# Patient Record
Sex: MALE | Race: WHITE | NOT HISPANIC OR LATINO | Employment: OTHER | URBAN - METROPOLITAN AREA
[De-identification: names, ages, dates, MRNs, and addresses within clinical notes are randomized per-mention and may not be internally consistent; named-entity substitution may affect disease eponyms.]

---

## 2021-01-06 ENCOUNTER — TRANSFERRED RECORDS (OUTPATIENT)
Dept: MULTI SPECIALTY CLINIC | Facility: CLINIC | Age: 71
End: 2021-01-06

## 2021-10-04 ENCOUNTER — HOSPITAL ENCOUNTER (EMERGENCY)
Facility: HOSPITAL | Age: 71
Discharge: HOME | End: 2021-10-04
Payer: COMMERCIAL

## 2021-10-04 VITALS
DIASTOLIC BLOOD PRESSURE: 68 MMHG | WEIGHT: 213 LBS | RESPIRATION RATE: 18 BRPM | HEART RATE: 73 BPM | SYSTOLIC BLOOD PRESSURE: 113 MMHG | OXYGEN SATURATION: 97 % | TEMPERATURE: 98.4 F

## 2021-10-04 NOTE — ED TRIAGE NOTES
Patient presents here for evaluation of readings of pulse oximetry that were 88% from home. He was diagnosed with Covid 19 two days ago. He was vaccinated. But had developed a cough and fevers on 9/30. He denies SOB at rest, but activity does cause shortness of breath.

## 2023-04-24 ENCOUNTER — TELEPHONE (OUTPATIENT)
Dept: INTERNAL MEDICINE | Facility: CLINIC | Age: 73
End: 2023-04-24
Payer: COMMERCIAL

## 2023-04-24 NOTE — TELEPHONE ENCOUNTER
Reason for Call:  Other appointment    Detailed comments: David is hoping to schedule an appt with Dr. Pascual to establish care and also have her look at lumps on his scalp and behind the jaw has has appeared for the last 10 weeks. He had a tumor removed last fall and was diagnosed with very early stage carcinoma melanoma. Dr. Pascual doesn't have any availability until June and was hoping to be seen at a sooner time if possible.    Phone Number Patient can be reached at: Cell number on file:    Telephone Information:   Mobile [phone]       Best Time: Anytime    Can we leave a detailed message on this number? YES    Call taken on 4/24/2023 at 11:00 AM by Emma Burnette

## 2023-04-25 NOTE — TELEPHONE ENCOUNTER
Fine for next available. He should see his dermatologist for skin cancer concerns as well (on chart review BCC was removed, not melanoma).

## 2023-04-28 NOTE — TELEPHONE ENCOUNTER
Patient calling back in regards to VM below. Message from provider relayed. Patient states he was able to secure an appt with his Dermatologist for him immediate concerns on 5/9/23.  He is comfortable with that appt as well as waiting until June to see Dr Pascual.  Scheduled with writer for 6/23/23 which was very satisfactory to patient.  Appt was scheduled and call was ended.

## 2023-06-23 ENCOUNTER — OFFICE VISIT (OUTPATIENT)
Dept: INTERNAL MEDICINE | Facility: CLINIC | Age: 73
End: 2023-06-23
Payer: COMMERCIAL

## 2023-06-23 VITALS
HEART RATE: 73 BPM | HEIGHT: 71 IN | WEIGHT: 223.2 LBS | DIASTOLIC BLOOD PRESSURE: 74 MMHG | SYSTOLIC BLOOD PRESSURE: 132 MMHG | RESPIRATION RATE: 18 BRPM | TEMPERATURE: 97.8 F | OXYGEN SATURATION: 95 % | BODY MASS INDEX: 31.25 KG/M2

## 2023-06-23 DIAGNOSIS — I10 ESSENTIAL HYPERTENSION: ICD-10-CM

## 2023-06-23 DIAGNOSIS — R53.82 CHRONIC FATIGUE: Primary | ICD-10-CM

## 2023-06-23 DIAGNOSIS — R73.01 IMPAIRED FASTING BLOOD SUGAR: ICD-10-CM

## 2023-06-23 PROBLEM — D12.6 ADENOMATOUS POLYP OF COLON: Status: ACTIVE | Noted: 2021-01-13

## 2023-06-23 LAB
BASOPHILS # BLD AUTO: 0 10E3/UL (ref 0–0.2)
BASOPHILS NFR BLD AUTO: 0 %
EOSINOPHIL # BLD AUTO: 0.1 10E3/UL (ref 0–0.7)
EOSINOPHIL NFR BLD AUTO: 2 %
ERYTHROCYTE [DISTWIDTH] IN BLOOD BY AUTOMATED COUNT: 12 % (ref 10–15)
HBA1C MFR BLD: 5.3 % (ref 0–5.6)
HCT VFR BLD AUTO: 41.5 % (ref 40–53)
HGB BLD-MCNC: 14.6 G/DL (ref 13.3–17.7)
IMM GRANULOCYTES # BLD: 0 10E3/UL
IMM GRANULOCYTES NFR BLD: 0 %
LYMPHOCYTES # BLD AUTO: 1.5 10E3/UL (ref 0.8–5.3)
LYMPHOCYTES NFR BLD AUTO: 22 %
MCH RBC QN AUTO: 32.4 PG (ref 26.5–33)
MCHC RBC AUTO-ENTMCNC: 35.2 G/DL (ref 31.5–36.5)
MCV RBC AUTO: 92 FL (ref 78–100)
MONOCYTES # BLD AUTO: 0.6 10E3/UL (ref 0–1.3)
MONOCYTES NFR BLD AUTO: 8 %
NEUTROPHILS # BLD AUTO: 4.5 10E3/UL (ref 1.6–8.3)
NEUTROPHILS NFR BLD AUTO: 68 %
PLATELET # BLD AUTO: 196 10E3/UL (ref 150–450)
RBC # BLD AUTO: 4.5 10E6/UL (ref 4.4–5.9)
WBC # BLD AUTO: 6.7 10E3/UL (ref 4–11)

## 2023-06-23 PROCEDURE — 83036 HEMOGLOBIN GLYCOSYLATED A1C: CPT | Performed by: INTERNAL MEDICINE

## 2023-06-23 PROCEDURE — 80048 BASIC METABOLIC PNL TOTAL CA: CPT | Performed by: INTERNAL MEDICINE

## 2023-06-23 PROCEDURE — 82550 ASSAY OF CK (CPK): CPT | Performed by: INTERNAL MEDICINE

## 2023-06-23 PROCEDURE — 99203 OFFICE O/P NEW LOW 30 MIN: CPT | Performed by: INTERNAL MEDICINE

## 2023-06-23 PROCEDURE — 84443 ASSAY THYROID STIM HORMONE: CPT | Performed by: INTERNAL MEDICINE

## 2023-06-23 PROCEDURE — 36415 COLL VENOUS BLD VENIPUNCTURE: CPT | Performed by: INTERNAL MEDICINE

## 2023-06-23 PROCEDURE — 85025 COMPLETE CBC W/AUTO DIFF WBC: CPT | Performed by: INTERNAL MEDICINE

## 2023-06-23 RX ORDER — KETOCONAZOLE 20 MG/G
2 CREAM TOPICAL
COMMUNITY
Start: 2023-03-27 | End: 2024-01-25

## 2023-06-23 RX ORDER — KETOCONAZOLE 20 MG/G
CREAM TOPICAL
COMMUNITY
Start: 2022-08-31

## 2023-06-23 RX ORDER — ATENOLOL 25 MG/1
25 TABLET ORAL
COMMUNITY
Start: 2023-03-02 | End: 2023-10-23

## 2023-06-23 RX ORDER — CHLORAL HYDRATE 500 MG
2000 CAPSULE ORAL
COMMUNITY

## 2023-06-23 RX ORDER — KETOCONAZOLE 20 MG/ML
2 SHAMPOO TOPICAL
COMMUNITY
Start: 2022-08-31

## 2023-06-23 RX ORDER — ISOSORBIDE MONONITRATE 60 MG/1
60 TABLET, EXTENDED RELEASE ORAL
COMMUNITY
Start: 2022-12-29 | End: 2023-10-23

## 2023-06-23 RX ORDER — PIMECROLIMUS 10 MG/G
1 CREAM TOPICAL
COMMUNITY
Start: 2023-05-05

## 2023-06-23 RX ORDER — ISOSORBIDE MONONITRATE 30 MG/1
30 TABLET, EXTENDED RELEASE ORAL
COMMUNITY
Start: 2023-03-02 | End: 2023-10-23

## 2023-06-23 RX ORDER — LISINOPRIL AND HYDROCHLOROTHIAZIDE 20; 25 MG/1; MG/1
1 TABLET ORAL DAILY
COMMUNITY
Start: 2022-12-29 | End: 2023-10-23

## 2023-06-23 RX ORDER — ISOSORBIDE MONONITRATE 30 MG/1
30 TABLET, EXTENDED RELEASE ORAL
COMMUNITY
Start: 2022-12-29 | End: 2023-06-23

## 2023-06-23 ASSESSMENT — PAIN SCALES - GENERAL: PAINLEVEL: MODERATE PAIN (4)

## 2023-06-23 NOTE — PROGRESS NOTES
Assessment & Plan   Problem List Items Addressed This Visit        Endocrine    Impaired fasting blood sugar     Blood sugar fasting was 103 at his visit with previous PCP 12/2022.  Has noticed that some afternoon fatigue improves after he eats a snack.  Wondering about diabetes.  -Check A1c today         Relevant Orders    Hemoglobin A1c (Completed)       Circulatory    Essential hypertension     Well-controlled on current regimen.  - Continue Lisinopril/HCTZ 20/25 daily, atenolol 25, Imdur 90 mg daily         Relevant Medications    lisinopril-hydrochlorothiazide (ZESTORETIC) 20-25 MG tablet       Other    Chronic fatigue - Primary     Patient reports issues with overall fatigue late morning and late afternoon.  Additionally, has felt fatigue/weakness specifically in his legs over the last 7 to 8 months.  He is still able to walk 2 to 3 miles a day but notices that it is harder to get up after sitting down for a prolonged period of time and stairs are a little bit harder.  Interestingly, he had been working with a  recently but they had not been working on strengthening his legs are on balance as it was hard for him.  -We will check basic labs for fatigue  -Recommended that he work on leg strengthening exercises to help with this weakness he feels in his legs  -He does have low back pain and weak core may also be contributing.  -Offered PT, patient declined saying he like to work on strengthening and stretching on his own first  -Follow-up in 3 months           Relevant Orders    TSH with free T4 reflex    CBC with platelets and differential (Completed)    CK total    Basic metabolic panel        Review of prior external note(s) from - [facility]where information from [facility]  reviewed  Ordering of each unique test  I spent a total of 38 minutes on the day of the visit.   Time spent by me doing chart review, history and exam, documentation and further activities per the note     BMI:   Estimated  "body mass index is 31.13 kg/m  as calculated from the following:    Height as of this encounter: 1.803 m (5' 11\").    Weight as of this encounter: 101.2 kg (223 lb 3.2 oz).   Weight management plan: Discussed healthy diet and exercise guidelines    FUTURE APPOINTMENTS:       - Follow-up in 3 months     Yusra Pascual MD  [facility] JOHNNY Hernandez is a 73 year old, presenting for the following health issues:  Establish Care (Patient stated that he wants to establish care with Dr. Pascual. His PCP isn't network with his new insurance. )        6/23/2023     2:50 PM   Additional Questions   Roomed by Peace SHAFFER MA   Accompanied by ZANDRA     History of Present Illness       Reason for visit:  1 Dermatitis changes on scalp & face  2. Poor stability when fatigued (daily)  3. Deep fatigue late afternoons.  Adressed with eating. Wonder if this might be early onset diabetes.  Symptom onset:  More than a month  Symptoms include:  See above  Symptom intensity:  Moderate  Symptom progression:  Worsening  Had these symptoms before:  No  What makes it worse:  Hard to answer this question constructively.  Tge various symptoms have been on going.  What makes it better:  Usually improve over night    He eats 2-3 servings of fruits and vegetables daily.He consumes 0 sweetened beverage(s) daily.He exercises with enough effort to increase his heart rate 30 to 60 minutes per day.  He exercises with enough effort to increase his heart rate 7 days per week.   He is taking medications regularly.       Fatigue/weakness in legs: Has noticed this over the last 7 to 8 months and has been progressive.  Says that it is very hard to get up after sitting down for any prolonged period of town.  Feels the most fatigue inguinal and quad muscles. Stairs are harder.  He is able to walk 2 to 3 miles a day.  Had been working with a , but says they stayed away from exercises to strengthen or use " "his legs as he was having trouble with them.  Did PT in the remote past and did find it helpful, has not been working on those exercises or stretches recently.    Fatigue: More fatigued late morning and late afternoon. Improves with eating (crackers, pretzels). Father was diabetic.     H/o non-melanoma skin cancers: Follows with Dr. Aden at .  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  He was seen for follow-up on 5/5 for new scalp and right neck lesion.  These were biopsied on 5/5.  Scalp lesion showed SCC in situ, neck lesion was hypertrophic actinic keratoses. Will get SCC removed in July.     Seborrheic dermatitis: Pimecrolimus ointment per dermatology    HTN: Lisinopril/HCTZ 20/25 daily, atenolol 25, Imdur 90 mg daily. Home BPs 130s/70s.    Lipids 12/2022 Tchol 184, HDL 48, , TG 86    Colonoscopy 1/6/21     Review of Systems   Constitutional, HEENT, cardiovascular, pulmonary, gi and gu systems are negative, except as otherwise noted.      Objective    /74 (BP Location: Right arm, Patient Position: Sitting, Cuff Size: Adult Regular)   Pulse 73   Temp 97.8  F (36.6  C) (Oral)   Resp 18   Ht 1.803 m (5' 11\")   Wt 101.2 kg (223 lb 3.2 oz)   SpO2 95%   BMI 31.13 kg/m    Body mass index is 31.13 kg/m .  Physical Exam   GENERAL: healthy, alert and no distress  EYES: Eyes grossly normal to inspection, PERRL and conjunctivae and sclerae normal  HENT: ear canals and TM's normal - flaking skin and cerumen noted, nose and mouth without ulcers or lesions  RESP: lungs clear to auscultation - no rales, rhonchi or wheezes  CV: regular rate and rhythm, normal S1 S2, no S3 or S4, no murmur, click or rub, no peripheral edema and peripheral pulses strong  ABDOMEN: soft, nontender, no hepatosplenomegaly, no masses and bowel sounds normal  MS: no gross musculoskeletal defects noted, no edema  NEURO: Normal strength and tone, mentation intact and speech " normal  PSYCH: mentation appears normal, affect normal/bright    Results for orders placed or performed in visit on 06/23/23 (from the past 24 hour(s))   Hemoglobin A1c   Result Value Ref Range    Hemoglobin A1C 5.3 0.0 - 5.6 %   CBC with platelets and differential    Narrative    The following orders were created for panel order CBC with platelets and differential.  Procedure                               Abnormality         Status                     ---------                               -----------         ------                     CBC with platelets and d...[[id number]]                      Final result                 Please view results for these tests on the individual orders.   CBC with platelets and differential   Result Value Ref Range    WBC Count 6.7 4.0 - 11.0 10e3/uL    RBC Count 4.50 4.40 - 5.90 10e6/uL    Hemoglobin 14.6 13.3 - 17.7 g/dL    Hematocrit 41.5 40.0 - 53.0 %    MCV 92 78 - 100 fL    MCH 32.4 26.5 - 33.0 pg    MCHC 35.2 31.5 - 36.5 g/dL    RDW 12.0 10.0 - 15.0 %    Platelet Count 196 150 - 450 10e3/uL    % Neutrophils 68 %    % Lymphocytes 22 %    % Monocytes 8 %    % Eosinophils 2 %    % Basophils 0 %    % Immature Granulocytes 0 %    Absolute Neutrophils 4.5 1.6 - 8.3 10e3/uL    Absolute Lymphocytes 1.5 0.8 - 5.3 10e3/uL    Absolute Monocytes 0.6 0.0 - 1.3 10e3/uL    Absolute Eosinophils 0.1 0.0 - 0.7 10e3/uL    Absolute Basophils 0.0 0.0 - 0.2 10e3/uL    Absolute Immature Granulocytes 0.0 <=0.4 10e3/uL

## 2023-06-23 NOTE — LETTER
June 27, 2023      Daivd ESCOBAR Rigo  [address]  [place] [facility] [address]        Dear ,    We are writing to inform you of your test results.    Kidney function, muscle enzymes, and electrolytes are normal. Blood sugar is slightly elevated but A1c (true marker for diabetes) was normal, so no concern for diabetes at this time. Thyroid function and blood counts were also normal.     Resulted Orders   Hemoglobin A1c   Result Value Ref Range    Hemoglobin A1C 5.3 0.0 - 5.6 %      Comment:      Normal <5.7%   Prediabetes 5.7-6.4%    Diabetes 6.5% or higher     Note: Adopted from ADA consensus guidelines.   TSH with free T4 reflex   Result Value Ref Range    TSH 1.49 0.30 - 4.20 uIU/mL   CK total   Result Value Ref Range     39 - 308 U/L   Basic metabolic panel   Result Value Ref Range    Sodium 139 136 - 145 mmol/L    Potassium 4.1 3.4 - 5.3 mmol/L    Chloride 100 98 - 107 mmol/L    Carbon Dioxide (CO2) 25 22 - 29 mmol/L    Anion Gap 14 7 - 15 mmol/L    Urea Nitrogen 23.6 (H) 8.0 - 23.0 mg/dL    Creatinine 0.96 0.67 - 1.17 mg/dL    Calcium 9.3 8.8 - 10.2 mg/dL    Glucose 107 (H) 70 - 99 mg/dL    GFR Estimate 85 >60 mL/min/1.73m2   CBC with platelets and differential   Result Value Ref Range    WBC Count 6.7 4.0 - 11.0 10e3/uL    RBC Count 4.50 4.40 - 5.90 10e6/uL    Hemoglobin 14.6 13.3 - 17.7 g/dL    Hematocrit 41.5 40.0 - 53.0 %    MCV 92 78 - 100 fL    MCH 32.4 26.5 - 33.0 pg    MCHC 35.2 31.5 - 36.5 g/dL    RDW 12.0 10.0 - 15.0 %    Platelet Count 196 150 - 450 10e3/uL    % Neutrophils 68 %    % Lymphocytes 22 %    % Monocytes 8 %    % Eosinophils 2 %    % Basophils 0 %    % Immature Granulocytes 0 %    Absolute Neutrophils 4.5 1.6 - 8.3 10e3/uL    Absolute Lymphocytes 1.5 0.8 - 5.3 10e3/uL    Absolute Monocytes 0.6 0.0 - 1.3 10e3/uL    Absolute Eosinophils 0.1 0.0 - 0.7 10e3/uL    Absolute Basophils 0.0 0.0 - 0.2 10e3/uL    Absolute Immature Granulocytes 0.0 <=0.4 10e3/uL       If you have any  questions or concerns, please call the clinic at the number listed above.       Sincerely,      Yusra Pascual MD

## 2023-06-23 NOTE — ASSESSMENT & PLAN NOTE
Patient reports issues with overall fatigue late morning and late afternoon.  Additionally, has felt fatigue/weakness specifically in his legs over the last 7 to 8 months.  He is still able to walk 2 to 3 miles a day but notices that it is harder to get up after sitting down for a prolonged period of time and stairs are a little bit harder.  Interestingly, he had been working with a  recently but they had not been working on strengthening his legs are on balance as it was hard for him.  -We will check basic labs for fatigue  -Recommended that he work on leg strengthening exercises to help with this weakness he feels in his legs  -He does have low back pain and weak core may also be contributing.  -Offered PT, patient declined saying he like to work on strengthening and stretching on his own first  -Follow-up in 3 months

## 2023-06-23 NOTE — ASSESSMENT & PLAN NOTE
Well-controlled on current regimen.  - Continue Lisinopril/HCTZ 20/25 daily, atenolol 25, Imdur 90 mg daily

## 2023-06-23 NOTE — ASSESSMENT & PLAN NOTE
Blood sugar fasting was 103 at his visit with previous PCP 12/2022.  Has noticed that some afternoon fatigue improves after he eats a snack.  Wondering about diabetes.  -Check A1c today

## 2023-06-24 LAB
ANION GAP SERPL CALCULATED.3IONS-SCNC: 14 MMOL/L (ref 7–15)
BUN SERPL-MCNC: 23.6 MG/DL (ref 8–23)
CALCIUM SERPL-MCNC: 9.3 MG/DL (ref 8.8–10.2)
CHLORIDE SERPL-SCNC: 100 MMOL/L (ref 98–107)
CK SERPL-CCNC: 100 U/L (ref 39–308)
CREAT SERPL-MCNC: 0.96 MG/DL (ref 0.67–1.17)
DEPRECATED HCO3 PLAS-SCNC: 25 MMOL/L (ref 22–29)
GFR SERPL CREATININE-BSD FRML MDRD: 85 ML/MIN/1.73M2
GLUCOSE SERPL-MCNC: 107 MG/DL (ref 70–99)
POTASSIUM SERPL-SCNC: 4.1 MMOL/L (ref 3.4–5.3)
SODIUM SERPL-SCNC: 139 MMOL/L (ref 136–145)
TSH SERPL DL<=0.005 MIU/L-ACNC: 1.49 UIU/ML (ref 0.3–4.2)

## 2023-06-27 ENCOUNTER — TELEPHONE (OUTPATIENT)
Dept: INTERNAL MEDICINE | Facility: CLINIC | Age: 73
End: 2023-06-27
Payer: COMMERCIAL

## 2023-06-27 NOTE — TELEPHONE ENCOUNTER
----- Message from Yusra Pascual MD sent at 6/26/2023  8:28 PM CDT -----  Please call patient with results:     Kidney function, muscle enzymes, and electrolytes are normal. Blood sugar is slightly elevated but A1c (true marker for diabetes) was normal, so no concern for diabetes at this time. Thyroid function and blood counts were also normal.

## 2023-10-23 ENCOUNTER — OFFICE VISIT (OUTPATIENT)
Dept: INTERNAL MEDICINE | Facility: CLINIC | Age: 73
End: 2023-10-23
Payer: COMMERCIAL

## 2023-10-23 VITALS
HEIGHT: 70 IN | TEMPERATURE: 97.7 F | HEART RATE: 72 BPM | WEIGHT: 230.9 LBS | SYSTOLIC BLOOD PRESSURE: 138 MMHG | DIASTOLIC BLOOD PRESSURE: 80 MMHG | RESPIRATION RATE: 17 BRPM | BODY MASS INDEX: 33.05 KG/M2 | OXYGEN SATURATION: 95 %

## 2023-10-23 DIAGNOSIS — Z12.5 SCREENING FOR PROSTATE CANCER: ICD-10-CM

## 2023-10-23 DIAGNOSIS — H91.93 DECREASED HEARING OF BOTH EARS: ICD-10-CM

## 2023-10-23 DIAGNOSIS — L21.9 SEBORRHEIC DERMATITIS: ICD-10-CM

## 2023-10-23 DIAGNOSIS — I10 ESSENTIAL HYPERTENSION: ICD-10-CM

## 2023-10-23 DIAGNOSIS — E66.811 CLASS 1 OBESITY DUE TO EXCESS CALORIES WITH SERIOUS COMORBIDITY AND BODY MASS INDEX (BMI) OF 33.0 TO 33.9 IN ADULT: ICD-10-CM

## 2023-10-23 DIAGNOSIS — Z00.00 ENCOUNTER FOR MEDICARE ANNUAL WELLNESS EXAM: Primary | ICD-10-CM

## 2023-10-23 DIAGNOSIS — R39.9 LOWER URINARY TRACT SYMPTOMS (LUTS): ICD-10-CM

## 2023-10-23 DIAGNOSIS — E66.09 CLASS 1 OBESITY DUE TO EXCESS CALORIES WITH SERIOUS COMORBIDITY AND BODY MASS INDEX (BMI) OF 33.0 TO 33.9 IN ADULT: ICD-10-CM

## 2023-10-23 DIAGNOSIS — Z23 ENCOUNTER FOR VACCINATION: ICD-10-CM

## 2023-10-23 LAB
ALBUMIN SERPL BCG-MCNC: 4.4 G/DL (ref 3.5–5.2)
ALP SERPL-CCNC: 69 U/L (ref 40–129)
ALT SERPL W P-5'-P-CCNC: 10 U/L (ref 0–70)
ANION GAP SERPL CALCULATED.3IONS-SCNC: 9 MMOL/L (ref 7–15)
AST SERPL W P-5'-P-CCNC: 22 U/L (ref 0–45)
BILIRUB SERPL-MCNC: 0.5 MG/DL
BUN SERPL-MCNC: 17.7 MG/DL (ref 8–23)
CALCIUM SERPL-MCNC: 9.1 MG/DL (ref 8.8–10.2)
CHLORIDE SERPL-SCNC: 100 MMOL/L (ref 98–107)
CHOLEST SERPL-MCNC: 176 MG/DL
CREAT SERPL-MCNC: 0.83 MG/DL (ref 0.67–1.17)
DEPRECATED HCO3 PLAS-SCNC: 27 MMOL/L (ref 22–29)
EGFRCR SERPLBLD CKD-EPI 2021: >90 ML/MIN/1.73M2
ERYTHROCYTE [DISTWIDTH] IN BLOOD BY AUTOMATED COUNT: 11.7 % (ref 10–15)
GLUCOSE SERPL-MCNC: 87 MG/DL (ref 70–99)
HCT VFR BLD AUTO: 40.9 % (ref 40–53)
HDLC SERPL-MCNC: 50 MG/DL
HGB BLD-MCNC: 14.5 G/DL (ref 13.3–17.7)
LDLC SERPL CALC-MCNC: 108 MG/DL
MCH RBC QN AUTO: 32.7 PG (ref 26.5–33)
MCHC RBC AUTO-ENTMCNC: 35.5 G/DL (ref 31.5–36.5)
MCV RBC AUTO: 92 FL (ref 78–100)
NONHDLC SERPL-MCNC: 126 MG/DL
PLATELET # BLD AUTO: 186 10E3/UL (ref 150–450)
POTASSIUM SERPL-SCNC: 3.9 MMOL/L (ref 3.4–5.3)
PROT SERPL-MCNC: 6.6 G/DL (ref 6.4–8.3)
PSA SERPL DL<=0.01 NG/ML-MCNC: 0.42 NG/ML (ref 0–6.5)
RBC # BLD AUTO: 4.43 10E6/UL (ref 4.4–5.9)
SODIUM SERPL-SCNC: 136 MMOL/L (ref 135–145)
TRIGL SERPL-MCNC: 90 MG/DL
WBC # BLD AUTO: 5.8 10E3/UL (ref 4–11)

## 2023-10-23 PROCEDURE — 91320 SARSCV2 VAC 30MCG TRS-SUC IM: CPT | Performed by: INTERNAL MEDICINE

## 2023-10-23 PROCEDURE — G0438 PPPS, INITIAL VISIT: HCPCS | Performed by: INTERNAL MEDICINE

## 2023-10-23 PROCEDURE — G0008 ADMIN INFLUENZA VIRUS VAC: HCPCS | Performed by: INTERNAL MEDICINE

## 2023-10-23 PROCEDURE — 80053 COMPREHEN METABOLIC PANEL: CPT | Performed by: INTERNAL MEDICINE

## 2023-10-23 PROCEDURE — 90662 IIV NO PRSV INCREASED AG IM: CPT | Performed by: INTERNAL MEDICINE

## 2023-10-23 PROCEDURE — 99214 OFFICE O/P EST MOD 30 MIN: CPT | Mod: 25 | Performed by: INTERNAL MEDICINE

## 2023-10-23 PROCEDURE — 36415 COLL VENOUS BLD VENIPUNCTURE: CPT | Performed by: INTERNAL MEDICINE

## 2023-10-23 PROCEDURE — 85027 COMPLETE CBC AUTOMATED: CPT | Performed by: INTERNAL MEDICINE

## 2023-10-23 PROCEDURE — 80061 LIPID PANEL: CPT | Performed by: INTERNAL MEDICINE

## 2023-10-23 PROCEDURE — G0103 PSA SCREENING: HCPCS | Performed by: INTERNAL MEDICINE

## 2023-10-23 PROCEDURE — 90480 ADMN SARSCOV2 VAC 1/ONLY CMP: CPT | Performed by: INTERNAL MEDICINE

## 2023-10-23 RX ORDER — KETOCONAZOLE 20 MG/G
CREAM TOPICAL
Status: CANCELLED | OUTPATIENT
Start: 2023-10-23

## 2023-10-23 RX ORDER — ISOSORBIDE MONONITRATE 60 MG/1
60 TABLET, EXTENDED RELEASE ORAL DAILY
Qty: 90 TABLET | Refills: 3 | Status: SHIPPED | OUTPATIENT
Start: 2023-10-23

## 2023-10-23 RX ORDER — ATENOLOL 25 MG/1
25 TABLET ORAL DAILY
Qty: 90 TABLET | Refills: 3 | Status: SHIPPED | OUTPATIENT
Start: 2023-10-23 | End: 2024-01-30

## 2023-10-23 RX ORDER — LISINOPRIL AND HYDROCHLOROTHIAZIDE 20; 25 MG/1; MG/1
1 TABLET ORAL DAILY
Qty: 90 TABLET | Refills: 3 | Status: SHIPPED | OUTPATIENT
Start: 2023-10-23 | End: 2023-11-20

## 2023-10-23 RX ORDER — TAMSULOSIN HYDROCHLORIDE 0.4 MG/1
0.4 CAPSULE ORAL DAILY
Qty: 90 CAPSULE | Refills: 0 | Status: SHIPPED | OUTPATIENT
Start: 2023-10-23 | End: 2024-01-30

## 2023-10-23 RX ORDER — ISOSORBIDE MONONITRATE 30 MG/1
30 TABLET, EXTENDED RELEASE ORAL DAILY
Qty: 90 TABLET | Refills: 3 | Status: SHIPPED | OUTPATIENT
Start: 2023-10-23

## 2023-10-23 RX ORDER — PIMECROLIMUS 10 MG/G
1 CREAM TOPICAL
Status: CANCELLED | OUTPATIENT
Start: 2023-10-23

## 2023-10-23 ASSESSMENT — PAIN SCALES - GENERAL: PAINLEVEL: NO PAIN (0)

## 2023-10-23 ASSESSMENT — ENCOUNTER SYMPTOMS
HEMATURIA: 0
DYSURIA: 0
NERVOUS/ANXIOUS: 0
FEVER: 0
DIARRHEA: 0
CHILLS: 0
NAUSEA: 0
FREQUENCY: 1
DIZZINESS: 0
PARESTHESIAS: 0
COUGH: 0
HEARTBURN: 0
HEADACHES: 0
EYE PAIN: 0
WEAKNESS: 0
PALPITATIONS: 0
SHORTNESS OF BREATH: 0
SORE THROAT: 0
ABDOMINAL PAIN: 0
JOINT SWELLING: 0
MYALGIAS: 1
HEMATOCHEZIA: 0
CONSTIPATION: 0

## 2023-10-23 ASSESSMENT — ACTIVITIES OF DAILY LIVING (ADL): CURRENT_FUNCTION: NO ASSISTANCE NEEDED

## 2023-10-23 NOTE — PROGRESS NOTES
"SUBJECTIVE:   David is a 73 year old who presents for Preventive Visit.      10/23/2023     2:20 PM   Additional Questions   Roomed by Porter NAM CMA     Are you in the first 12 months of your Medicare coverage?  No    Healthy Habits:     In general, how would you rate your overall health?  Excellent    Frequency of exercise:  6-7 days/week    Duration of exercise:  45-60 minutes    Do you usually eat at least 4 servings of fruit and vegetables a day, include whole grains    & fiber and avoid regularly eating high fat or \"junk\" foods?  Yes    Taking medications regularly:  Yes    Medication side effects:  None    Ability to successfully perform activities of daily living:  No assistance needed    Home Safety:  Lack of grab bars in the bathroom    Hearing Impairment:  No hearing concerns    In the past 6 months, have you been bothered by leaking of urine? Yes    In general, how would you rate your overall mental or emotional health?  Excellent    Additional concerns today:  No    Today's PHQ-2 Score:       10/23/2023     2:12 PM   PHQ-2 ( 1999 [employer])   Q1: Little interest or pleasure in doing things 0   Q2: Feeling down, depressed or hopeless 0   PHQ-2 Score 0   Q1: Little interest or pleasure in doing things Not at all   Q2: Feeling down, depressed or hopeless Not at all   PHQ-2 Score 0     HTN: Lisinopril/HCTZ 20/25 daily, atenolol 25, Imdur 90 mg daily. BP today 138/80.     Lipids 12/2022 Tchol 184, HDL 48, , TG 86     Seborrheic Dermatitis: Following with dermDr. Aden at , last visit 9/8/23. Pimecrolimus 1% cream and ketonconazole cream.     Obesity: Stopped weight management program spring 2023 and 15 lbs since then, 25 lbs total over the year. Wanting help with more accountability because he notes this is what has changed and he has a hard time staying consistent with diet without that. Still very active and walking 3.5-4 miles a day.     Urinary incontinence: earlier this summer, getting up 2-3 " times at night. Does feel like he empties bladder fully but will notice some leakage/wetness in underwear if he doesn't use bathroom in time. Stream is normal. No dribbling.     Have you ever done Advance Care Planning? (For example, a Health Directive, POLST, or a discussion with a medical provider or your loved ones about your wishes): Yes, patient states has an Advance Care Planning document and will bring a copy to the clinic.     Fall risk  Fallen 2 or more times in the past year?: No  Any fall with injury in the past year?: No    Cognitive Screening   1) Repeat 3 items (Leader, Season, Table)    2) Clock draw: NORMAL  3) 3 item recall: Recalls 3 objects  Results: 3 items recalled: COGNITIVE IMPAIRMENT LESS LIKELY    Mini-CogTM Copyright S Keven. Licensed by the author for use in [facility]; reprinted with permission (ulices@[facility]). All rights reserved.        Reviewed and updated as needed this visit by clinical staff   Tobacco  Allergies  Meds  Problems  Med Hx  Surg Hx  Fam Hx          Reviewed and updated as needed this visit by Provider   Tobacco  Allergies  Meds  Problems  Med Hx  Surg Hx  Fam Hx         Social History     Tobacco Use     Smoking status: Never     Smokeless tobacco: Never   Substance Use Topics     Alcohol use: Not on file         10/23/2023     2:12 PM   Alcohol Use   Prescreen: >3 drinks/day or >7 drinks/week? No     Do you have a current opioid prescription? No  Do you use any other controlled substances or medications that are not prescribed by a provider? None    Current providers sharing in care for this patient include:   Patient Care Team:  Yusra Pascual MD as PCP - General (Internal Medicine)  Yusra Pascual MD as Assigned PCP    The following health maintenance items are reviewed in Epic and correct as of today:  Health Maintenance   Topic Date Due     ANNUAL REVIEW OF HM ORDERS  Never done     LIPID  Never done     RSV  "VACCINE 60+ (1 - 1-dose 60+ series) Never done     MEDICARE ANNUAL WELLNESS VISIT  03/01/2017     FALL RISK ASSESSMENT  10/23/2024     COLORECTAL CANCER SCREENING  01/06/2026     ADVANCE CARE PLANNING  10/23/2028     DTAP/TDAP/TD IMMUNIZATION (5 - Td or Tdap) 06/17/2031     PHQ-2 (once per calendar year)  Completed     INFLUENZA VACCINE  Completed     Pneumococcal Vaccine: 65+ Years  Completed     ZOSTER IMMUNIZATION  Completed     COVID-19 Vaccine  Completed     HEPATITIS C SCREENING  Addressed     IPV IMMUNIZATION  Aged Out     HPV IMMUNIZATION  Aged Out     MENINGITIS IMMUNIZATION  Aged Out     AORTIC ANEURYSM SCREENING (SYSTEM ASSIGNED)  Discontinued       Review of Systems   Constitutional:  Negative for chills and fever.   HENT:  Negative for congestion, ear pain, hearing loss and sore throat.    Eyes:  Negative for pain and visual disturbance.   Respiratory:  Negative for cough and shortness of breath.    Cardiovascular:  Negative for chest pain, palpitations and peripheral edema.   Gastrointestinal:  Negative for abdominal pain, constipation, diarrhea, heartburn, hematochezia and nausea.   Genitourinary:  Positive for frequency. Negative for dysuria, genital sores, hematuria, impotence, penile discharge and urgency.   Musculoskeletal:  Positive for myalgias. Negative for joint swelling.   Skin:  Negative for rash.   Neurological:  Negative for dizziness, weakness, headaches and paresthesias.   Psychiatric/Behavioral:  Negative for mood changes. The patient is not nervous/anxious.      OBJECTIVE:   /80 (BP Location: Right arm, Patient Position: Sitting, Cuff Size: Adult Regular)   Pulse 72   Temp 97.7  F (36.5  C) (Oral)   Resp 17   Ht 1.775 m (5' 9.9\")   Wt 104.7 kg (230 lb 14.4 oz)   SpO2 95%   BMI 33.23 kg/m   Estimated body mass index is 33.23 kg/m  as calculated from the following:    Height as of this encounter: 1.775 m (5' 9.9\").    Weight as of this encounter: 104.7 kg (230 lb 14.4 " oz).  Physical Exam  GENERAL: healthy, alert and no distress  EYES: Eyes grossly normal to inspection, PERRL and conjunctivae and sclerae normal  HENT: ear canals and TM's normal, nose and mouth without ulcers or lesions  NECK: no adenopathy, no asymmetry, masses, or scars and thyroid normal to palpation  RESP: lungs clear to auscultation - no rales, rhonchi or wheezes  CV: regular rate and rhythm, normal S1 S2, no S3 or S4, no murmur, click or rub, no peripheral edema and peripheral pulses strong  ABDOMEN: soft, nontender, no hepatosplenomegaly, no masses and bowel sounds normal  MS: no gross musculoskeletal defects noted, no edema  SKIN: no suspicious lesions or rashes  NEURO: Normal strength and tone, mentation intact and speech normal  PSYCH: mentation appears normal, affect normal/bright    Patient declined rectal exam today.     Diagnostic Test Results:  Labs reviewed in Epic  Results for orders placed or performed in visit on 10/23/23 (from the past 24 hour(s))   CBC with platelets   Result Value Ref Range    WBC Count 5.8 4.0 - 11.0 10e3/uL    RBC Count 4.43 4.40 - 5.90 10e6/uL    Hemoglobin 14.5 13.3 - 17.7 g/dL    Hematocrit 40.9 40.0 - 53.0 %    MCV 92 78 - 100 fL    MCH 32.7 26.5 - 33.0 pg    MCHC 35.5 31.5 - 36.5 g/dL    RDW 11.7 10.0 - 15.0 %    Platelet Count 186 150 - 450 10e3/uL       ASSESSMENT / PLAN:     Problem List Items Addressed This Visit          Nervous and Auditory    Decreased hearing of both ears     Has had progressive slight hearing loss in both ears.   - audiology referral          Relevant Orders    Adult Audiology  Referral       Digestive    Class 1 obesity due to excess calories with serious comorbidity and body mass index (BMI) of 33.0 to 33.9 in adult     Has gained 15 lbs since stopping his weight management program through [facility]. Interested in another similar program because he has trouble with consistency in diet without accountability.   - 24 week healthy lifestyle  program referral placed         Relevant Orders    Adult Comprehensive Weight Management  Referral       Circulatory    Essential hypertension     Well-controlled on current regimen.  - Continue Lisinopril/HCTZ 20/25 daily, atenolol 25, Imdur 90 mg daily         Relevant Medications    lisinopril-hydrochlorothiazide (ZESTORETIC) 20-25 MG tablet    atenolol (TENORMIN) 25 MG tablet    isosorbide mononitrate (IMDUR) 30 MG 24 hr tablet    isosorbide mononitrate (IMDUR) 60 MG 24 hr tablet       Musculoskeletal and Integumentary    Seborrheic dermatitis     Follows with Dr. Aden, dermatology at .   - Well controlled with Pimecrolimus 1% cream and ketonconazole cream.             Urinary    Lower urinary tract symptoms (LUTS)     Patient has had few months of increased nocturia, frequency during the day and some of what sounds like overflow or urgency incontinence. He declines rectal exam today.   - Trial flomax 0.4 mg nightly  - F/up 3 months          Relevant Medications    tamsulosin (FLOMAX) 0.4 MG capsule       Other    Encounter for Medicare annual wellness exam - Primary     We discussed healthy lifestyle, nutrition, cardiovascular risk reduction, self care, safety, sunscreen, and timing of cancer screening.  Health maintenance screening and immunizations reviewed with the patient.    - Update labs today  - COVID and flu shots   - Colonoscopy due 2026           Relevant Orders    Lipid panel reflex to direct LDL Non-fasting    Comprehensive metabolic panel    CBC with platelets (Completed)     Other Visit Diagnoses       Encounter for vaccination        Relevant Orders    INFLUENZA VACCINE 65+ (FLUZONE HD) (Completed)    COVID-19 12+ (2023-24) (PFIZER) (Completed)    Screening for prostate cancer        Relevant Orders    PSA, screen            Patient has been advised of split billing requirements and indicates understanding: Yes      COUNSELING:  Reviewed preventive health counseling, as reflected in  patient instructions  Special attention given to:       Regular exercise       Healthy diet/nutrition       Immunizations  Vaccinated for: Covid-19 and Influenza          He reports that he has never smoked. He has never used smokeless tobacco.      Appropriate preventive services were discussed with this patient, including applicable screening as appropriate for fall prevention, nutrition, physical activity, Tobacco-use cessation, weight loss and cognition.  Checklist reviewing preventive services available has been given to the patient.    Reviewed patients plan of care and provided an AVS. The Basic Care Plan (routine screening as documented in Health Maintenance) for David meets the Care Plan requirement. This Care Plan has been established and reviewed with the Patient.          Yusra Pascual MD  [facility]    Identified Health Risks:  I have reviewed Opioid Use Disorder and Substance Use Disorder risk factors and made any needed referrals. Information on urinary incontinence and treatment options given to patient.

## 2023-10-23 NOTE — LETTER
October 25, 2023      David Sierra  [address]  [place] [facility] [address]        Dear ,    We are writing to inform you of your test results.    Your cholesterol levels are stable from previous, which is good. Your PSA, screen for prostate cancer was negative. Your blood counts, blood sugar, kidney function, liver function and electrolytes were also all normal.     Resulted Orders   Lipid panel reflex to direct LDL Non-fasting   Result Value Ref Range    Cholesterol 176 <200 mg/dL    Triglycerides 90 <150 mg/dL    Direct Measure HDL 50 >=40 mg/dL    LDL Cholesterol Calculated 108 (H) <=100 mg/dL    Non HDL Cholesterol 126 <130 mg/dL    Narrative    Cholesterol  Desirable:  <200 mg/dL    Triglycerides  Normal:  Less than 150 mg/dL  Borderline High:  150-199 mg/dL  High:  200-499 mg/dL  Very High:  Greater than or equal to 500 mg/dL    Direct Measure HDL  Female:  Greater than or equal to 50 mg/dL   Male:  Greater than or equal to 40 mg/dL    LDL Cholesterol  Desirable:  <100mg/dL  Above Desirable:  100-129 mg/dL   Borderline High:  130-159 mg/dL   High:  160-189 mg/dL   Very High:  >= 190 mg/dL    Non HDL Cholesterol  Desirable:  130 mg/dL  Above Desirable:  130-159 mg/dL  Borderline High:  160-189 mg/dL  High:  190-219 mg/dL  Very High:  Greater than or equal to 220 mg/dL   Comprehensive metabolic panel   Result Value Ref Range    Sodium 136 135 - 145 mmol/L      Comment:      Reference intervals for this test were updated on 09/26/2023 to more accurately reflect our healthy population. There may be differences in the flagging of prior results with similar values performed with this method. Interpretation of those prior results can be made in the context of the updated reference intervals.     Potassium 3.9 3.4 - 5.3 mmol/L    Carbon Dioxide (CO2) 27 22 - 29 mmol/L    Anion Gap 9 7 - 15 mmol/L    Urea Nitrogen 17.7 8.0 - 23.0 mg/dL    Creatinine 0.83 0.67 - 1.17 mg/dL    GFR Estimate >90 >60  mL/min/1.73m2    Calcium 9.1 8.8 - 10.2 mg/dL    Chloride 100 98 - 107 mmol/L    Glucose 87 70 - 99 mg/dL    Alkaline Phosphatase 69 40 - 129 U/L    AST 22 0 - 45 U/L      Comment:      Reference intervals for this test were updated on 6/12/2023 to more accurately reflect our healthy population. There may be differences in the flagging of prior results with similar values performed with this method. Interpretation of those prior results can be made in the context of the updated reference intervals.    ALT 10 0 - 70 U/L      Comment:      Reference intervals for this test were updated on 6/12/2023 to more accurately reflect our healthy population. There may be differences in the flagging of prior results with similar values performed with this method. Interpretation of those prior results can be made in the context of the updated reference intervals.      Protein Total 6.6 6.4 - 8.3 g/dL    Albumin 4.4 3.5 - 5.2 g/dL    Bilirubin Total 0.5 <=1.2 mg/dL   CBC with platelets   Result Value Ref Range    WBC Count 5.8 4.0 - 11.0 10e3/uL    RBC Count 4.43 4.40 - 5.90 10e6/uL    Hemoglobin 14.5 13.3 - 17.7 g/dL    Hematocrit 40.9 40.0 - 53.0 %    MCV 92 78 - 100 fL    MCH 32.7 26.5 - 33.0 pg    MCHC 35.5 31.5 - 36.5 g/dL    RDW 11.7 10.0 - 15.0 %    Platelet Count 186 150 - 450 10e3/uL   PSA, screen   Result Value Ref Range    Prostate Specific Antigen Screen 0.42 0.00 - 6.50 ng/mL    Narrative    This result is obtained using the Roche Elecsys total PSA method on the peggy e801 immunoassay analyzer. Results obtained with different assay methods or kits cannot be used interchangeably.       If you have any questions or concerns, please call the clinic at the number listed above.       Sincerely,      Yusra Pascual MD

## 2023-10-23 NOTE — PATIENT INSTRUCTIONS
Start flomax 0.4 mg nightly.         Patient Education   Personalized Prevention Plan  You are due for the preventive services outlined below.  Your care team is available to assist you in scheduling these services.  If you have already completed any of these items, please share that information with your care team to update in your medical record.  Health Maintenance Due   Topic Date Due    ANNUAL REVIEW OF HM ORDERS  Never done    Cholesterol Lab  Never done    RSV VACCINE 60+ (1 - 1-dose 60+ series) Never done    AORTIC ANEURYSM SCREENING (SYSTEM ASSIGNED)  Never done    Annual Wellness Visit  03/01/2017    Flu Vaccine (1) 09/01/2023    COVID-19 Vaccine (5 - 2023-24 season) 09/01/2023     Bladder Training: Care Instructions  Your Care Instructions     Bladder training is used to treat urge incontinence and stress incontinence. Urge incontinence means that the need to urinate comes on so fast that you can't get to a toilet in time. Stress incontinence means that you leak urine because of pressure on your bladder. For example, it may happen when you laugh, cough, or lift something heavy.  Bladder training can increase how long you can wait before you have to urinate. It can also help your bladder hold more urine. And it can give you better control over the urge to urinate.  It is important to remember that bladder training takes a few weeks to a few months to make a difference. You may not see results right away, but don't give up.  Follow-up care is a key part of your treatment and safety. Be sure to make and go to all appointments, and call your doctor if you are having problems. It's also a good idea to know your test results and keep a list of the medicines you take.  How can you care for yourself at home?  Work with your doctor to come up with a bladder training program that is right for you. You may use one or more of the following methods.  Delayed urination  In the beginning, try to keep from urinating for  "5 minutes after you first feel the need to go.  While you wait, take deep, slow breaths to relax. Kegel exercises can also help you delay the need to go to the bathroom.  After some practice, when you can easily wait 5 minutes to urinate, try to wait 10 minutes before you urinate.  Slowly increase the waiting period until you are able to control when you have to urinate.  Scheduled urination  Empty your bladder when you first wake up in the morning.  Schedule times throughout the day when you will urinate.  Start by going to the bathroom every hour, even if you don't need to go.  Slowly increase the time between trips to the bathroom.  When you have found a schedule that works well for you, keep doing it.  If you wake up during the night and have to urinate, do it. Apply your schedule to waking hours only.  Kegel exercises  These tighten and strengthen pelvic muscles, which can help you control the flow of urine. (If doing these exercises causes pain, stop doing them and talk with your doctor.) To do Kegel exercises:  Squeeze your muscles as if you were trying not to pass gas. Or squeeze your muscles as if you were stopping the flow of urine. Your belly, legs, and buttocks shouldn't move.  Hold the squeeze for 3 seconds, then relax for 5 to 10 seconds.  Start with 3 seconds, then add 1 second each week until you are able to squeeze for 10 seconds.  Repeat the exercise 10 times a session. Do 3 to 8 sessions a day.  When should you call for help?  Watch closely for changes in your health, and be sure to contact your doctor if:    Your incontinence is getting worse.     You do not get better as expected.   Where can you learn more?  Go to https://[email or web address].net/patiented  Enter V684 in the search box to learn more about \"Bladder Training: Care Instructions.\"  Current as of: March 1, 2023               Content Version: 13.7    8215-1469 [employer].   Care instructions adapted under license by your " healthcare professional. If you have questions about a medical condition or this instruction, always ask your healthcare professional. [employer] disclaims any warranty or liability for your use of this information.

## 2023-10-24 NOTE — ASSESSMENT & PLAN NOTE
We discussed healthy lifestyle, nutrition, cardiovascular risk reduction, self care, safety, sunscreen, and timing of cancer screening.  Health maintenance screening and immunizations reviewed with the patient.    - Update labs today  - COVID and flu shots   - Colonoscopy due 2026

## 2023-10-24 NOTE — ASSESSMENT & PLAN NOTE
Follows with Dr. Aden, dermatology at .   - Well controlled with Pimecrolimus 1% cream and ketonconazole cream.

## 2023-10-24 NOTE — ASSESSMENT & PLAN NOTE
Has gained 15 lbs since stopping his weight management program through [facility]. Interested in another similar program because he has trouble with consistency in diet without accountability.   - 24 week healthy lifestyle program referral placed

## 2023-10-24 NOTE — ASSESSMENT & PLAN NOTE
Patient has had few months of increased nocturia, frequency during the day and some of what sounds like overflow or urgency incontinence. He declines rectal exam today.   - Trial flomax 0.4 mg nightly  - F/up 3 months

## 2023-11-17 ENCOUNTER — MYC MEDICAL ADVICE (OUTPATIENT)
Dept: INTERNAL MEDICINE | Facility: CLINIC | Age: 73
End: 2023-11-17
Payer: COMMERCIAL

## 2023-11-17 DIAGNOSIS — I10 ESSENTIAL HYPERTENSION: Primary | ICD-10-CM

## 2023-11-20 RX ORDER — HYDROCHLOROTHIAZIDE 12.5 MG/1
25 TABLET ORAL DAILY
Qty: 180 TABLET | Refills: 0 | Status: SHIPPED | OUTPATIENT
Start: 2023-11-20 | End: 2024-01-30

## 2023-11-20 RX ORDER — LISINOPRIL 40 MG/1
40 TABLET ORAL DAILY
Qty: 90 TABLET | Refills: 0 | Status: SHIPPED | OUTPATIENT
Start: 2023-11-20 | End: 2024-01-30

## 2023-11-26 ENCOUNTER — MYC MEDICAL ADVICE (OUTPATIENT)
Dept: INTERNAL MEDICINE | Facility: CLINIC | Age: 73
End: 2023-11-26
Payer: COMMERCIAL

## 2023-11-29 ENCOUNTER — OFFICE VISIT (OUTPATIENT)
Dept: AUDIOLOGY | Facility: CLINIC | Age: 73
End: 2023-11-29
Attending: INTERNAL MEDICINE
Payer: COMMERCIAL

## 2023-11-29 DIAGNOSIS — H91.93 DECREASED HEARING OF BOTH EARS: ICD-10-CM

## 2023-11-29 DIAGNOSIS — H90.3 SENSORINEURAL HEARING LOSS (SNHL) OF BOTH EARS: Primary | ICD-10-CM

## 2023-11-29 PROCEDURE — 92550 TYMPANOMETRY & REFLEX THRESH: CPT | Performed by: AUDIOLOGIST

## 2023-11-29 PROCEDURE — 92557 COMPREHENSIVE HEARING TEST: CPT | Performed by: AUDIOLOGIST

## 2023-11-29 NOTE — PROGRESS NOTES
AUDIOLOGY REPORT    SUBJECTIVE:  David Sierra is a 73 year old male who was seen in the Audiology Clinic at the [facility] for audiologic evaluation, referred by Yusra Pascual M.D.      Patient reports his wife and family feel he is not hearing well. Patient notices some difficulty hearing when in background noise. He reports chronic aural fullness in both ears, more so in the fall. He denies otalgia, otorrhea, tinnitus, dizziness, past ear surgery, family history of hearing loss, noise exposure, and prior use of amplification.    OBJECTIVE:  Abuse Screening:  Do you feel unsafe at home or work/school? No  Do you feel threatened by someone? No  Does anyone try to keep you from having contact with others, or doing things outside of your home? No  Physical signs of abuse present? No     Fall Risk Screen:  1. Have you fallen two or more times in the past year? No  2. Have you fallen and had an injury in the past year? No    Timed Up and Go Score (in seconds): not tested  Is patient a fall risk? No  Referral initiated: No  Fall Risk Assessment Completed by Audiology    Otoscopic exam indicates dry mild cerumen deep in ear canals bilaterally.     Pure Tone Thresholds assessed using conventional audiometry with good  reliability from 250-8000 Hz bilaterally using insert earphones and circumaural headphones     RIGHT:  normal sloping to mile sensorineural hearing loss (slight air/bone gap at 4000 Hz only)    LEFT:  normal sloping to mild sensorineural hearing loss      Tympanogram:    RIGHT: normal eardrum mobility    LEFT:   normal eardrum mobility    Reflexes (reported by stimulus ear):   RIGHT: Ipsilateral is present at normal levels   RIGHT: Contralateral is present at normal levels   LEFT:   Ipsilateral is present at normal levels   LEFT:   Contralateral is absent at frequencies tested    Speech Reception Threshold:    RIGHT: 15 dB HL    LEFT:   15 dB HL  Word Recognition Score:      RIGHT: 100% at 60 dB HL using NU-6 recorded word list.    LEFT:   92% at 60 dB HL using NU-6 recorded word list.      ASSESSMENT:   Pure tone audiometry showed normal sloping to mild sensorineural hearing loss in both ears.  Today s results were discussed with the patient in detail.     PLAN:   It is recommended that the patient follow up with Audiology in 1 year to monitor hearing. Recommend to discontinue the use of the ear cleaning tool he is using and return to ENT every 6-12 months for ear cleaning. Please call this clinic with questions regarding these results or recommendations.      Maida Powers, CCC-A  [place] Licensed Audiologist #[id number]

## 2023-12-29 ENCOUNTER — PATIENT OUTREACH (OUTPATIENT)
Dept: GASTROENTEROLOGY | Facility: CLINIC | Age: 73
End: 2023-12-29
Payer: COMMERCIAL

## 2024-01-19 ASSESSMENT — SLEEP AND FATIGUE QUESTIONNAIRES
HOW LIKELY ARE YOU TO NOD OFF OR FALL ASLEEP WHILE LYING DOWN TO REST IN THE AFTERNOON WHEN CIRCUMSTANCES PERMIT: HIGH CHANCE OF DOZING
HOW LIKELY ARE YOU TO NOD OFF OR FALL ASLEEP IN A CAR, WHILE STOPPED FOR A FEW MINUTES IN TRAFFIC: WOULD NEVER DOZE
HOW LIKELY ARE YOU TO NOD OFF OR FALL ASLEEP WHILE SITTING QUIETLY AFTER LUNCH WITHOUT ALCOHOL: WOULD NEVER DOZE
HOW LIKELY ARE YOU TO NOD OFF OR FALL ASLEEP WHILE WATCHING TV: SLIGHT CHANCE OF DOZING
HOW LIKELY ARE YOU TO NOD OFF OR FALL ASLEEP WHEN YOU ARE A PASSENGER IN A CAR FOR AN HOUR WITHOUT A BREAK: SLIGHT CHANCE OF DOZING
HOW LIKELY ARE YOU TO NOD OFF OR FALL ASLEEP WHILE SITTING AND READING: SLIGHT CHANCE OF DOZING
HOW LIKELY ARE YOU TO NOD OFF OR FALL ASLEEP WHILE SITTING INACTIVE IN A PUBLIC PLACE: SLIGHT CHANCE OF DOZING
HOW LIKELY ARE YOU TO NOD OFF OR FALL ASLEEP WHILE SITTING AND TALKING TO SOMEONE: WOULD NEVER DOZE

## 2024-01-24 NOTE — PROGRESS NOTES
"    New Medical Weight Management Consult    PATIENT:  David Sierra  MRN:         [id number]  :         1950  AKIN:         2024    Dear Dr. Pascual,    I had the pleasure of seeing your patient, David Sierra. Full intake/assessment was done to determine barriers to weight loss success and develop a treatment plan. David Sierra is a 73 year old male interested in treatment of medical problems associated with excess weight. He has a height of 5' 10.5\", a weight of 234 lbs 8 oz, and the calculated Body mass index is 33.17 kg/m .    ASSESSMENT/PLAN:  1. Class 1 obesity due to excess calories with serious comorbidity and body mass index (BMI) of 33.0 to 33.9 in adult  We discussed healthy habits to assist with weight loss. He will work on planning meals ahead of time using the plate method for portion control and macronutrient proportions. He will try keeping a food journal.  He will continue his walking and will start going to the [facility] for resistance training, possibly water aerobics.We discussed medication that may assist with weight loss. Metformin was prescribed. Risks/ benefits and possible side effects were discussed and questions were answered. Written information was given. He has some interest in working with our health  Janay. He may start going to [employer] for accountability.    2. Essential hypertension  This may improve with healthy habits and weight loss.     3. Impaired fasting blood sugar  This may improve with healthy habits and weight loss.      He has the following co-morbidities:        2024     1:44 PM   --   I have the following health issues associated with obesity None of the above   I have the following symptoms associated with obesity None of the above               2024     1:44 PM   Referring Provider   Please name the provider who referred you to Medical Weight Management  If you do not know, please answer \"I Don't Know\" Dr Patricio "           1/24/2024     1:44 PM   Weight History   How concerned are you about your weight? Very Concerned   I became overweight As a Child   The following factors have contributed to my weight gain Genetic (Runs in the Family)   I have tried the following methods to lose weight Watching Portions or Calories    Exercise    Atkins-type Diet (Low Carb/High Protein)   My lowest weight since age 20 was 203   My highest weight since age 20 was 265   The most weight I have ever lost was (lbs) 62   I have the following family history of obesity/being overweight My father is overweight   How has your weight changed over the last year? Gained   How many pounds? 30           1/24/2024     1:44 PM   Diet Recall Review with Patient   If you do eat breakfast, what types of food do you eat? Cereal & [place] Milk, kashi and cheerios   If you do eat lunch, what types of food do you typically eat? Sand we rich or salad; occasionslly an omelet w/out toast or bread.   If you do eat supper, what types of food do you typically eat? Protein / Cooked vegetable / Fresh vegetables  (salad) w\dressing   If you do snack, what types of food do you typically eat? Greek yogurt, fruit, Yo cracker, pretzels, crisps sometimes with [place] Milk).    - he feels he snacks to help relieve his fatigue   How many glasses of juice do you drink in a typical day? 0   How many of glasses of milk do you drink in a typical day? 2   How many 8oz glasses of sugar containing drinks such as Corbin-Aid/sweet tea do you drink in a day? 0   How many cans/bottles of sugar pop/soda/tea/sports drinks do you drink in a day? 0   How many cans/bottles of diet pop/soda/tea or sports drink do you drink in a day? 0   How often do you have a drink of alcohol? Monthly or Less   If you do drink, how many drinks might you have in a day? 1 or 2     Water 8-10 glasses per day        1/24/2024     1:44 PM   Eating Habits   Generally, my meals include foods like these bread, pasta,  rice, potatoes, corn, crackers, sweet dessert, pop, or juice A Few Times a Week   Generally, my meals include foods like these fried meats, brats, burgers, french fries, pizza, cheese, chips, or ice cream Less Than Weekly   Eat fast food (like McDonalds, Burger Joseph, Taco Bell) Less Than Weekly   Eat at a buffet or sit-down restaurant Never   Eat most of my meals in front of the TV or computer Less Than Weekly   Often skip meals, eat at random times, have no regular eating times Never   Rarely sit down for a meal but snack or graze throughout Less Than Weekly   Eat extra snacks between meals A Few Times a Week   Eat most of my food at the end of the day Never   Eat in the middle of the night or wake up at night to eat Never   Eat extra snacks to prevent or correct low blood sugar Never   Eat to prevent acid reflux or stomach pain Never   Worry about not having enough food to eat Never   I eat when I am depressed Less Than Weekly   I eat when I am stressed Less Than Weekly   I eat when I am bored Never   I eat when I am anxious Less Than Weekly   I eat when I am happy or as a reward Never   I feel hungry all the time even if I just have eaten Never   Feeling full is important to me Almost Everyday   I finish all the food on my plate even if I am already full A Few Times a Week   I can't resist eating delicious food or walk past the good food/smell Less Than Weekly   I eat/snack without noticing that I am eating Less Than Weekly   I eat when I am preparing the meal A Few Times a Week   I eat more than usual when I see others eating Less Than Weekly   I have trouble not eating sweets, ice cream, cookies, or chips if they are around the house Less Than Weekly   I think about food all day Less Than Weekly   What foods, if any, do you crave? None   Please list any other foods you crave? Bread or Viola     Meals not prepared at home per week: 2-3        1/24/2024     1:44 PM   Amount of Food   I feel out of control when  eating Never   I eat a large amount of food, like a loaf of bread, a box of cookies, a pint/quart of ice cream, all at once Never   I eat a large amount of food even when I am not hungry Never   I eat rapidly Almost Everyday   I eat alone because I feel embarrassed and do not want others to see how much I have eaten Never   I eat until I am uncomfortably full Never   I feel bad, disgusted, or guilty after I overeat Never           1/24/2024     1:44 PM   Activity/Exercise History   How much of a typical 12 hour day do you spend sitting? Most of the Day   How much of a typical 12 hour day do you spend lying down? Less Than Half the Day   How much of a typical day do you spend walking/standing? Less Than Half the Day   How many hours (not including work) do you spend on the TV/Video Games/Computer/Tablet/Phone? 2-3 Hours   How many times a week are you active for the purpose of exercise? 2-3 Times a Week   What keeps you from being more active? Pain - thighs   Other- fatigue   How many total minutes do you spend doing some activity for the purpose of exercising when you exercise? More Than 30 Minutes       PAST MEDICAL HISTORY:  Past Medical History:   Diagnosis Date    Arthritis Not sure    treated with Aspirins    Cancer (H) 2016    Skin Cancers; treated with Surgeries (3); involved back & Scalp    Hypertension 1995    Dr Lilian Gonzales (HP / Retired) diagnosed the High BP           1/24/2024     1:44 PM   Work/Social History Reviewed With Patient   My employment status is Retired   My job is --   How much of your job is spent on the computer or phone? 75%   How many hours do you spend commuting to work daily? N/A   What is your marital status? /In a Relationship   If in a relationship, is your significant other overweight? No   If you have children, are they overweight? No   Who do you live with? My wife   Who does the food shopping? We do           1/24/2024     1:44 PM   Mental Health History Reviewed With  Patient   Have you ever been physically or sexually abused? No   How often in the past 2 weeks have you felt little interest or pleasure in doing things? Not at all   Over the past 2 weeks how often have you felt down, depressed, or hopeless? Not at all           1/24/2024     1:44 PM   Sleep History Reviewed With Patient   How many hours do you sleep at night? 8       MEDICATIONS:   Current Outpatient Medications   Medication Sig Dispense Refill    atenolol (TENORMIN) 25 MG tablet Take 1 tablet (25 mg) by mouth daily 90 tablet 3    fish oil-omega-3 fatty acids 1000 MG capsule Take 2,000 mg by mouth      hydrochlorothiazide (HYDRODIURIL) 12.5 MG tablet Take 2 tablets (25 mg) by mouth daily 180 tablet 0    isosorbide mononitrate (IMDUR) 30 MG 24 hr tablet Take 1 tablet (30 mg) by mouth daily 90 tablet 3    isosorbide mononitrate (IMDUR) 60 MG 24 hr tablet Take 1 tablet (60 mg) by mouth daily 90 tablet 3    ketoconazole (NIZORAL) 2 % external cream Apply to face once daily on Monday, Wednesday and Friday. Mix 1:1 with Elidel      ketoconazole (NIZORAL) 2 % external shampoo 2 mLs      lisinopril (ZESTRIL) 40 MG tablet Take 1 tablet (40 mg) by mouth daily 90 tablet 0    tamsulosin (FLOMAX) 0.4 MG capsule Take 1 capsule (0.4 mg) by mouth daily 90 capsule 0    pimecrolimus (ELIDEL) 1 % external cream 1 g         ALLERGIES:   No Known Allergies    ROS  General  Fatigue: yes  HEENT  Hx of glaucoma: no  Vision changes: yes  Cardiovascular  Hx of heart disease: no  Chest Pain with Exertion: no  Palpitations: occasional  Pulmonary  Shortness of breath at rest: no  Shortness of breath with exertion: yes  Gastrointestinal  Heartburn: no  Pancreatitis: no  Psychiatric  Moods Stable: yes  Endocrine  Polydipsia: no  No personal or family history of medullary thyroid cancer: no  Neurologic  Hx of seizures: no  Migraine headaches: history of    Birth control: male  Kidney stones: no     PHYSICAL EXAM:  Wt Readings from Last 4  Encounters:   01/25/24 106.4 kg (234 lb 8 oz)   10/23/23 104.7 kg (230 lb 14.4 oz)   06/23/23 101.2 kg (223 lb 3.2 oz)      BP Readings from Last 3 Encounters:   01/25/24 124/78   10/23/23 138/80   06/23/23 132/74          GEN: Alert and oriented in no acute distress.   HEENT: mucous membranes moist  ABDOMEN: mild protuberance     FOLLOW-UP:   In 3 months with myself    Total time spent on the date of this encounter doing: chart review, review of test results, patient visit, physical exam, education, counseling, developing plan of care and documenting = 65 minutes.   Sincerely,    MARÍA Colin MD

## 2024-01-25 ENCOUNTER — OFFICE VISIT (OUTPATIENT)
Dept: SURGERY | Facility: CLINIC | Age: 74
End: 2024-01-25
Payer: COMMERCIAL

## 2024-01-25 VITALS
DIASTOLIC BLOOD PRESSURE: 78 MMHG | HEIGHT: 71 IN | SYSTOLIC BLOOD PRESSURE: 124 MMHG | BODY MASS INDEX: 32.83 KG/M2 | WEIGHT: 234.5 LBS

## 2024-01-25 DIAGNOSIS — I10 ESSENTIAL HYPERTENSION: ICD-10-CM

## 2024-01-25 DIAGNOSIS — R73.01 IMPAIRED FASTING BLOOD SUGAR: ICD-10-CM

## 2024-01-25 DIAGNOSIS — E66.811 CLASS 1 OBESITY DUE TO EXCESS CALORIES WITH SERIOUS COMORBIDITY AND BODY MASS INDEX (BMI) OF 33.0 TO 33.9 IN ADULT: Primary | ICD-10-CM

## 2024-01-25 DIAGNOSIS — E66.09 CLASS 1 OBESITY DUE TO EXCESS CALORIES WITH SERIOUS COMORBIDITY AND BODY MASS INDEX (BMI) OF 33.0 TO 33.9 IN ADULT: Primary | ICD-10-CM

## 2024-01-25 PROCEDURE — 99205 OFFICE O/P NEW HI 60 MIN: CPT | Performed by: FAMILY MEDICINE

## 2024-01-25 RX ORDER — METFORMIN HCL 500 MG
TABLET, EXTENDED RELEASE 24 HR ORAL
Qty: 180 TABLET | Refills: 1 | Status: SHIPPED | OUTPATIENT
Start: 2024-01-25 | End: 2024-02-27

## 2024-01-25 NOTE — LETTER
"    2024         RE: David Sierra  [address]  [facility] [address]        Dear Colleague,    Thank you for referring your patient, David Sierra, to the [facility] AND [facility] [place]. Please see a copy of my visit note below.        New Medical Weight Management Consult    PATIENT:  David Sierra  MRN:         [id number]  :         1950  AKIN:         2024    Dear Dr. Pascual,    I had the pleasure of seeing your patient, David Sierra. Full intake/assessment was done to determine barriers to weight loss success and develop a treatment plan. David Sierra is a 73 year old male interested in treatment of medical problems associated with excess weight. He has a height of 5' 10.5\", a weight of 234 lbs 8 oz, and the calculated Body mass index is 33.17 kg/m .    ASSESSMENT/PLAN:  1. Class 1 obesity due to excess calories with serious comorbidity and body mass index (BMI) of 33.0 to 33.9 in adult  We discussed healthy habits to assist with weight loss. He will work on planning meals ahead of time using the plate method for portion control and macronutrient proportions. He will try keeping a food journal.  He will continue his walking and will start going to the [facility] for resistance training, possibly water aerobics.We discussed medication that may assist with weight loss. Metformin was prescribed. Risks/ benefits and possible side effects were discussed and questions were answered. Written information was given. He has some interest in working with our health  Janay. He may start going to [employer] for accountability.    2. Essential hypertension  This may improve with healthy habits and weight loss.     3. Impaired fasting blood sugar  This may improve with healthy habits and weight loss.      He has the following co-morbidities:        2024     1:44 PM   --   I have the following health issues associated with obesity " "None of the above   I have the following symptoms associated with obesity None of the above               1/24/2024     1:44 PM   Referring Provider   Please name the provider who referred you to Medical Weight Management  If you do not know, please answer \"I Don't Know\" Dr Patricio           1/24/2024     1:44 PM   Weight History   How concerned are you about your weight? Very Concerned   I became overweight As a Child   The following factors have contributed to my weight gain Genetic (Runs in the Family)   I have tried the following methods to lose weight Watching Portions or Calories    Exercise    Atkins-type Diet (Low Carb/High Protein)   My lowest weight since age 20 was 203   My highest weight since age 20 was 265   The most weight I have ever lost was (lbs) 62   I have the following family history of obesity/being overweight My father is overweight   How has your weight changed over the last year? Gained   How many pounds? 30           1/24/2024     1:44 PM   Diet Recall Review with Patient   If you do eat breakfast, what types of food do you eat? Cereal & Pierce Milk, kashi and cheerios   If you do eat lunch, what types of food do you typically eat? Sand we rich or salad; occasionslly an omelet w/out toast or bread.   If you do eat supper, what types of food do you typically eat? Protein / Cooked vegetable / Fresh vegetables  (salad) w\dressing   If you do snack, what types of food do you typically eat? Greek yogurt, fruit, Yo cracker, pretzels, crisps sometimes with Pierce Milk).    - he feels he snacks to help relieve his fatigue   How many glasses of juice do you drink in a typical day? 0   How many of glasses of milk do you drink in a typical day? 2   How many 8oz glasses of sugar containing drinks such as Corbin-Aid/sweet tea do you drink in a day? 0   How many cans/bottles of sugar pop/soda/tea/sports drinks do you drink in a day? 0   How many cans/bottles of diet pop/soda/tea or sports drink do you " drink in a day? 0   How often do you have a drink of alcohol? Monthly or Less   If you do drink, how many drinks might you have in a day? 1 or 2     Water 8-10 glasses per day        1/24/2024     1:44 PM   Eating Habits   Generally, my meals include foods like these bread, pasta, rice, potatoes, corn, crackers, sweet dessert, pop, or juice A Few Times a Week   Generally, my meals include foods like these fried meats, brats, burgers, french fries, pizza, cheese, chips, or ice cream Less Than Weekly   Eat fast food (like McDonalds, Burger Joseph, Taco Bell) Less Than Weekly   Eat at a buffet or sit-down restaurant Never   Eat most of my meals in front of the TV or computer Less Than Weekly   Often skip meals, eat at random times, have no regular eating times Never   Rarely sit down for a meal but snack or graze throughout Less Than Weekly   Eat extra snacks between meals A Few Times a Week   Eat most of my food at the end of the day Never   Eat in the middle of the night or wake up at night to eat Never   Eat extra snacks to prevent or correct low blood sugar Never   Eat to prevent acid reflux or stomach pain Never   Worry about not having enough food to eat Never   I eat when I am depressed Less Than Weekly   I eat when I am stressed Less Than Weekly   I eat when I am bored Never   I eat when I am anxious Less Than Weekly   I eat when I am happy or as a reward Never   I feel hungry all the time even if I just have eaten Never   Feeling full is important to me Almost Everyday   I finish all the food on my plate even if I am already full A Few Times a Week   I can't resist eating delicious food or walk past the good food/smell Less Than Weekly   I eat/snack without noticing that I am eating Less Than Weekly   I eat when I am preparing the meal A Few Times a Week   I eat more than usual when I see others eating Less Than Weekly   I have trouble not eating sweets, ice cream, cookies, or chips if they are around the house  Less Than Weekly   I think about food all day Less Than Weekly   What foods, if any, do you crave? None   Please list any other foods you crave? Bread or [place]     Meals not prepared at home per week: 2-3        1/24/2024     1:44 PM   Amount of Food   I feel out of control when eating Never   I eat a large amount of food, like a loaf of bread, a box of cookies, a pint/quart of ice cream, all at once Never   I eat a large amount of food even when I am not hungry Never   I eat rapidly Almost Everyday   I eat alone because I feel embarrassed and do not want others to see how much I have eaten Never   I eat until I am uncomfortably full Never   I feel bad, disgusted, or guilty after I overeat Never           1/24/2024     1:44 PM   Activity/Exercise History   How much of a typical 12 hour day do you spend sitting? Most of the Day   How much of a typical 12 hour day do you spend lying down? Less Than Half the Day   How much of a typical day do you spend walking/standing? Less Than Half the Day   How many hours (not including work) do you spend on the TV/Video Games/Computer/Tablet/Phone? 2-3 Hours   How many times a week are you active for the purpose of exercise? 2-3 Times a Week   What keeps you from being more active? Pain - thighs   Other- fatigue   How many total minutes do you spend doing some activity for the purpose of exercising when you exercise? More Than 30 Minutes       PAST MEDICAL HISTORY:  Past Medical History:   Diagnosis Date     Arthritis Not sure    treated with Aspirins     Cancer (H) 2016    Skin Cancers; treated with Surgeries (3); involved back & Scalp     Hypertension 1995    Dr Lilian Gonzales (HP / Retired) diagnosed the High BP           1/24/2024     1:44 PM   Work/Social History Reviewed With Patient   My employment status is Retired   My job is --   How much of your job is spent on the computer or phone? 75%   How many hours do you spend commuting to work daily? N/A   What is your marital  status? /In a Relationship   If in a relationship, is your significant other overweight? No   If you have children, are they overweight? No   Who do you live with? My wife   Who does the food shopping? We do           1/24/2024     1:44 PM   Mental Health History Reviewed With Patient   Have you ever been physically or sexually abused? No   How often in the past 2 weeks have you felt little interest or pleasure in doing things? Not at all   Over the past 2 weeks how often have you felt down, depressed, or hopeless? Not at all           1/24/2024     1:44 PM   Sleep History Reviewed With Patient   How many hours do you sleep at night? 8       MEDICATIONS:   Current Outpatient Medications   Medication Sig Dispense Refill     atenolol (TENORMIN) 25 MG tablet Take 1 tablet (25 mg) by mouth daily 90 tablet 3     fish oil-omega-3 fatty acids 1000 MG capsule Take 2,000 mg by mouth       hydrochlorothiazide (HYDRODIURIL) 12.5 MG tablet Take 2 tablets (25 mg) by mouth daily 180 tablet 0     isosorbide mononitrate (IMDUR) 30 MG 24 hr tablet Take 1 tablet (30 mg) by mouth daily 90 tablet 3     isosorbide mononitrate (IMDUR) 60 MG 24 hr tablet Take 1 tablet (60 mg) by mouth daily 90 tablet 3     ketoconazole (NIZORAL) 2 % external cream Apply to face once daily on Monday, Wednesday and Friday. Mix 1:1 with Elidel       ketoconazole (NIZORAL) 2 % external shampoo 2 mLs       lisinopril (ZESTRIL) 40 MG tablet Take 1 tablet (40 mg) by mouth daily 90 tablet 0     tamsulosin (FLOMAX) 0.4 MG capsule Take 1 capsule (0.4 mg) by mouth daily 90 capsule 0     pimecrolimus (ELIDEL) 1 % external cream 1 g         ALLERGIES:   No Known Allergies    ROS  General  Fatigue: yes  HEENT  Hx of glaucoma: no  Vision changes: yes  Cardiovascular  Hx of heart disease: no  Chest Pain with Exertion: no  Palpitations: occasional  Pulmonary  Shortness of breath at rest: no  Shortness of breath with exertion: yes  Gastrointestinal  Heartburn:  no  Pancreatitis: no  Psychiatric  Moods Stable: yes  Endocrine  Polydipsia: no  No personal or family history of medullary thyroid cancer: no  Neurologic  Hx of seizures: no  Migraine headaches: history of    Birth control: male  Kidney stones: no     PHYSICAL EXAM:  Wt Readings from Last 4 Encounters:   01/25/24 106.4 kg (234 lb 8 oz)   10/23/23 104.7 kg (230 lb 14.4 oz)   06/23/23 101.2 kg (223 lb 3.2 oz)      BP Readings from Last 3 Encounters:   01/25/24 124/78   10/23/23 138/80   06/23/23 132/74          GEN: Alert and oriented in no acute distress.   HEENT: mucous membranes moist  ABDOMEN: mild protuberance     FOLLOW-UP:   In 3 months with myself    Total time spent on the date of this encounter doing: chart review, review of test results, patient visit, physical exam, education, counseling, developing plan of care and documenting = 65 minutes.   Sincerely,    MARÍA Colin MD            Again, thank you for allowing me to participate in the care of your patient.        Sincerely,        MARÍA Colin MD

## 2024-01-25 NOTE — PATIENT INSTRUCTIONS

## 2024-01-29 ENCOUNTER — VIRTUAL VISIT (OUTPATIENT)
Dept: SURGERY | Facility: CLINIC | Age: 74
End: 2024-01-29
Payer: COMMERCIAL

## 2024-01-29 DIAGNOSIS — E66.9 OBESITY (BMI 30-39.9): Primary | ICD-10-CM

## 2024-01-29 PROCEDURE — 97802 MEDICAL NUTRITION INDIV IN: CPT | Mod: 95

## 2024-01-29 NOTE — PATIENT INSTRUCTIONS
Eat Better ? Move More ? Live Well    Eat 3 nutrient-rich meals each day     Don't skip meals--it will cause you to overeat later in the day!     Eating fiber (vegetables/fruits/whole grains) and protein with meals helps you stay full longer     Choose foods with less than 10 grams of sugar and 5 grams of fat per serving to prevent excess calories and weight re-gain   Eat around the same times each day to develop a routine eating schedule    Avoid snacking unless physically hungry.   Planned snacks: 1-2 times per day and no more than 150 calories    Eat protein first    Protein helps with healing, maintaining adequate muscle mass, reducing hunger and optimizing nutritional status    Aim for 70-90 grams of protein per day   Fill up on Fiber    Fiber comes from plants--fruits, veggies, whole grains, nuts/seeds and beans    Fiber is low in calories, high in phytonutrients and helps you stay full longer    Aim for 25-35 grams per day by eating fiber with meals and snacks  Eat [id number]    Take 20-30 minutes to eat each meal by taking small bites, chewing foods to applesauce consistency or 20-30 times before you swallow    Eating foods too fast can delay satiety/fullness signals and increase overeating   Slow down your eating by using toddler utensils, putting your fork/spoon down between bites and not watching TV or emailing during meals!   Keep a Journal          Writing down what you eat, how you feel and when you are active helps you identify new changes to work on from week to week          Look for ways to cut 100 calories from your current diet 2-3 times per day  Drink 64 ounces of 0-Calorie drinks between meals    Water    Zero calorie Propel  or Vitamin Water      SoBe [place]  Zero Calories    Crystal Light , Sugar-Free Corbin-Aid , and other sugar-free lemonade or flavored bhakta    Keep Caffeine to less than 300mg per day ie: 3-6oz cups coffee     Work up to 45-60 minutes of physical activity most days of  the week    Helps with losing weight and prevent regaining those extra pounds!     Do a combo of cardio (walking/water exercises) and strength training (lifting weights/Vinyasa yoga)    Avoid Mindless Eating    Be present when you eat--take note of the smell, taste and quality of your food    Make a list of alternative activities you could do to prevent eating out of boredom/stress  Go for a walk, call a friend, chew gum, paint your nails, re-organize the garage, etc      LEAN PROTEIN SOURCES    Protein Source Portion Calories Grams of Protein                           Nonfat, plain Greek yogurt    (10 grams sugar or less) 3/4 cup (6 oz)  12-17   Light Yogurt (10 grams sugar or less) 3/4 cup (6 oz)  6-8   Protein Shake 1 shake 110-180 15-30   Skim/1% Milk or lactose-free milk 1 cup ( 8 oz)  8   Plain or light, flavored soymilk 1 cup  7-8   Plain or light, hemp milk 1 cup 110 6   Fat Free or 1% Cottage Cheese 1/2 cup 90 15   Part skim ricotta cheese 1/2 cup 100 14   Part skim or reduced fat cheese slices 1/4cup, 3 dice 65-80 8     Mozzarella String Cheese 1 80 8   Canned tuna, chicken, crab or salmon  (canned in water)  1/2 cup 100 15-20   White fish (broiled, grilled, baked) 3 ounces 100 21   [place]/Tuna (broiled, grilled, baked) 3 ounces 150-180 21   Shrimp, Scallops, Lobster, Crab 3 ounces 100 21   Pork loin, Pork Tenderloin 3 ounces 150 21   Boneless, skinless chicken /turkey breast                          (broiled, grilled, baked) 3 ounces 120 21   [place], [place], [place], and Venison 3 ounces 120 21   Lean cuts of red meat and pork (sirloin,   round, tenderloin, flank, ground 93%-96%) 3 ounces 170 21   Lean or Extra Lean Ground Turkey 1/2 cup 150 20   90-95% Lean [place] Burger 1 jr 140-180 21   Low-fat casserole with lean meat 3/4 cup 200 17   Luncheon Meats                                                        (turkey, lean ham, roast beef, chicken) 3 ounces 100 21   Egg (boiled,  poached, scrambled) 1 Egg 60 7   Egg Substitute 1/2 cup 70 10   Nuts (limit to 1 serving per day)  3 Tbsp. 150 7   Nut Breese (peanut, almond)  Limit to 1 serving or less daily 1 Tbsp. 90 4   Soy Burger (varies) 1  10-15   Edamame  1/2 cup ~95 9   Garbanzo, Black, Verduzco Beans 1/2 cup 110 7   Refried Beans 1/2 cup 100 7   Kidney and Lima beans 1/2 cup 110 7   Tempeh 3 oz 175 18   Vegan crumbles 1/2 cup 100 14   Tofu 1/2 cup 110 14   Chili (beans and extra lean beef or turkey) 1 cup 200 23   Lentil Stew/Soup 1 cup 150 12   Black Bean Soup 1 cup 175 12     Carbohydrates  Carbohydrates fuel your body with glucose (sugar)--the energy your body needs so you can do your daily activities.  Carbohydrates offer an immediate source of energy for your body. They provide the fuel for your muscles and organs, such as your brain.     Types of Carbohydrates     Complex Carbohydrates are higher in fiber and keep you feeling full longer--helping you eat less.   These are found in nearly all plant-based foods and usually take longer for the body to digest.  They are most commonly found in whole-wheat bread, whole-grain pasta, brown rice, starchy vegetables,   and fruits  Refined Carbohydrates require almost NO WORK for digestion and break down into glucose more quickly   than complex carbohydrates. Refined carbohydrates are usually high in calories and low in nutrients and fiber--  eating more of these can lead to weight gain.  Thinking about eliminating carbohydrates???  If you do not eat enough carbohydrates, the following can occur:  Fatigue  Muscle cramps  Poor mental function  Fatigue easily results from deprivation of carbohydrates, which is seen in people who fast, possibly interfering with activities of daily living.      Thinking about eliminating carbohydrates???  If you do not eat enough carbohydrates, the following can occur:  Fatigue  Muscle cramps  Poor mental function  Fatigue easily results from deprivation of  carbohydrates, which is seen in people who fast, possibly interfering with activities of daily living    Carbohydrates are your body's first choice for fuel. If given a choice of several types of foods simultaneously, your body will use the energy from carbohydrates first.    What foods contain carbohydrates?  Choose the following foods containing carbohydrates (the BEST ones to eat):   Fruit-fresh, frozen, canned in their own juices  Whole grains:  Whole-wheat breads  Brown rice  Oatmeal  Whole-grain cereals  Other starchy foods containing a minimum of 3 grams (g) fiber/100 calories  The ingredient label should list whole wheat or whole grain as one of the first ingredients (bulgur, quinoa, buckwheat, millet, spelt, faro, kasha)  Milk or yogurt (a natural source of carbohydrates):  Low-fat milk  Fat-free milk  Yogurt   Beans or legumes     Starchy vegetables, raw or frozen:  Potatoes  Peas  Corn    AVOID or limit the following foods containing carbohydrates:  Refined sugars, such as in:  Candy  Desserts-ice cream, cakes, pies, brownies, frozen yogurt, sherbet/sorbet  Cookies  White flour: bread/pasta/crackers/rice/tortillas  Sugary snacks: sweetened cereal, granola bars, cereal bars, donuts, muffins, bagels  Sugary Drinks:  Fruit Juice, Smoothies  Sports Drinks  Regular Soda    What are typical serving sizes or portions?  The following are some serving and portion sizes for foods containing carbohydrates:  One medium piece of fruit, about 4?5 ounces (oz) (-tennis ball)  1 cup (C) berries or melon    C canned fruit    C juice (100% vegetable)    C starchy vegetables, cooked or chopped  One slice whole-grain bread  ? C brown rice, quinoa, buckwheat, millet, spelt, faro, kasha    C oatmeal (dry)    C bulgur  One small tortilla (less than 6inch diameter)    C wheat germ  1 oz pretzels     C flaked cereal        Calorie-Controlled Sample Meal Plans    Examples of small healthy meals    Breakfast   Omelet made with   cup  to   cup egg substitute or 2 eggs    cup chopped vegetables  1-2 tbsp. of light cheese     cup salsa  Medium banana    1 cup non-fat plain, Greek yogurt mixed with 1 cup berries and 1-2 Tbsp nuts or cereal   -3/4 cup skim or 1% cottage cheese    cup unsweetened whole-grain cereal  1/2 cup of fresh strawberries  Whole-wheat English muffin or mini bagel, 1 scrambled egg and 1 slice Swiss cheese   Small orange  Protein Bar or Shake (15-30 grams protein and 15-25 grams Carbohydrates)    cup cottage cheese, low-fat    cup fresh fruit    11 ounces of Slim Fast Low Carb (only), Raghu's Advantage, EAS Carb Control    Lunch/Dinner  2-3 slices roasted turkey breast  1 tbsp. of fat free mayonnaise  2 slices of  whole-wheat bread, Medium apple  10 baby carrots with 1 tbsp. of low-fat dip     cup water packed tuna or chicken  1 tablespoons of low-fat mayonnaise  1-2 tbsp. dill relish  1 serving of whole-grain crackers  1 cup of strawberries   6 inch turkey sub sandwich with light mayonnaise,   cup cottage cheese                                                                                                                                                      Black bean and low-fat cheese on a whole wheat tortilla with salsa and light sour cream  Grilled chicken sandwich  Tossed salad with light dressing    Baked potato with 3/4 cup of extra lean ground beef, light shredded cheese and salsa  Fresh fruit                                                 Chicken chunks with lettuce and vegetables stuffed in ad  Steamed broccoli                                                 3 oz boneless/skinless chicken breast  1/2 cup brown rice with stir-fried vegetables    grapefruit  3 ounces of salmon, trout, or tuna  1 cup of steamed asparagus  1 small slice whole grain Italian bread  Broiled white or pink fish  3/4 cup whole wheat pasta with tomatoes  3/4 cup of roasted red peppers  3 oz. of extra lean (93/7) hamburger on a Arnold's  [place] Thins  Tossed salad with light dressing       Black bean or Tuscan bean soup with grated mozzarella cheese    of a flour tortilla    3 ounces of grilled pork loin with 1 tbsp. of low-sugar barbeque sauce, 1 cup of green beans seasoned with pepper  Small dinner roll or   cup of grapefruit sections    1-2 cups of torn jason    cup of garbanzo beans or diced skinless chicken breast  5-6 cherry tomatoes  1  tbsp. of crumbled feta cheese  1 tbsp. of roasted soy nuts  1 tsp. of olive oil and 2-3 Tbsp. of balsamic or red wine vinegar  Small whole-wheat dinner roll or   cup of cut up pineapple

## 2024-01-29 NOTE — PROGRESS NOTES
"David Sierra is a 73 year old who is being evaluated via a billable video visit.      How would you like to obtain your AVS? MyChart  If the video visit is dropped, the invitation should be resent by: Send to e-mail at: [email or web address]  Will anyone else be joining your video visit? No        Patient was advised that Medicare may not pay for this nutrition consult today. \"If Medicare doesn't pay for services, you may have to pay. Medicare does not pay for everything, even some care that you or your health care provider have good reason to think you need. We expect that Medicare may not pay for the visit today.\"     Patient accepts visit at this time.      Medical Weight Loss Initial Diet Evaluation  Assessment:  This patient was referred by Dr. Colin for MNT as treatment for Obesity.  David is presenting today for a new weight management nutrition consultation. Pt has had an initial appointment with Dr. Colin.    Weight loss medication:  Metformin .     Personal Goals: wants to lose ~25 lbs      Anthropometrics:    Pt's weight is 234.5 lbs  Initial weight: 234.5 lbs  Weight change: n/a  BMI: There is no height or weight on file to calculate BMI.   Ideal body weight: 74.1 kg (163 lb 7.5 oz)  Adjusted ideal body weight: 87 kg (191 lb 14.1 oz)  Estimated RMR ([place]-St Jeor equation):  1670 kcals x 1.2 (sedentary) = 2000 kcals (for weight maintenance)      Recommended Protein Intake: 70-90 grams of protein/day    Medical History:  Patient Active Problem List   Diagnosis    Adenomatous polyp of colon    Essential hypertension    Chronic fatigue    Impaired fasting blood sugar    Encounter for Medicare annual wellness exam    Class 1 obesity due to excess calories with serious comorbidity and body mass index (BMI) of 33.0 to 33.9 in adult    Lower urinary tract symptoms (LUTS)    Decreased hearing of both ears    Seborrheic dermatitis          Nutrition History:   Food allergies/intolerances/cultural or " religous food customs: Yes- intolerant to Shrimp and Potatoes     Weight loss history: Patient reports he has lost ~ 70lbs through [facility] (over a few years), stopped last spring. Had mild weight gain after stopping Cathy (25 lbs). Continues focusing on lean protein with greens. Weighs his food. Likes the accountability of a program.  - wants to wegiht himself weekly and send message for accountability.       Dietary Recall:  Breakfast: Cereal & Celor[place] Milk, kashi and cheerios   Lunch: 1/2 [place] or salad; occasionslly an omelet.   Dinner: Protein / Cooked vegetable / Fresh vegetables (salad) w\dressing   Typical Snacks: Greek yogurt, fruit, Yo cracker, pretzels, crisps sometimes with Celoron Milk).    - he feels he snacks to help relieve his fatigue     Beverages:   diet soda on occasion,   Water with catracho or plain, (80-100oz)    Exercise: walking, occasionally swimming     Nutrition Diagnosis (PES statement):     Obesity related to excessive energy intake as evidence by patient subjective reports and BMI of 33.17     Nutrition Intervention  Food and/or Nutrient Delivery   Placed emphasis on importance of developing a healthy meal routine, aiming for 3 meals a day and no snacks.  Discussed using a protein supplement as a meal replacement.    Nutrition Education   Discussed with patient how to build a meal: the importance of including a lean/low fat protein at each meal, include a source of vegetables at a minimum of lunch and dinner and limiting carbohydrate intake   Educated on sources of lean protein, portion sizes, the amount of grams found in each source. Recommend patient to aim for 20-30g protein at each meal.  Educated on how to read a food label: keeping total fat <10g and sugar <10g per serving.  Discussed the importance of adequate hydration, with emphasis on drinking 64oz of water or zero calorie beverages per day.  Nutrition Counseling   Encouraged importance of developing routine exercise for health  benefits and weight loss.      Goals established by patient:   Look into 24 week program   Increase protein at breakfast 20g (switch to failife milk vs almond milk for added protein)  Incorporate protein shake in the day     Handouts provided:  Intro to MWM    Assessment/Plan:    Pt will follow up in 5 month(s) with bariatrician and 2 month(s) with dietitian.       Video-Visit Details    Type of service:  Video Visit    Video Start Time (time video started): 11:05 AM    Video End Time (time video stopped): 11:32 AM    Originating Location (pt. Location): Home      Distant Location (provider location):  Off-site    Mode of Communication:  Video Conference via [facility]    Physician has received verbal consent for a Video Visit from the patient? Yes      Carrol Arellano RD

## 2024-01-29 NOTE — LETTER
"    1/29/2024         RE: David Sierra  [address]  [place] [address]        Dear Colleague,    Thank you for referring your patient, David Sierra, to the [facility] [place]. Please see a copy of my visit note below.    David Sierra is a 73 year old who is being evaluated via a billable video visit.      How would you like to obtain your AVS? MyChart  If the video visit is dropped, the invitation should be resent by: Send to e-mail at: [email or web address]  Will anyone else be joining your video visit? No        Patient was advised that Medicare may not pay for this nutrition consult today. \"If Medicare doesn't pay for services, you may have to pay. Medicare does not pay for everything, even some care that you or your health care provider have good reason to think you need. We expect that Medicare may not pay for the visit today.\"     Patient accepts visit at this time.      Medical Weight Loss Initial Diet Evaluation  Assessment:  This patient was referred by Dr. Colin for MNT as treatment for Obesity.  David is presenting today for a new weight management nutrition consultation. Pt has had an initial appointment with Dr. Colin.    Weight loss medication:  Metformin .     Personal Goals: wants to lose ~25 lbs      Anthropometrics:    Pt's weight is 234.5 lbs  Initial weight: 234.5 lbs  Weight change: n/a  BMI: There is no height or weight on file to calculate BMI.   Ideal body weight: 74.1 kg (163 lb 7.5 oz)  Adjusted ideal body weight: 87 kg (191 lb 14.1 oz)  Estimated RMR ([place]-St Jeor equation):  1670 kcals x 1.2 (sedentary) = 2000 kcals (for weight maintenance)      Recommended Protein Intake: 70-90 grams of protein/day    Medical History:  Patient Active Problem List   Diagnosis     Adenomatous polyp of colon     Essential hypertension     Chronic fatigue     Impaired fasting blood sugar     Encounter for Medicare annual wellness " exam     Class 1 obesity due to excess calories with serious comorbidity and body mass index (BMI) of 33.0 to 33.9 in adult     Lower urinary tract symptoms (LUTS)     Decreased hearing of both ears     Seborrheic dermatitis          Nutrition History:   Food allergies/intolerances/cultural or religous food customs: Yes- intolerant to Shrimp and Potatoes     Weight loss history: Patient reports he has lost ~ 70lbs through [facility] (over a few years), stopped last spring. Had mild weight gain after stopping Cathy (25 lbs). Continues focusing on lean protein with greens. Weighs his food. Likes the accountability of a program.  - wants to wegiht himself weekly and send message for accountability.       Dietary Recall:  Breakfast: Cereal & [place] Milk, kashi and cheerios   Lunch: 1/2 [place] or salad; occasionslly an omelet.   Dinner: Protein / Cooked vegetable / Fresh vegetables (salad) w\dressing   Typical Snacks: Greek yogurt, fruit, Yo cracker, pretzels, crisps sometimes with [place] Milk).    - he feels he snacks to help relieve his fatigue     Beverages:   diet soda on occasion,   Water with catracho or plain, (80-100oz)    Exercise: walking, occasionally swimming     Nutrition Diagnosis (PES statement):     Obesity related to excessive energy intake as evidence by patient subjective reports and BMI of 33.17     Nutrition Intervention  Food and/or Nutrient Delivery   Placed emphasis on importance of developing a healthy meal routine, aiming for 3 meals a day and no snacks.  Discussed using a protein supplement as a meal replacement.    Nutrition Education   Discussed with patient how to build a meal: the importance of including a lean/low fat protein at each meal, include a source of vegetables at a minimum of lunch and dinner and limiting carbohydrate intake   Educated on sources of lean protein, portion sizes, the amount of grams found in each source. Recommend patient to aim for 20-30g protein at each  meal.  Educated on how to read a food label: keeping total fat <10g and sugar <10g per serving.  Discussed the importance of adequate hydration, with emphasis on drinking 64oz of water or zero calorie beverages per day.  Nutrition Counseling   Encouraged importance of developing routine exercise for health benefits and weight loss.      Goals established by patient:   Look into 24 week program   Increase protein at breakfast 20g (switch to failife milk vs almond milk for added protein)  Incorporate protein shake in the day     Handouts provided:  Intro to MWM    Assessment/Plan:    Pt will follow up in 5 month(s) with bariatrician and 2 month(s) with dietitian.       Video-Visit Details    Type of service:  Video Visit    Video Start Time (time video started): 11:05 AM    Video End Time (time video stopped): 11:32 AM    Originating Location (pt. Location): Home      Distant Location (provider location):  Off-site    Mode of Communication:  Video Conference via [facility]    Physician has received verbal consent for a Video Visit from the patient? Yes      Carrol Arellano RD         Again, thank you for allowing me to participate in the care of your patient.        Sincerely,        Carrol Arellano RD

## 2024-01-30 ENCOUNTER — OFFICE VISIT (OUTPATIENT)
Dept: INTERNAL MEDICINE | Facility: CLINIC | Age: 74
End: 2024-01-30
Payer: COMMERCIAL

## 2024-01-30 VITALS
DIASTOLIC BLOOD PRESSURE: 93 MMHG | WEIGHT: 235.4 LBS | OXYGEN SATURATION: 97 % | TEMPERATURE: 97.7 F | SYSTOLIC BLOOD PRESSURE: 144 MMHG | HEART RATE: 67 BPM | HEIGHT: 71 IN | BODY MASS INDEX: 32.96 KG/M2 | RESPIRATION RATE: 18 BRPM

## 2024-01-30 DIAGNOSIS — M47.817 LUMBOSACRAL SPONDYLOSIS WITHOUT MYELOPATHY: ICD-10-CM

## 2024-01-30 DIAGNOSIS — I10 ESSENTIAL HYPERTENSION: Primary | ICD-10-CM

## 2024-01-30 DIAGNOSIS — E66.09 CLASS 1 OBESITY DUE TO EXCESS CALORIES WITH SERIOUS COMORBIDITY AND BODY MASS INDEX (BMI) OF 33.0 TO 33.9 IN ADULT: ICD-10-CM

## 2024-01-30 DIAGNOSIS — E66.811 CLASS 1 OBESITY DUE TO EXCESS CALORIES WITH SERIOUS COMORBIDITY AND BODY MASS INDEX (BMI) OF 33.0 TO 33.9 IN ADULT: ICD-10-CM

## 2024-01-30 DIAGNOSIS — R39.9 LOWER URINARY TRACT SYMPTOMS (LUTS): ICD-10-CM

## 2024-01-30 PROBLEM — R53.82 CHRONIC FATIGUE: Status: RESOLVED | Noted: 2023-06-23 | Resolved: 2024-01-30

## 2024-01-30 PROCEDURE — 80048 BASIC METABOLIC PNL TOTAL CA: CPT | Performed by: INTERNAL MEDICINE

## 2024-01-30 PROCEDURE — 36415 COLL VENOUS BLD VENIPUNCTURE: CPT | Performed by: INTERNAL MEDICINE

## 2024-01-30 PROCEDURE — 99214 OFFICE O/P EST MOD 30 MIN: CPT | Performed by: INTERNAL MEDICINE

## 2024-01-30 RX ORDER — HYDROCHLOROTHIAZIDE 12.5 MG/1
25 TABLET ORAL DAILY
Qty: 180 TABLET | Refills: 1 | Status: SHIPPED | OUTPATIENT
Start: 2024-01-30 | End: 2024-04-23

## 2024-01-30 RX ORDER — AMLODIPINE BESYLATE 5 MG/1
5 TABLET ORAL DAILY
Qty: 90 TABLET | Refills: 0 | Status: SHIPPED | OUTPATIENT
Start: 2024-01-30 | End: 2024-04-23

## 2024-01-30 RX ORDER — RESPIRATORY SYNCYTIAL VIRUS VACCINE 120MCG/0.5
0.5 KIT INTRAMUSCULAR ONCE
Qty: 1 EACH | Refills: 0 | Status: CANCELLED | OUTPATIENT
Start: 2024-01-30 | End: 2024-01-30

## 2024-01-30 RX ORDER — TAMSULOSIN HYDROCHLORIDE 0.4 MG/1
0.8 CAPSULE ORAL DAILY
Qty: 180 CAPSULE | Refills: 1 | Status: SHIPPED | OUTPATIENT
Start: 2024-01-30 | End: 2024-06-17

## 2024-01-30 RX ORDER — LISINOPRIL 40 MG/1
40 TABLET ORAL DAILY
Qty: 90 TABLET | Refills: 1 | Status: SHIPPED | OUTPATIENT
Start: 2024-01-30 | End: 2024-08-06

## 2024-01-30 ASSESSMENT — PAIN SCALES - GENERAL: PAINLEVEL: MODERATE PAIN (5)

## 2024-01-30 NOTE — PATIENT INSTRUCTIONS
Stop atenolol. Start amlodipine 5 mg daily. Can take this in the morning.     Increase tamsulosin to 0.8 mg daily (two tablets)     Pain clinic phone number for Dr. Bundy [phone].     Keep an eye on shortness of breath, let me know

## 2024-01-30 NOTE — ASSESSMENT & PLAN NOTE
Nocturia improved with addition of Flomax 0.4 mg nightly.  Still having some frequency and urgency during the day.  -Will try increasing Flomax to 0.8 mg nightly

## 2024-01-30 NOTE — PROGRESS NOTES
Assessment & Plan   Problem List Items Addressed This Visit          Digestive    Class 1 obesity due to excess calories with serious comorbidity and body mass index (BMI) of 33.0 to 33.9 in adult     Now following with Dr. Colin in the weight management clinic. He was started on metformin XR 25 mg daily.   He is optimistic that this medication, RD help and 24 week healthy lifestyle program will help him.    - Continue metformin and f/up with weight management clinic            Circulatory    Essential hypertension - Primary     Blood pressure is still above goal at home and in clinic today.  We are going to adjust his medication regimen:   - Stop atenolol   - Start amlodipine 5 mg daily   - Continue lisinopril 40 mg, hydrochlorothiazide 25 mg daily and imdur 90 mg daily  - Continue checking BP at home, let us know if not at goal with this change  - F/up 3-4 months         Relevant Medications    amLODIPine (NORVASC) 5 MG tablet    hydrochlorothiazide (HYDRODIURIL) 12.5 MG tablet    lisinopril (ZESTRIL) 40 MG tablet    Other Relevant Orders    Basic metabolic panel       Musculoskeletal and Integumentary    Lumbosacral spondylosis without myelopathy     Improved with RFA through Pain Clinic, Dr. Bundy, in 2018. Same pain flaring again, would like to see Dr. Bundy again. Referral placed and phone number to schedule provided in AVS.         Relevant Orders    Pain Management  Referral       Urinary    Lower urinary tract symptoms (LUTS)     Nocturia improved with addition of Flomax 0.4 mg nightly.  Still having some frequency and urgency during the day.  -Will try increasing Flomax to 0.8 mg nightly         Relevant Medications    tamsulosin (FLOMAX) 0.4 MG capsule        Ordering of each unique test  Prescription drug management  I spent a total of 30 minutes on the day of the visit.   Time spent by me doing chart review, history and exam, documentation and further activities per the note    "  BMI  Estimated body mass index is 33.3 kg/m  as calculated from the following:    Height as of this encounter: 1.791 m (5' 10.5\").    Weight as of this encounter: 106.8 kg (235 lb 6.4 oz).   Weight management plan: Discussed healthy diet and exercise guidelines    FUTURE APPOINTMENTS:       - Follow-up visit in 3-4 months       Robert Avila is a 73 year old, presenting for the following health issues:  Follow Up (3 month follow up ) and Recheck Medication (Pt has a few questions regarding medications )        1/30/2024    12:52 PM   Additional Questions   Roomed by Tl SWEENEY   Accompanied by N/A     History of Present Illness     Reason for visit:  Follow up    HTN: We increased lisinopril to 40 mg 11/20/23 b/c home blood pressures still >140. Also continues on hydrochlorothiazide 25 mg, imdur 90 mg and atenolol 25 mg daily. Home Bps still not at goal, has log with him today 140-170/80-100s. Today 144/93 here and 137/92 at home.     Obesity: saw Dr. Colin with weight mgmt 1/25/24, started metformin, f/up 3 months. Saw RD 1/29/24 as well, goal to increase protein. He was very pleased with these visits, thinks that they had great ideas and will help him be successful with weight loss. Strongly considering 24 week healthy lifestyle program.   Wt Readings from Last 4 Encounters:   01/30/24 106.8 kg (235 lb 6.4 oz)   01/25/24 106.4 kg (234 lb 8 oz)   10/23/23 104.7 kg (230 lb 14.4 oz)   06/23/23 101.2 kg (223 lb 3.2 oz)     BPH: Nocturia down to once a night since we added flomax. Still some dribbling during the day.     Back pain: This was initially an issue back in 2018. Pain significantly improved wit RFA through Dr. Bundy's office at . Interested in going back there.     He consumes 0 sweetened beverage(s) daily.He exercises with enough effort to increase his heart rate 10 to 19 minutes per day.  He exercises with enough effort to increase his heart rate 3 or less days per week.   He is taking " "medications regularly.        Review of Systems  Constitutional, neuro, ENT, endocrine, pulmonary, cardiac, gastrointestinal, genitourinary, musculoskeletal, integument and psychiatric systems are negative, except as otherwise noted.      Objective    BP (!) 144/93 (BP Location: Right arm, Patient Position: Sitting, Cuff Size: Adult Regular)   Pulse 67   Temp 97.7  F (36.5  C) (Oral)   Resp 18   Ht 1.791 m (5' 10.5\")   Wt 106.8 kg (235 lb 6.4 oz)   SpO2 97%   BMI 33.30 kg/m    Body mass index is 33.3 kg/m .  Physical Exam   GENERAL: healthy, alert and no distress  EYES: Eyes grossly normal to inspection and conjunctivae and sclerae normal  HENT: nose and mouth without ulcers or lesions  RESP: breathing comfortably and speaking in full sentences on room air with no respiratory distress or coughing  CV: warm and well perfused  SKIN: no suspicious lesions or rashes on exposed skin  NEURO: No focal deficits, mentation intact and speech normal  PSYCH: mentation appears normal, affect normal/bright      No results found for this or any previous visit (from the past 24 hour(s)).        Signed Electronically by: Yusra Pascual MD    "

## 2024-01-30 NOTE — ASSESSMENT & PLAN NOTE
Blood pressure is still above goal at home and in clinic today.  We are going to adjust his medication regimen:   - Stop atenolol   - Start amlodipine 5 mg daily   - Continue lisinopril 40 mg, hydrochlorothiazide 25 mg daily and imdur 90 mg daily  - Continue checking BP at home, let us know if not at goal with this change  - F/up 3-4 months

## 2024-01-30 NOTE — ASSESSMENT & PLAN NOTE
Improved with RFA through Pain Clinic, Dr. Bundy, in 2018. Same pain flaring again, would like to see Dr. Bundy again. Referral placed and phone number to schedule provided in AVS.

## 2024-01-31 LAB
ANION GAP SERPL CALCULATED.3IONS-SCNC: 9 MMOL/L (ref 7–15)
BUN SERPL-MCNC: 26.1 MG/DL (ref 8–23)
CALCIUM SERPL-MCNC: 9.5 MG/DL (ref 8.8–10.2)
CHLORIDE SERPL-SCNC: 99 MMOL/L (ref 98–107)
CREAT SERPL-MCNC: 0.9 MG/DL (ref 0.67–1.17)
DEPRECATED HCO3 PLAS-SCNC: 28 MMOL/L (ref 22–29)
EGFRCR SERPLBLD CKD-EPI 2021: >90 ML/MIN/1.73M2
GLUCOSE SERPL-MCNC: 83 MG/DL (ref 70–99)
POTASSIUM SERPL-SCNC: 4.5 MMOL/L (ref 3.4–5.3)
SODIUM SERPL-SCNC: 136 MMOL/L (ref 135–145)

## 2024-02-05 ENCOUNTER — VIRTUAL VISIT (OUTPATIENT)
Dept: SURGERY | Facility: CLINIC | Age: 74
End: 2024-02-05

## 2024-02-05 ENCOUNTER — MYC MEDICAL ADVICE (OUTPATIENT)
Dept: INTERNAL MEDICINE | Facility: CLINIC | Age: 74
End: 2024-02-05

## 2024-02-05 DIAGNOSIS — E66.9 OBESITY: Primary | ICD-10-CM

## 2024-02-05 PROCEDURE — 99207 PR MWM HEALTH COACH NO CHARGE: CPT | Mod: 93

## 2024-02-05 NOTE — PROGRESS NOTES
Reason for Visit: Q&A - Complimentary Consultation New 24-Week Healthy Lifestyle Plan    David Sierra   MRN: [id number]  : 1950  AKIN: 2024    Complimentary Q&A ~ no charge  Provider: Janay Polk [facility]  Location: off-site/home office  Start time: 1:30 pm  End time: 1:45 pm    15 minute conversation with patient to share more information about the 24-week Health Lifestyle Plan, what is included, timeline and options post the 6-month plan.    Patient expressed he is NOT interested in the program at this time due to timeline of only 6 months and that the visits with the Health &  and Registered Dietitian are virtual.  He expressed a desire to have weekly accountability and something/program that perhaps does not have an end date but can be ongoing long-term. I did share that we offer 3-packs of Health & Wellness Coaching to continue opportunities for accountability check-ins. Again, patient does NOT feel this is adequate for what he is looking for.     SIGNATURE:  KAREN Talbot, [facility]  National Board-Certified Health &   24 Week Healthy Lifestyle Program Health   [facility]  email:  [email or web address]  appointment [phone]

## 2024-02-05 NOTE — LETTER
2024         RE: David Sierra  [address]  [place] [facility] [address]        Dear Colleague,    Thank you for referring your patient, David Sierra, to the [facility] [place]. Please see a copy of my visit note below.    Reason for Visit: Q&A - Complimentary Consultation New 24-Week Healthy Lifestyle Plan    David Sierra   MRN: [id number]  : 1950  AKIN: 2024    Complimentary Q&A ~ no charge  Provider: Janay Polk [facility]  Location: off-site/home office  Start time: 1:30 pm  End time: 1:45 pm    15 minute conversation with patient to share more information about the 24-week Health Lifestyle Plan, what is included, timeline and options post the 6-month plan.    Patient expressed he is NOT interested in the program at this time due to timeline of only 6 months and that the visits with the Health &  and Registered Dietitian are virtual.  He expressed a desire to have weekly accountability and something/program that perhaps does not have an end date but can be ongoing long-term. I did share that we offer 3-packs of Health & Wellness Coaching to continue opportunities for accountability check-ins. Again, patient does NOT feel this is adequate for what he is looking for.     SIGNATURE:  KAREN Talbot, [facility]  National Board-Certified Health &   24 Week Healthy Lifestyle Program Health   [facility]  email:  [email or web address]  appointment [phone]      Again, thank you for allowing me to participate in the care of your patient.        Sincerely,        Janay Polk

## 2024-02-20 ENCOUNTER — TELEPHONE (OUTPATIENT)
Dept: INTERNAL MEDICINE | Facility: CLINIC | Age: 74
End: 2024-02-20
Payer: COMMERCIAL

## 2024-02-20 NOTE — TELEPHONE ENCOUNTER
Received message from Dr. Ramos. Patient had scheduled appointment with him, however my intention with referral at our visit was for patient to go back to [facility] to see Dr. Bundy. Please call patient to let him know to call Dr. Bundy's office to schedule (Pain clinic phone number for Dr. Bundy [phone]). May also need to fax referral there since it is outside of our system.

## 2024-02-20 NOTE — TELEPHONE ENCOUNTER
Spoke with patient and relayed message. He verbalized understanding and states that he has been trying to schedule with the pain clinic all day. He was on hold with them for 45 minutes and then the call dropped. He will try online as well to see if he can get an appointment scheduled but he is trying his best. Patient thanked writer for the call.    Referral faxed out as well.

## 2024-02-24 NOTE — PROGRESS NOTES
[facility] Non Surgical Follow up Visit   Date of visit: 2/27/2024  Physician: MARÍA Colin MD, MD  Primary Care is Yusra Pascual.  David Sierra   73 year old  male     Initial Weight: 234#  Initial BMI: 33.17  Today's Weight:   Wt Readings from Last 1 Encounters:   02/27/24 108.1 kg (238 lb 6.4 oz)     Body mass index is 33.72 kg/m .           Assessment and Plan   Assessment: David is a 73 year old year old male who presents for medical weight management.      Plan:    1. Obesity (BMI 30-39.9)  Patient was congratulated on the healthy changes he has made thus far. He has a membership at [facility] through March and will check into starting classes there. He also has interest in joining [employer].  He will continue the metformin and will try increasing the dose to 3 tablets per day. He will try to make sure he is getting 30 grams of protein with breakfast and lunch and decrease his starch intake. We discussed the patient's co-morbid conditions including hypertension and impaired fasting blood sugar. These likely will improve with healthy habits and weight loss.     2. Essential hypertension  This may improve with healthy habits and weight loss.     3. Impaired fasting blood sugar  This may improve with healthy habits and weight loss.      Follow up in 1 month with the dietician and in 3 months with myself            INTERIM HISTORY  Patient started metformin after his initial consult in January. He thinks that it is decreasing his appetite in the afternoon and decrease his cravings. He thinks he is eating less yet is gaining weight. He is not always making good choices with his food.     Goals established by patient:   Look into 24 week program -not a fit with health   Increase protein at breakfast 20g (switch to failife milk vs almond milk for added protein)  Incorporate protein shake in the day     DIETARY HISTORY  Meals Per Day: 3  Eating Protein First?:  "inadequate protein at breakfast and lunch  Food Diary: B:1/2 protein bar and soy milk or bowl of cereal L:sandwich with chicken noodle soup D:protein and vegetable and salad  Snacks Per Day: occasional  Typical Snack: protein drink  Fluid Intake: not discussed  Portion Control: yes  Calorie Containing Beverages: no    Positive Changes Since Last Visit: decreased snacking  Struggling With: protein at breakfast and lunch    Knowledgeable in Reading Food Labels: yes  Getting Adequate Protein: sometimes    PHYSICAL ACTIVITY PATTERNS:  He went to the gym on a couple of occasions, equipment was overwhelming, he has not tried classes or pool    REVIEW OF SYSTEMS  GENERAL/CONSTITUTIONAL:  Fatigue: sometimes  HEENT:   glaucoma: no  CARDIOVASCULAR:  History of heart disease: yes  PSYCHIATRIC:  Moods: stale  MUSCULOSKELETAL/RHEUMATOLOGIC  Arthralgias: yes  Myalgias: yes  ENDOCRINE:  Monitoring Blood Sugars: no  Sugars Well Controlled: na       Patient Profile   Social History     Social History Narrative    Not on file        Past Medical History   Past Medical History:   Diagnosis Date    Arthritis Not sure    treated with Aspirins    Cancer (H) 2016    Skin Cancers; treated with Surgeries (3); involved back & Scalp    Hypertension 1995    Dr Lilian Gonzales (HP / Retired) diagnosed the High BP     Patient Active Problem List   Diagnosis    Adenomatous polyp of colon    Essential hypertension    Impaired fasting blood sugar    Encounter for Medicare annual wellness exam    Class 1 obesity due to excess calories with serious comorbidity and body mass index (BMI) of 33.0 to 33.9 in adult    Lower urinary tract symptoms (LUTS)    Decreased hearing of both ears    Seborrheic dermatitis    Lumbosacral spondylosis without myelopathy       Past Surgical History  He has a past surgical history that includes orthopedic surgery (2016).     Examination   /84   Ht 1.791 m (5' 10.5\")   Wt 108.1 kg (238 lb 6.4 oz)   BMI 33.72 kg/m  "   Wt Readings from Last 4 Encounters:   02/27/24 108.1 kg (238 lb 6.4 oz)   01/30/24 106.8 kg (235 lb 6.4 oz)   01/25/24 106.4 kg (234 lb 8 oz)   10/23/23 104.7 kg (230 lb 14.4 oz)      GEN: Alert and oriented in no acute distress.   HEENT: mucous membranes moist  ABDOMEN: mild protuberance        Counseling:   We reviewed the important post op bariatric recommendations:  -eating 3 meals daily  -eating protein first, getting >60gm protein daily  -eating slowly, chewing food well  -avoiding/limiting calorie containing beverages  -limiting starchy vegetables and carbohydrates, choosing wheat, not white with breads,   crackers, pastas, ad, bagels, tortillas, rice  -limiting restaurant or cafeteria eating to twice a week or less    We discussed the importance of restorative sleep and stress management in maintaining a healthy weight.  We discussed the National Weight Control Registry healthy weight maintenance strategies and ways to optimize metabolism.  We discussed the importance of physical activity including cardiovascular and strength training in maintaining a healthier weight.    Total time spent on the date of this encounter doing: chart review, review of test results, patient visit, physical exam, education, counseling, developing plan of care and documenting = 31 minutes.         MARÍA Colin MD  [facility]

## 2024-02-27 ENCOUNTER — OFFICE VISIT (OUTPATIENT)
Dept: SURGERY | Facility: CLINIC | Age: 74
End: 2024-02-27
Payer: COMMERCIAL

## 2024-02-27 VITALS
HEIGHT: 71 IN | SYSTOLIC BLOOD PRESSURE: 128 MMHG | WEIGHT: 238.4 LBS | DIASTOLIC BLOOD PRESSURE: 84 MMHG | BODY MASS INDEX: 33.38 KG/M2

## 2024-02-27 DIAGNOSIS — R73.01 IMPAIRED FASTING BLOOD SUGAR: ICD-10-CM

## 2024-02-27 DIAGNOSIS — I10 ESSENTIAL HYPERTENSION: ICD-10-CM

## 2024-02-27 DIAGNOSIS — E66.811 CLASS 1 OBESITY DUE TO EXCESS CALORIES WITH SERIOUS COMORBIDITY AND BODY MASS INDEX (BMI) OF 33.0 TO 33.9 IN ADULT: ICD-10-CM

## 2024-02-27 DIAGNOSIS — E66.9 OBESITY (BMI 30-39.9): Primary | ICD-10-CM

## 2024-02-27 DIAGNOSIS — E66.09 CLASS 1 OBESITY DUE TO EXCESS CALORIES WITH SERIOUS COMORBIDITY AND BODY MASS INDEX (BMI) OF 33.0 TO 33.9 IN ADULT: ICD-10-CM

## 2024-02-27 PROCEDURE — 99214 OFFICE O/P EST MOD 30 MIN: CPT | Performed by: FAMILY MEDICINE

## 2024-02-27 RX ORDER — METFORMIN HCL 500 MG
TABLET, EXTENDED RELEASE 24 HR ORAL
Qty: 270 TABLET | Refills: 1 | Status: SHIPPED | OUTPATIENT
Start: 2024-02-27

## 2024-02-27 NOTE — LETTER
2/27/2024         RE: David Sierra  [address]  [place] [facility] [address]        Dear Colleague,    Thank you for referring your patient, David Sierra, to the [facility] [place]. Please see a copy of my visit note below.    Bariatric Care Clinic Non Surgical Follow up Visit   Date of visit: 2/27/2024  Physician: MARÍA Colin MD, MD  Primary Care is Yusra Pascual.  David Sierra   73 year old  male     Initial Weight: 234#  Initial BMI: 33.17  Today's Weight:   Wt Readings from Last 1 Encounters:   02/27/24 108.1 kg (238 lb 6.4 oz)     Body mass index is 33.72 kg/m .           Assessment and Plan   Assessment: David is a 73 year old year old male who presents for medical weight management.      Plan:    1. Obesity (BMI 30-39.9)  Patient was congratulated on the healthy changes he has made thus far. He has a membership at [facility] through March and will check into starting classes there. He also has interest in joining [employer].  He will continue the metformin and will try increasing the dose to 3 tablets per day. He will try to make sure he is getting 30 grams of protein with breakfast and lunch and decrease his starch intake. We discussed the patient's co-morbid conditions including hypertension and impaired fasting blood sugar. These likely will improve with healthy habits and weight loss.     2. Essential hypertension  This may improve with healthy habits and weight loss.     3. Impaired fasting blood sugar  This may improve with healthy habits and weight loss.      Follow up in 1 month with the dietician and in 3 months with myself            INTERIM HISTORY  Patient started metformin after his initial consult in January. He thinks that it is decreasing his appetite in the afternoon and decrease his cravings. He thinks he is eating less yet is gaining weight. He is not always making good choices with his food.      Goals established by patient:   Look into 24 week program -not a fit with health   Increase protein at breakfast 20g (switch to failife milk vs almond milk for added protein)  Incorporate protein shake in the day     DIETARY HISTORY  Meals Per Day: 3  Eating Protein First?: inadequate protein at breakfast and lunch  Food Diary: B:1/2 protein bar and soy milk or bowl of cereal L:sandwich with chicken noodle soup D:protein and vegetable and salad  Snacks Per Day: occasional  Typical Snack: protein drink  Fluid Intake: not discussed  Portion Control: yes  Calorie Containing Beverages: no    Positive Changes Since Last Visit: decreased snacking  Struggling With: protein at breakfast and lunch    Knowledgeable in Reading Food Labels: yes  Getting Adequate Protein: sometimes    PHYSICAL ACTIVITY PATTERNS:  He went to the gym on a couple of occasions, equipment was overwhelming, he has not tried classes or pool    REVIEW OF SYSTEMS  GENERAL/CONSTITUTIONAL:  Fatigue: sometimes  HEENT:   glaucoma: no  CARDIOVASCULAR:  History of heart disease: yes  PSYCHIATRIC:  Moods: stale  MUSCULOSKELETAL/RHEUMATOLOGIC  Arthralgias: yes  Myalgias: yes  ENDOCRINE:  Monitoring Blood Sugars: no  Sugars Well Controlled: na       Patient Profile   Social History     Social History Narrative     Not on file        Past Medical History   Past Medical History:   Diagnosis Date     Arthritis Not sure    treated with Aspirins     Cancer (H) 2016    Skin Cancers; treated with Surgeries (3); involved back & Scalp     Hypertension 1995    Dr Lilian Gonzales (HP / Retired) diagnosed the High BP     Patient Active Problem List   Diagnosis     Adenomatous polyp of colon     Essential hypertension     Impaired fasting blood sugar     Encounter for Medicare annual wellness exam     Class 1 obesity due to excess calories with serious comorbidity and body mass index (BMI) of 33.0 to 33.9 in adult     Lower urinary tract symptoms (LUTS)     Decreased  "hearing of both ears     Seborrheic dermatitis     Lumbosacral spondylosis without myelopathy       Past Surgical History  He has a past surgical history that includes orthopedic surgery (2016).     Examination   /84   Ht 1.791 m (5' 10.5\")   Wt 108.1 kg (238 lb 6.4 oz)   BMI 33.72 kg/m    Wt Readings from Last 4 Encounters:   02/27/24 108.1 kg (238 lb 6.4 oz)   01/30/24 106.8 kg (235 lb 6.4 oz)   01/25/24 106.4 kg (234 lb 8 oz)   10/23/23 104.7 kg (230 lb 14.4 oz)      GEN: Alert and oriented in no acute distress.   HEENT: mucous membranes moist  ABDOMEN: mild protuberance        Counseling:   We reviewed the important post op bariatric recommendations:  -eating 3 meals daily  -eating protein first, getting >60gm protein daily  -eating slowly, chewing food well  -avoiding/limiting calorie containing beverages  -limiting starchy vegetables and carbohydrates, choosing wheat, not white with breads,   crackers, pastas, ad, bagels, tortillas, rice  -limiting restaurant or cafeteria eating to twice a week or less    We discussed the importance of restorative sleep and stress management in maintaining a healthy weight.  We discussed the National Weight Control Registry healthy weight maintenance strategies and ways to optimize metabolism.  We discussed the importance of physical activity including cardiovascular and strength training in maintaining a healthier weight.    Total time spent on the date of this encounter doing: chart review, review of test results, patient visit, physical exam, education, counseling, developing plan of care and documenting = 31 minutes.         MARÍA Colin MD  [facility]               Again, thank you for allowing me to participate in the care of your patient.        Sincerely,        MARÍA Colin MD  "

## 2024-02-29 ENCOUNTER — MYC MEDICAL ADVICE (OUTPATIENT)
Dept: INTERNAL MEDICINE | Facility: CLINIC | Age: 74
End: 2024-02-29
Payer: COMMERCIAL

## 2024-03-19 ENCOUNTER — VIRTUAL VISIT (OUTPATIENT)
Dept: SURGERY | Facility: CLINIC | Age: 74
End: 2024-03-19
Payer: COMMERCIAL

## 2024-03-19 DIAGNOSIS — Z71.3 NUTRITIONAL COUNSELING: ICD-10-CM

## 2024-03-19 DIAGNOSIS — E66.9 OBESITY (BMI 30-39.9): Primary | ICD-10-CM

## 2024-03-19 PROCEDURE — 97803 MED NUTRITION INDIV SUBSEQ: CPT | Mod: 95

## 2024-03-19 NOTE — LETTER
"    3/19/2024         RE: David Sierra  [address]  [place] [facility] [address]        Dear Colleague,    Thank you for referring your patient, David Sierra, to the [facility] SURG[facility] CLINIC AND BARIATRICS CARE [place]. Please see a copy of my visit note below.    David Sierra is a 74 year old who is being evaluated via a billable video visit.      How would you like to obtain your AVS? MyChart  If the video visit is dropped, the invitation should be resent by: Send to e-mail at: [email or web address]  Will anyone else be joining your video visit? No     Patient was advised that Medicare may not pay for this nutrition consult today. \"If Medicare doesn't pay for services, you may have to pay. Medicare does not pay for everything, even some care that you or your health care provider have good reason to think you need. We expect that Medicare may not pay for the visit today.\"      Patient accepts visit at this time.      Medical  Weight Loss Follow-Up Diet Evaluation  Assessment:  David is presenting today for a follow up weight management nutrition consultation.  This patient has had an initial appointment and was referred by Dr. oClin for MNT as treatment for Obesity.  Weight loss medication:  Metformin .   Pt's weight is 234 lbs  Initial weight: 234.5 lbs  Weight change: no change         2/20/2024    12:45 PM   Changes and Difficulties   I have made the following changes to my diet since my last visit: Added Protein.  Increased Fluids   With regards to my diet, I am still struggling with: Weight is plateaued   I have made the following changes to my activity/exercise since my last visit: As weather has warmed, walking has increased.  Reach more than my  7,000 step goal most days.  Hope to reach 10,00 steps / day by early April.   With regards to my activity/exercise, I am still struggling with: I am working without a  fitness routine. Simply access to the equipment  isn't sufficient. "     BMI: There is no height or weight on file to calculate BMI.  Ideal body weight: 74.1 kg (163 lb 7.5 oz)  Adjusted ideal body weight: 87.7 kg (193 lb 7.1 oz)    Estimated RMR ([place]-St Jeor equation):   1670 kcals x 1.2 (sedentary) = 2000 kcals (for weight maintenance)  Recommended Protein Intake: 70-90 grams of protein/day  Patient Active Problem List:  Patient Active Problem List   Diagnosis     Adenomatous polyp of colon     Essential hypertension     Impaired fasting blood sugar     Encounter for Medicare annual wellness exam     Class 1 obesity due to excess calories with serious comorbidity and body mass index (BMI) of 33.0 to 33.9 in adult     Lower urinary tract symptoms (LUTS)     Decreased hearing of both ears     Seborrheic dermatitis     Lumbosacral spondylosis without myelopathy       Progress on goals from last visit: Patient reports he purchased an apple watch to track steps and track calorie intake. Weight has remained stable at this time.  Noted he is tracking his steps via apple watch. Walking is difficult d/t reconstructive surgery on his ankle and arthritis in the lower back.   - comfortable reading food labels  - thinks he needs to start incorporating salads at lunch time again     Look into 24 week program - met discussed with sander   Increase protein at breakfast 20g (switch to failife milk vs almond milk for added protein) - met   Incorporate protein shake in the day - met     Dietary Recall:   intolerant to Shrimp and Potatoes    Breakfast: protein shakes Or energy bars ([place] cake granola bar Or june protein bar)   Lunch: [place]  Dinner: Protein / Cooked vegetable / Fresh vegetables (salad) w\dressing      Beverages:   [place] milk   diet soda on occasion,   Water with catracho or plain, (80-100oz)  Exercise: walking 2-3 per day (averaging 7,500k steps per day)     Nutrition Diagnosis:    Obesity related to excessive energy intake as evidence by patient subjective reports and BMI of  33.17          Intervention:  Food and/or nutrient delivery: discussed calorie goals, protein goals and activity goals. Discussed 10/10 rule and eating protein first.   Nutrition education: discussed probiotics per patient request   Nutrition counseling: continued support and goal setting     Monitoring/Evaluation:    Goals:  Follow 10/10 rule   Aim for 1,300-1,500 kcal and 70-90g protein (tracking via apple watch)  Continue tracking steps throughout the day aiming for 10k per patient goal    Patient to follow up in 4 month(s) with bariatrician and 1.5 month(s) with RD        Video-Visit Details    Type of service:  Video Visit    Video Start Time (time video started): 9:32 AM    Video End Time (time video stopped): 10:00 AM    Originating Location (pt. Location): Home      Distant Location (provider location):  Off-site    Mode of Communication:  Video Conference via [facility]    Physician has received verbal consent for a Video Visit from the patient? Yes      Carrol Arellano RD           Again, thank you for allowing me to participate in the care of your patient.        Sincerely,        Carrol Arellano RD

## 2024-03-19 NOTE — PROGRESS NOTES
"David Sierra is a 74 year old who is being evaluated via a billable video visit.      How would you like to obtain your AVS? MyChart  If the video visit is dropped, the invitation should be resent by: Send to e-mail at: [email or web address]  Will anyone else be joining your video visit? No     Patient was advised that Medicare may not pay for this nutrition consult today. \"If Medicare doesn't pay for services, you may have to pay. Medicare does not pay for everything, even some care that you or your health care provider have good reason to think you need. We expect that Medicare may not pay for the visit today.\"      Patient accepts visit at this time.      Medical  Weight Loss Follow-Up Diet Evaluation  Assessment:  David is presenting today for a follow up weight management nutrition consultation.  This patient has had an initial appointment and was referred by Dr. Colin for MNT as treatment for Obesity.  Weight loss medication:  Metformin .   Pt's weight is 234 lbs  Initial weight: 234.5 lbs  Weight change: no change         2/20/2024    12:45 PM   Changes and Difficulties   I have made the following changes to my diet since my last visit: Added Protein.  Increased Fluids   With regards to my diet, I am still struggling with: Weight is plateaued   I have made the following changes to my activity/exercise since my last visit: As weather has warmed, walking has increased.  Reach more than my  7,000 step goal most days.  Hope to reach 10,00 steps / day by early April.   With regards to my activity/exercise, I am still struggling with: I am working without a  fitness routine. Simply access to the equipment  isn't sufficient.     BMI: There is no height or weight on file to calculate BMI.  Ideal body weight: 74.1 kg (163 lb 7.5 oz)  Adjusted ideal body weight: 87.7 kg (193 lb 7.1 oz)    Estimated RMR ([place]-St Jeor equation):   1670 kcals x 1.2 (sedentary) = 2000 kcals (for weight maintenance)  Recommended " Protein Intake: 70-90 grams of protein/day  Patient Active Problem List:  Patient Active Problem List   Diagnosis    Adenomatous polyp of colon    Essential hypertension    Impaired fasting blood sugar    Encounter for Medicare annual wellness exam    Class 1 obesity due to excess calories with serious comorbidity and body mass index (BMI) of 33.0 to 33.9 in adult    Lower urinary tract symptoms (LUTS)    Decreased hearing of both ears    Seborrheic dermatitis    Lumbosacral spondylosis without myelopathy       Progress on goals from last visit: Patient reports he purchased an apple watch to track steps and track calorie intake. Weight has remained stable at this time.  Noted he is tracking his steps via apple watch. Walking is difficult d/t reconstructive surgery on his ankle and arthritis in the lower back.   - comfortable reading food labels  - thinks he needs to start incorporating salads at lunch time again     Look into 24 week program - met discussed with sander   Increase protein at breakfast 20g (switch to failife milk vs almond milk for added protein) - met   Incorporate protein shake in the day - met     Dietary Recall:   intolerant to Shrimp and Potatoes    Breakfast: protein shakes Or energy bars ([place] cake granola bar Or june protein bar)   Lunch: [place]  Dinner: Protein / Cooked vegetable / Fresh vegetables (salad) w\dressing      Beverages:   [place] milk   diet soda on occasion,   Water with catracho or plain, (80-100oz)  Exercise: walking 2-3 per day (averaging 7,500k steps per day)     Nutrition Diagnosis:    Obesity related to excessive energy intake as evidence by patient subjective reports and BMI of 33.17          Intervention:  Food and/or nutrient delivery: discussed calorie goals, protein goals and activity goals. Discussed 10/10 rule and eating protein first.   Nutrition education: discussed probiotics per patient request   Nutrition counseling: continued support and goal setting      Monitoring/Evaluation:    Goals:  Follow 10/10 rule   Aim for 1,300-1,500 kcal and 70-90g protein (tracking via apple watch)  Continue tracking steps throughout the day aiming for 10k per patient goal    Patient to follow up in 4 month(s) with bariatrician and 1.5 month(s) with RD        Video-Visit Details    Type of service:  Video Visit    Video Start Time (time video started): 9:32 AM    Video End Time (time video stopped): 10:00 AM    Originating Location (pt. Location): Home      Distant Location (provider location):  Off-site    Mode of Communication:  Video Conference via [facility]    Physician has received verbal consent for a Video Visit from the patient? Yes      Carrol Arellano RD

## 2024-04-23 ENCOUNTER — OFFICE VISIT (OUTPATIENT)
Dept: INTERNAL MEDICINE | Facility: CLINIC | Age: 74
End: 2024-04-23
Attending: INTERNAL MEDICINE
Payer: COMMERCIAL

## 2024-04-23 VITALS
BODY MASS INDEX: 33.07 KG/M2 | HEIGHT: 71 IN | OXYGEN SATURATION: 95 % | HEART RATE: 100 BPM | RESPIRATION RATE: 14 BRPM | DIASTOLIC BLOOD PRESSURE: 70 MMHG | TEMPERATURE: 97.7 F | SYSTOLIC BLOOD PRESSURE: 124 MMHG | WEIGHT: 236.2 LBS

## 2024-04-23 DIAGNOSIS — E66.09 CLASS 1 OBESITY DUE TO EXCESS CALORIES WITH SERIOUS COMORBIDITY AND BODY MASS INDEX (BMI) OF 33.0 TO 33.9 IN ADULT: ICD-10-CM

## 2024-04-23 DIAGNOSIS — I10 ESSENTIAL HYPERTENSION: Primary | ICD-10-CM

## 2024-04-23 DIAGNOSIS — L98.9 SKIN LESION: ICD-10-CM

## 2024-04-23 DIAGNOSIS — M47.817 LUMBOSACRAL SPONDYLOSIS WITHOUT MYELOPATHY: ICD-10-CM

## 2024-04-23 DIAGNOSIS — I10 ESSENTIAL HYPERTENSION: ICD-10-CM

## 2024-04-23 DIAGNOSIS — E66.811 CLASS 1 OBESITY DUE TO EXCESS CALORIES WITH SERIOUS COMORBIDITY AND BODY MASS INDEX (BMI) OF 33.0 TO 33.9 IN ADULT: ICD-10-CM

## 2024-04-23 PROCEDURE — G2211 COMPLEX E/M VISIT ADD ON: HCPCS | Performed by: INTERNAL MEDICINE

## 2024-04-23 PROCEDURE — 99214 OFFICE O/P EST MOD 30 MIN: CPT | Performed by: INTERNAL MEDICINE

## 2024-04-23 RX ORDER — RESPIRATORY SYNCYTIAL VIRUS VACCINE 120MCG/0.5
0.5 KIT INTRAMUSCULAR ONCE
Qty: 1 EACH | Refills: 0 | Status: CANCELLED | OUTPATIENT
Start: 2024-04-23 | End: 2024-04-23

## 2024-04-23 RX ORDER — HYDROCHLOROTHIAZIDE 12.5 MG/1
12.5 TABLET ORAL DAILY
Qty: 90 TABLET | Refills: 1 | Status: SHIPPED | OUTPATIENT
Start: 2024-04-23

## 2024-04-23 RX ORDER — AMLODIPINE BESYLATE 5 MG/1
5 TABLET ORAL DAILY
Qty: 90 TABLET | Refills: 0 | Status: SHIPPED | OUTPATIENT
Start: 2024-04-23 | End: 2024-04-23

## 2024-04-23 RX ORDER — AMLODIPINE BESYLATE 5 MG/1
5 TABLET ORAL DAILY
Qty: 90 TABLET | Refills: 1 | Status: SHIPPED | OUTPATIENT
Start: 2024-04-23 | End: 2024-05-17

## 2024-04-23 NOTE — ASSESSMENT & PLAN NOTE
Lesion on the back of his neck is very suspicious for BCC.  He has a dermatologist that he follows with, will send them a picture and try to move up his follow-up that is currently scheduled in September.

## 2024-04-23 NOTE — ASSESSMENT & PLAN NOTE
Improved with [facility] through [facility], Dr. Bundy, in 2018. Same pain flaring again, and was able to see Dr. Bundy again last week. Awaiting insurance approval of another RFA.

## 2024-04-23 NOTE — ASSESSMENT & PLAN NOTE
Blood pressure much improved with the addition of amlodipine, almost too much as he has had some low pressures at home with associated lightheadedness and dizziness.  Feel the amlodipine is helpful, will try to calm down some of his other medications to avoid some of the low blood pressures.  -Decrease hydrochlorothiazide from 25 to 12.5 mg daily  -Continue amlodipine 5 mg daily, lisinopril 40 mg daily and Imdur 90 mg daily  -Check BMP in 2 weeks  -Continue checking blood pressure at home, let us know if too high or too low  -Follow-up in 3 months

## 2024-04-23 NOTE — ASSESSMENT & PLAN NOTE
Weight stable today. Is following with weight management clinic.  We discussed continuing great work on his dietary modifications.  I am hopeful that if he is able to get RFA with the pain clinic he will be able to increase his exercise as well.  - Continue metformin along with lifestyle modifications

## 2024-04-23 NOTE — PROGRESS NOTES
Assessment & Plan   Problem List Items Addressed This Visit          Digestive    Class 1 obesity due to excess calories with serious comorbidity and body mass index (BMI) of 33.0 to 33.9 in adult     Weight stable today. Is following with weight management clinic.  We discussed continuing great work on his dietary modifications.  I am hopeful that if he is able to get RFA with the pain clinic he will be able to increase his exercise as well.  - Continue metformin along with lifestyle modifications             Circulatory    Essential hypertension - Primary     Blood pressure much improved with the addition of amlodipine, almost too much as he has had some low pressures at home with associated lightheadedness and dizziness.  Feel the amlodipine is helpful, will try to calm down some of his other medications to avoid some of the low blood pressures.  -Decrease hydrochlorothiazide from 25 to 12.5 mg daily  -Continue amlodipine 5 mg daily, lisinopril 40 mg daily and Imdur 90 mg daily  -Check BMP in 2 weeks  -Continue checking blood pressure at home, let us know if too high or too low  -Follow-up in 3 months         Relevant Medications    hydroCHLOROthiazide 12.5 MG tablet    amLODIPine (NORVASC) 5 MG tablet    Other Relevant Orders    Basic metabolic panel       Musculoskeletal and Integumentary    Lumbosacral spondylosis without myelopathy     Improved with RFA through Pain Clinic, Dr. Bundy, in 2018. Same pain flaring again, and was able to see Dr. Bundy again last week. Awaiting insurance approval of another RFA.          Skin lesion     Lesion on the back of his neck is very suspicious for BCC.  He has a dermatologist that he follows with, will send them a picture and try to move up his follow-up that is currently scheduled in September.           The longitudinal plan of care for the diagnosis(es)/condition(s) as documented above were addressed during this visit. Due to the added complexity in care, I  will continue to support Austin in the subsequent management and with ongoing continuity of care.    Ordering of each unique test  Prescription drug management     FUTURE APPOINTMENTS:       - Follow-up visit in 3 months       Subjective   Austin is a 74 year old, presenting for the following health issues:  Back Pain and Hypertension (F/U)        4/23/2024     4:51 PM   Additional Questions   Roomed by Minnie GUADARRAMA   Accompanied by Self     HPI     Austin is here for general follow up today.     HTN: At our visit in January we stopped atenolol in favor of amlodipine 5 mg daily in addition to lisinopril 40 mg daily, hydrochlorothiazide 25 mg daily and Imdur 90 mg daily.  However, shortly after, on 2/5/2024, he sent me a message saying he was experiencing dizziness, we plan to continue monitoring and keep the medications the same.  He sent a follow-up message at the end of February that the dizziness had gotten better after moving his medications to bedtime.  He sent a log of blood pressures that overall looked much improved.  Also has a log with him today for the month of April.  Blood pressure has ranged from /70-96.  Average seems to be 110s over 70s, however has had multiple instances with systolics in the upper 90s and low 100s, these do correspond to what he was feeling lightheaded and dizzy.    Obesity: weight mgmt 2/27/24, work to inc morning protein and exercise. RD 3/19/24  Wt Readings from Last 4 Encounters:   04/23/24 107.1 kg (236 lb 3.2 oz)   02/27/24 108.1 kg (238 lb 6.4 oz)   01/30/24 106.8 kg (235 lb 6.4 oz)   01/25/24 106.4 kg (234 lb 8 oz)     3460-7563 calories a day. Walking most days, 1-1.5 miles with dogs. Continues to take metformin 1500 mg daily.     Back pain: Was able to see Dr. Bundy again 4/16/24.  Plan made to repeat L1-L2 MB RFA, waiting on insurance authorization. Also did start PT.     L neck lesion: Has had a lesion on the back of his left neck for many years, initially started as a  "small pimple of some sort, over the last year has been enlarging and now seems to be open and not healing.    Review of Systems  Constitutional, neuro, ENT, endocrine, pulmonary, cardiac, gastrointestinal, genitourinary, musculoskeletal, integument and psychiatric systems are negative, except as otherwise noted.      Objective    /70   Pulse 100   Temp 97.7  F (36.5  C) (Oral)   Resp 14   Ht 1.791 m (5' 10.5\")   Wt 107.1 kg (236 lb 3.2 oz)   SpO2 95%   BMI 33.41 kg/m    Body mass index is 33.41 kg/m .  Physical Exam   GENERAL: alert and no distress  EYES: Eyes grossly normal to inspection and conjunctivae and sclerae normal  HENT: nose and mouth without ulcers or lesions  RESP: lungs clear to auscultation - no rales, rhonchi or wheezes  CV: regular rate and rhythm, normal S1 S2, no S3 or S4, no murmur, click or rub, no peripheral edema  ABDOMEN: soft, nontender  MS: no gross musculoskeletal defects noted, no edema  SKIN: +L posterior neck has ~0.5 cm open lesion with heaped edges and erythematous center   NEURO: No focal deficits, mentation intact and speech normal  PSYCH: mentation appears normal, affect normal/bright          Signed Electronically by: Yusra Pascual MD  "

## 2024-05-03 ENCOUNTER — MYC MEDICAL ADVICE (OUTPATIENT)
Dept: INTERNAL MEDICINE | Facility: CLINIC | Age: 74
End: 2024-05-03
Payer: COMMERCIAL

## 2024-05-07 ENCOUNTER — VIRTUAL VISIT (OUTPATIENT)
Dept: SURGERY | Facility: CLINIC | Age: 74
End: 2024-05-07
Payer: COMMERCIAL

## 2024-05-07 DIAGNOSIS — E66.9 OBESITY (BMI 30-39.9): Primary | ICD-10-CM

## 2024-05-07 DIAGNOSIS — Z71.3 NUTRITIONAL COUNSELING: ICD-10-CM

## 2024-05-07 PROCEDURE — 97803 MED NUTRITION INDIV SUBSEQ: CPT | Mod: 95

## 2024-05-07 NOTE — LETTER
"    5/7/2024         RE: David Sierra  [address]  [facility] [address]        Dear Colleague,    Thank you for referring your patient, David Sierra, to the [facility] SURGERY [facility] AND BARIATRICS CARE [place]. Please see a copy of my visit note below.    David Sierra is a 74 year old who is being evaluated via a billable video visit.      How would you like to obtain your AVS? MyChart  If the video visit is dropped, the invitation should be resent by: Send to e-mail at: [email or web address]  Will anyone else be joining your video visit? No     Patient was advised that Medicare may not pay for this nutrition consult today. \"If Medicare doesn't pay for services, you may have to pay. Medicare does not pay for everything, even some care that you or your health care provider have good reason to think you need. We expect that Medicare may not pay for the visit today.\"      Patient accepts visit at this time.      Medical  Weight Loss Follow-Up Diet Evaluation  Assessment:  David is presenting today for a follow up weight management nutrition consultation.  This patient has had an initial appointment and was referred by Dr. Colin for MNT as treatment for Obesity.  Weight loss medication:  Metformin .   Pt's weight is 234 lbs  Initial weight: 234.5 lbs  Weight change: no change         2/20/2024    12:45 PM   Changes and Difficulties   I have made the following changes to my diet since my last visit: Added Protein.  Increased Fluids   With regards to my diet, I am still struggling with: Weight is plateaued   I have made the following changes to my activity/exercise since my last visit: As weather has warmed, walking has increased.  Reach more than my  7,000 step goal most days.  Hope to reach 10,00 steps / day by early April.   With regards to my activity/exercise, I am still struggling with: I am working without a  fitness routine. Simply access to the equipment  isn't sufficient. "     BMI: There is no height or weight on file to calculate BMI.  Ideal body weight: 74.1 kg (163 lb 7.5 oz)  Adjusted ideal body weight: 87.7 kg (193 lb 7.1 oz)    Estimated RMR ([place]-St Jeor equation):   1670 kcals x 1.2 (sedentary) = 2000 kcals (for weight maintenance)  Recommended Protein Intake: 70-90 grams of protein/day  Patient Active Problem List:  Patient Active Problem List   Diagnosis     Adenomatous polyp of colon     Essential hypertension     Impaired fasting blood sugar     Encounter for Medicare annual wellness exam     Class 1 obesity due to excess calories with serious comorbidity and body mass index (BMI) of 33.0 to 33.9 in adult     Lower urinary tract symptoms (LUTS)     Decreased hearing of both ears     Seborrheic dermatitis     Lumbosacral spondylosis without myelopathy     Skin lesion       Progress on goals from last visit: Patient reports he has been journaling is intake, eating about 1,400 kcal per day. Going to the [facility] and walking. Weight has maintained.   - getting plenty of protein in diet  - take about 20 minutes to eat a meal   - utilizing the 10/10 rule   - currently interval eating every 2 hours       Dietary Recall:   intolerant to Shrimp and Potatoes    Breakfast: Cereal with [place] protein   Lunch: [place] with yogurt or cottage cheese OR chicken soup with vegetables   Dinner: Protein (chicken breast ~7oz), (Beef 4-5oz) / Cooked vegetable / Fresh vegetables (salad) w\dressing    Snack: protein shakes (Premiere) Or energy bars ([place] cake granola bar Or june protein bar)   Beverages:   [place] milk   diet soda on occasion,   Water with catracho or plain, (80-100oz)  Exercise: Walking the dog around the neighborhood or at the community center (averaging 7,500k steps per day)     Nutrition Diagnosis:    Obesity related to excessive energy intake as evidence by patient subjective reports and BMI of 33.17          Intervention:  Food and/or nutrient delivery:  discussed calorie goals, protein goals and activity goals.   Nutrition education: none  Nutrition counseling: continued support and goal setting     Monitoring/Evaluation:    Goals:  Avoid snacking - stick to 3 meals and the protein shake in mid morning   Consistency with meals     Patient to follow up in 2.5 month(s) with bariatrician and 2 month(s) with RD        Video-Visit Details    Type of service:  Video Visit    Video Start Time (time video started): 8:35 AM    Video End Time (time video stopped): 9:04 AM    Originating Location (pt. Location): Home      Distant Location (provider location):  Off-site    Mode of Communication:  Video Conference via [facility]    Physician has received verbal consent for a Video Visit from the patient? Yes      Carrol Arellano RD           Again, thank you for allowing me to participate in the care of your patient.        Sincerely,        Carrol Arellano RD

## 2024-05-07 NOTE — PROGRESS NOTES
"David Sierra is a 74 year old who is being evaluated via a billable video visit.      How would you like to obtain your AVS? MyChart  If the video visit is dropped, the invitation should be resent by: Send to e-mail at: [email or web address]  Will anyone else be joining your video visit? No     Patient was advised that Medicare may not pay for this nutrition consult today. \"If Medicare doesn't pay for services, you may have to pay. Medicare does not pay for everything, even some care that you or your health care provider have good reason to think you need. We expect that Medicare may not pay for the visit today.\"      Patient accepts visit at this time.      Medical  Weight Loss Follow-Up Diet Evaluation  Assessment:  David is presenting today for a follow up weight management nutrition consultation.  This patient has had an initial appointment and was referred by Dr. Colin for MNT as treatment for Obesity.  Weight loss medication:  Metformin .   Pt's weight is 234 lbs  Initial weight: 234.5 lbs  Weight change: no change         2/20/2024    12:45 PM   Changes and Difficulties   I have made the following changes to my diet since my last visit: Added Protein.  Increased Fluids   With regards to my diet, I am still struggling with: Weight is plateaued   I have made the following changes to my activity/exercise since my last visit: As weather has warmed, walking has increased.  Reach more than my  7,000 step goal most days.  Hope to reach 10,00 steps / day by early April.   With regards to my activity/exercise, I am still struggling with: I am working without a  fitness routine. Simply access to the equipment  isn't sufficient.     BMI: There is no height or weight on file to calculate BMI.  Ideal body weight: 74.1 kg (163 lb 7.5 oz)  Adjusted ideal body weight: 87.7 kg (193 lb 7.1 oz)    Estimated RMR ([place]-St Jeor equation):   1670 kcals x 1.2 (sedentary) = 2000 kcals (for weight maintenance)  Recommended " Protein Intake: 70-90 grams of protein/day  Patient Active Problem List:  Patient Active Problem List   Diagnosis    Adenomatous polyp of colon    Essential hypertension    Impaired fasting blood sugar    Encounter for Medicare annual wellness exam    Class 1 obesity due to excess calories with serious comorbidity and body mass index (BMI) of 33.0 to 33.9 in adult    Lower urinary tract symptoms (LUTS)    Decreased hearing of both ears    Seborrheic dermatitis    Lumbosacral spondylosis without myelopathy    Skin lesion       Progress on goals from last visit: Patient reports he has been journaling is intake, eating about 1,400 kcal per day. Going to the [facility] and walking. Weight has maintained.   - getting plenty of protein in diet  - take about 20 minutes to eat a meal   - utilizing the 10/10 rule   - currently interval eating every 2 hours       Dietary Recall:   intolerant to Shrimp and Potatoes    Breakfast: Cereal with [place] protein   Lunch: [place] with yogurt or cottage cheese OR chicken soup with vegetables   Dinner: Protein (chicken breast ~7oz), (Beef 4-5oz) / Cooked vegetable / Fresh vegetables (salad) w\dressing    Snack: protein shakes (Premiere) Or energy bars ([place] cake granola bar Or june protein bar)   Beverages:   [place] milk   diet soda on occasion,   Water with catracho or plain, (80-100oz)  Exercise: Walking the dog around the neighborhood or at the [facility] (averaging 7,500k steps per day)     Nutrition Diagnosis:    Obesity related to excessive energy intake as evidence by patient subjective reports and BMI of 33.17          Intervention:  Food and/or nutrient delivery: discussed calorie goals, protein goals and activity goals.   Nutrition education: none  Nutrition counseling: continued support and goal setting     Monitoring/Evaluation:    Goals:  Avoid snacking - stick to 3 meals and the protein shake in mid morning   Consistency with meals     Patient to follow up  in 2.5 month(s) with bariatrician and 2 month(s) with RD        Video-Visit Details    Type of service:  Video Visit    Video Start Time (time video started): 8:35 AM    Video End Time (time video stopped): 9:04 AM    Originating Location (pt. Location): Home      Distant Location (provider location):  Off-site    Mode of Communication:  Video Conference via [facility]    Physician has received verbal consent for a Video Visit from the patient? Yes      Carrol Arellano RD

## 2024-05-13 ENCOUNTER — LAB (OUTPATIENT)
Dept: LAB | Facility: CLINIC | Age: 74
End: 2024-05-13
Payer: COMMERCIAL

## 2024-05-13 DIAGNOSIS — I10 ESSENTIAL HYPERTENSION: ICD-10-CM

## 2024-05-13 LAB
ANION GAP SERPL CALCULATED.3IONS-SCNC: 9 MMOL/L (ref 7–15)
BUN SERPL-MCNC: 16 MG/DL (ref 8–23)
CALCIUM SERPL-MCNC: 9.1 MG/DL (ref 8.8–10.2)
CHLORIDE SERPL-SCNC: 103 MMOL/L (ref 98–107)
CREAT SERPL-MCNC: 0.75 MG/DL (ref 0.67–1.17)
DEPRECATED HCO3 PLAS-SCNC: 25 MMOL/L (ref 22–29)
EGFRCR SERPLBLD CKD-EPI 2021: >90 ML/MIN/1.73M2
GLUCOSE SERPL-MCNC: 99 MG/DL (ref 70–99)
POTASSIUM SERPL-SCNC: 4.3 MMOL/L (ref 3.4–5.3)
SODIUM SERPL-SCNC: 137 MMOL/L (ref 135–145)

## 2024-05-13 PROCEDURE — 80048 BASIC METABOLIC PNL TOTAL CA: CPT

## 2024-05-13 PROCEDURE — 36415 COLL VENOUS BLD VENIPUNCTURE: CPT

## 2024-05-16 ENCOUNTER — MYC MEDICAL ADVICE (OUTPATIENT)
Dept: INTERNAL MEDICINE | Facility: CLINIC | Age: 74
End: 2024-05-16
Payer: COMMERCIAL

## 2024-05-16 DIAGNOSIS — I10 ESSENTIAL HYPERTENSION: ICD-10-CM

## 2024-05-16 NOTE — TELEPHONE ENCOUNTER
Writer is unsure if PCP got the note or not there is no documentation in chart from the  regarding anything being dropped off....    Routed to PCP to review and advise and to RN's as well since BP is high

## 2024-05-17 RX ORDER — AMLODIPINE BESYLATE 5 MG/1
7.5 TABLET ORAL DAILY
Qty: 135 TABLET | Refills: 1 | Status: SHIPPED | OUTPATIENT
Start: 2024-05-17

## 2024-05-29 DIAGNOSIS — R39.9 LOWER URINARY TRACT SYMPTOMS (LUTS): ICD-10-CM

## 2024-05-29 NOTE — TELEPHONE ENCOUNTER
Patient is requesting a refill of this medication. Please respond accordingly.    Pending Prescriptions:                       Disp   Refills    tamsulosin (FLOMAX) 0.4 MG capsule        180 ca*1            Sig: Take 2 capsules (0.8 mg) by mouth daily    Thank you so much for your help!  Geraldine Luu, [facility] Float [facility]

## 2024-05-30 RX ORDER — TAMSULOSIN HYDROCHLORIDE 0.4 MG/1
0.8 CAPSULE ORAL DAILY
Qty: 180 CAPSULE | Refills: 1 | OUTPATIENT
Start: 2024-05-30

## 2024-06-13 ENCOUNTER — OFFICE VISIT (OUTPATIENT)
Dept: SURGERY | Facility: CLINIC | Age: 74
End: 2024-06-13
Payer: COMMERCIAL

## 2024-06-13 VITALS
BODY MASS INDEX: 33.14 KG/M2 | SYSTOLIC BLOOD PRESSURE: 126 MMHG | WEIGHT: 236.7 LBS | HEIGHT: 71 IN | DIASTOLIC BLOOD PRESSURE: 82 MMHG

## 2024-06-13 DIAGNOSIS — E66.09 CLASS 1 OBESITY DUE TO EXCESS CALORIES WITH SERIOUS COMORBIDITY AND BODY MASS INDEX (BMI) OF 33.0 TO 33.9 IN ADULT: Primary | ICD-10-CM

## 2024-06-13 DIAGNOSIS — E66.811 CLASS 1 OBESITY DUE TO EXCESS CALORIES WITH SERIOUS COMORBIDITY AND BODY MASS INDEX (BMI) OF 33.0 TO 33.9 IN ADULT: Primary | ICD-10-CM

## 2024-06-13 DIAGNOSIS — I10 ESSENTIAL HYPERTENSION: ICD-10-CM

## 2024-06-13 PROCEDURE — 99214 OFFICE O/P EST MOD 30 MIN: CPT | Performed by: FAMILY MEDICINE

## 2024-06-13 NOTE — LETTER
6/13/2024      David Sierra  [address] [place] [facility] [address]      Dear Colleague,    Thank you for referring your patient, David Sierra, to the [facility] SURGERY CLINIC AND BARIATRICS CARE [place]. Please see a copy of my visit note below.    Bariatric Care Clinic Non Surgical Follow up Visit   Date of visit: 6/13/2024  Physician: MARÍA Colin MD, MD  Primary Care is Yusra Pascual.  David Sierra   74 year old  male     Initial Weight: 234#  Initial BMI: 33.17  Today's Weight:   Wt Readings from Last 1 Encounters:   06/13/24 107.4 kg (236 lb 11.2 oz)     Body mass index is 33.48 kg/m .           Assessment and Plan   Assessment: David is a 74 year old year old male who presents for medical weight management.      Plan:    1. Class 1 obesity due to excess calories with serious comorbidity and body mass index (BMI) of 33.0 to 33.9 in adult  Patient was congratulated on the healthy changes he has made thus far. Healthy habits to assist with further weight loss were discussed. He will try to increase his protein at meals and decrease his starches. He will exercise as tolerated. He will continue the metformin. I suspect he has replaced some fat with muscle and that is why we haven't seen more of a weight change. We discussed the patient's co-morbid conditions including hypertension. This likely will improve with healthy habits and weight loss.     2. Essential hypertension  This may improve with healthy habits and weight loss.      Follow up in 4 months with myself           INTERIM HISTORY  Patient tried increasing his metformin to 3 tablets per day after his last visit in February. He thinks it helps decrease his appetie after dinner. He is struggling with back and leg pain.     Goals set with dietician 5/7/24:  Avoid snacking - stick to 3 meals and the protein shake in mid morning - not met. He feels that he needs 2 snacksor he gets too lethargic  Consistency with  meals     DIETARY HISTORY  Meals Per Day: 3  Eating Protein First?: sometimes  Food Diary: B:cheerios 1/2 cup and peanut butter kashi and almond milk and protein  powder L:omlete with 2 eggs and cheese or sandwich on slice of dago bread and lunch meat (2 oz) and cheese and pretzels and fruit D:protein and vegetable, sometimes starch  Snacks Per Day: 2  Typical Snack:1/2  protein bar or protein shake  Fluid Intake: 80 oz per day, sometimes with catracho  Portion Control: yes  Calorie Containing Beverages: none  Meals at Restaurant per week:2-3, tries to make healthy choices    Positive Changes Since Last Visit: walking, journaling intake eating 1,400 samantha per day  Struggling With: back and leg pain    Knowledgeable in Reading Food Labels: yes  Getting Adequate Protein: not at each meal but yes when adding protein snacks    PHYSICAL ACTIVITY PATTERNS:  Walking in the pool and swimming 2-3 days a week, weight machines 3-4 days per week    REVIEW OF SYSTEMS  GENERAL/CONSTITUTIONAL:  Fatigue: yes  HEENT:   glaucoma: no  CARDIOVASCULAR:  History of heart disease: no  GI:  Pancreatitis: no  NEUROLOGIC:  Paresthesias: yes  History of migraine headaches: history of  MUSCULOSKELETAL/RHEUMATOLOGIC  Arthralgias: yes  Myalgias: yes  ENDOCRINE:  Monitoring Blood Sugars: no  Sugars Well Controlled: na  :  Birth control: male  History of kidney stones: no     Patient Profile   Social History     Social History Narrative     Not on file        Past Medical History   Past Medical History:   Diagnosis Date     Arthritis Not sure    treated with Aspirins     Cancer (H) 2016    Skin Cancers; treated with Surgeries (3); involved back & Scalp     Hypertension 1995    Dr Lilian Gonzales (HP / Retired) diagnosed the High BP     Patient Active Problem List   Diagnosis     Adenomatous polyp of colon     Essential hypertension     Impaired fasting blood sugar     Encounter for Medicare annual wellness exam     Class 1 obesity due to excess  "calories with serious comorbidity and body mass index (BMI) of 33.0 to 33.9 in adult     Lower urinary tract symptoms (LUTS)     Decreased hearing of both ears     Seborrheic dermatitis     Lumbosacral spondylosis without myelopathy     Skin lesion       Past Surgical History  He has a past surgical history that includes orthopedic surgery (2016).     Examination   /82   Ht 1.791 m (5' 10.5\")   Wt 107.4 kg (236 lb 11.2 oz)   BMI 33.48 kg/m    Wt Readings from Last 4 Encounters:   06/13/24 107.4 kg (236 lb 11.2 oz)   04/23/24 107.1 kg (236 lb 3.2 oz)   02/27/24 108.1 kg (238 lb 6.4 oz)   01/30/24 106.8 kg (235 lb 6.4 oz)      BP Readings from Last 3 Encounters:   06/13/24 126/82   04/23/24 124/70   02/27/24 128/84      GEN: Alert and oriented in no acute distress.   ABDOMEN: mild protuberance        Counseling:   We reviewed the important post op bariatric recommendations:  -eating 3 meals daily  -eating protein first, getting >60gm protein daily  -eating slowly, chewing food well  -avoiding/limiting calorie containing beverages  -limiting starchy vegetables and carbohydrates, choosing wheat, not white with breads,   crackers, pastas, ad, bagels, tortillas, rice  -limiting restaurant or cafeteria eating to twice a week or less    We discussed the importance of restorative sleep and stress management in maintaining a healthy weight.  We discussed the National Weight Control Registry healthy weight maintenance strategies and ways to optimize metabolism.  We discussed the importance of physical activity including cardiovascular and strength training in maintaining a healthier weight.    Total time spent on the date of this encounter doing: chart review, review of test results, patient visit, physical exam, education, counseling, developing plan of care and documenting = 32 minutes.         MARÍA Colin MD  [facility]               Again, thank you for allowing me to participate in " the care of your patient.        Sincerely,        MARÍA Colin MD

## 2024-06-13 NOTE — PROGRESS NOTES
[facility] Non Surgical Follow up Visit   Date of visit: 6/13/2024  Physician: MARÍA Colin MD, MD  Primary Care is Yusra Pascual.  David Sierra   74 year old  male     Initial Weight: 234#  Initial BMI: 33.17  Today's Weight:   Wt Readings from Last 1 Encounters:   06/13/24 107.4 kg (236 lb 11.2 oz)     Body mass index is 33.48 kg/m .           Assessment and Plan   Assessment: David is a 74 year old year old male who presents for medical weight management.      Plan:    1. Class 1 obesity due to excess calories with serious comorbidity and body mass index (BMI) of 33.0 to 33.9 in adult  Patient was congratulated on the healthy changes he has made thus far. Healthy habits to assist with further weight loss were discussed. He will try to increase his protein at meals and decrease his starches. He will exercise as tolerated. He will continue the metformin. I suspect he has replaced some fat with muscle and that is why we haven't seen more of a weight change. We discussed the patient's co-morbid conditions including hypertension. This likely will improve with healthy habits and weight loss.     2. Essential hypertension  This may improve with healthy habits and weight loss.      Follow up in 4 months with myself           INTERIM HISTORY  Patient tried increasing his metformin to 3 tablets per day after his last visit in February. He thinks it helps decrease his appetie after dinner. He is struggling with back and leg pain.     Goals set with dietician 5/7/24:  Avoid snacking - stick to 3 meals and the protein shake in mid morning - not met. He feels that he needs 2 snacksor he gets too lethargic  Consistency with meals     DIETARY HISTORY  Meals Per Day: 3  Eating Protein First?: sometimes  Food Diary: B:cheerios 1/2 cup and peanut butter kashi and almond milk and protein  powder L:omlete with 2 eggs and cheese or sandwich on slice of dago bread and lunch meat (2 oz) and cheese and  pretzels and fruit D:protein and vegetable, sometimes starch  Snacks Per Day: 2  Typical Snack:1/2  protein bar or protein shake  Fluid Intake: 80 oz per day, sometimes with catracho  Portion Control: yes  Calorie Containing Beverages: none  Meals at Restaurant per week:2-3, tries to make healthy choices    Positive Changes Since Last Visit: walking, journaling intake eating 1,400 samantha per day  Struggling With: back and leg pain    Knowledgeable in Reading Food Labels: yes  Getting Adequate Protein: not at each meal but yes when adding protein snacks    PHYSICAL ACTIVITY PATTERNS:  Walking in the pool and swimming 2-3 days a week, weight machines 3-4 days per week    REVIEW OF SYSTEMS  GENERAL/CONSTITUTIONAL:  Fatigue: yes  HEENT:   glaucoma: no  CARDIOVASCULAR:  History of heart disease: no  GI:  Pancreatitis: no  NEUROLOGIC:  Paresthesias: yes  History of migraine headaches: history of  MUSCULOSKELETAL/RHEUMATOLOGIC  Arthralgias: yes  Myalgias: yes  ENDOCRINE:  Monitoring Blood Sugars: no  Sugars Well Controlled: na  :  Birth control: male  History of kidney stones: no     Patient Profile   Social History     Social History Narrative    Not on file        Past Medical History   Past Medical History:   Diagnosis Date    Arthritis Not sure    treated with Aspirins    Cancer (H) 2016    Skin Cancers; treated with Surgeries (3); involved back & Scalp    Hypertension 1995    Dr Lilian Gonzales (HP / Retired) diagnosed the High BP     Patient Active Problem List   Diagnosis    Adenomatous polyp of colon    Essential hypertension    Impaired fasting blood sugar    Encounter for Medicare annual wellness exam    Class 1 obesity due to excess calories with serious comorbidity and body mass index (BMI) of 33.0 to 33.9 in adult    Lower urinary tract symptoms (LUTS)    Decreased hearing of both ears    Seborrheic dermatitis    Lumbosacral spondylosis without myelopathy    Skin lesion       Past Surgical History  He has a past  "surgical history that includes orthopedic surgery (2016).     Examination   /82   Ht 1.791 m (5' 10.5\")   Wt 107.4 kg (236 lb 11.2 oz)   BMI 33.48 kg/m    Wt Readings from Last 4 Encounters:   06/13/24 107.4 kg (236 lb 11.2 oz)   04/23/24 107.1 kg (236 lb 3.2 oz)   02/27/24 108.1 kg (238 lb 6.4 oz)   01/30/24 106.8 kg (235 lb 6.4 oz)      BP Readings from Last 3 Encounters:   06/13/24 126/82   04/23/24 124/70   02/27/24 128/84      GEN: Alert and oriented in no acute distress.   ABDOMEN: mild protuberance        Counseling:   We reviewed the important post op bariatric recommendations:  -eating 3 meals daily  -eating protein first, getting >60gm protein daily  -eating slowly, chewing food well  -avoiding/limiting calorie containing beverages  -limiting starchy vegetables and carbohydrates, choosing wheat, not white with breads,   crackers, pastas, ad, bagels, tortillas, rice  -limiting restaurant or cafeteria eating to twice a week or less    We discussed the importance of restorative sleep and stress management in maintaining a healthy weight.  We discussed the National Weight Control Registry healthy weight maintenance strategies and ways to optimize metabolism.  We discussed the importance of physical activity including cardiovascular and strength training in maintaining a healthier weight.    Total time spent on the date of this encounter doing: chart review, review of test results, patient visit, physical exam, education, counseling, developing plan of care and documenting = 32 minutes.         MARÍA Colin MD  [facility]           "

## 2024-06-17 ENCOUNTER — TELEPHONE (OUTPATIENT)
Dept: INTERNAL MEDICINE | Facility: CLINIC | Age: 74
End: 2024-06-17
Payer: COMMERCIAL

## 2024-06-17 DIAGNOSIS — R39.9 LOWER URINARY TRACT SYMPTOMS (LUTS): ICD-10-CM

## 2024-06-17 RX ORDER — TAMSULOSIN HYDROCHLORIDE 0.4 MG/1
0.8 CAPSULE ORAL DAILY
Qty: 180 CAPSULE | Refills: 1 | Status: SHIPPED | OUTPATIENT
Start: 2024-06-17

## 2024-06-17 NOTE — TELEPHONE ENCOUNTER
This patient would like a refill of Tamsulosin.    Thank you,  [facility] Pharmacy[facility]echelissa Matos

## 2024-07-09 ENCOUNTER — VIRTUAL VISIT (OUTPATIENT)
Dept: SURGERY | Facility: CLINIC | Age: 74
End: 2024-07-09
Payer: COMMERCIAL

## 2024-07-09 DIAGNOSIS — E66.9 OBESITY (BMI 30-39.9): Primary | ICD-10-CM

## 2024-07-09 DIAGNOSIS — Z71.3 NUTRITIONAL COUNSELING: ICD-10-CM

## 2024-07-09 PROCEDURE — 97803 MED NUTRITION INDIV SUBSEQ: CPT | Mod: 95

## 2024-07-09 NOTE — LETTER
"7/9/2024      David Sierra  [address]  [facility] [address]      Dear Colleague,    Thank you for referring your patient, David Sierra, to the [facility]. Please see a copy of my visit note below.    David Sierra is a 74 year old who is being evaluated via a billable video visit.      How would you like to obtain your AVS? MyChart  If the video visit is dropped, the invitation should be resent by: Send to e-mail at: [email or web address]  Will anyone else be joining your video visit? No       Patient was advised that Medicare may not pay for this nutrition consult today. \"If Medicare doesn't pay for services, you may have to pay. Medicare does not pay for everything, even some care that you or your health care provider have good reason to think you need. We expect that Medicare may not pay for the visit today.\"      Patient accepts visit at this time.      Medical  Weight Loss Follow-Up Diet Evaluation  Assessment:  David is presenting today for a follow up weight management nutrition consultation.  This patient has had an initial appointment and was referred by Dr. Colin for MNT as treatment for Obesity.  Weight loss medication:  Metformin .   Pt's weight is 233 lbs  Initial weight: 234.5 lbs  Weight change: 3 lbs down from visit with Dr. Layton in June 2/20/2024    12:45 PM   Changes and Difficulties   I have made the following changes to my diet since my last visit: Added Protein.  Increased Fluids   With regards to my diet, I am still struggling with: Weight is plateaued   I have made the following changes to my activity/exercise since my last visit: As weather has warmed, walking has increased.  Reach more than my  7,000 step goal most days.  Hope to reach 10,00 steps / day by early April.   With regards to my activity/exercise, I am still struggling with: I am working without a  fitness routine. Simply access to the " equipment  isn't sufficient.     BMI: There is no height or weight on file to calculate BMI.  Ideal body weight: 74.1 kg (163 lb 7.5 oz)  Adjusted ideal body weight: 87.7 kg (193 lb 7.1 oz)    Estimated RMR ([place]-St Jeor equation):   1670 kcals x 1.2 (sedentary) = 2000 kcals (for weight maintenance)  Recommended Protein Intake: 70-90 grams of protein/day  Patient Active Problem List:  Patient Active Problem List   Diagnosis     Adenomatous polyp of colon     Essential hypertension     Impaired fasting blood sugar     Encounter for Medicare annual wellness exam     Class 1 obesity due to excess calories with serious comorbidity and body mass index (BMI) of 33.0 to 33.9 in adult     Lower urinary tract symptoms (LUTS)     Decreased hearing of both ears     Seborrheic dermatitis     Lumbosacral spondylosis without myelopathy     Skin lesion       Progress on goals from last visit: Patient reports he started tracking his intake and hitting 1400 kcal but not reaching his protein needs (less than 20g per day). Reports he has increased protein with adding protein powder to his diet and also limiting his bread intake. Physical activity has increased - working with a . Is down 3 lbs int he last 3 weeks since his visit with Dr. Colin.  - started taking a probiotic      Avoid snacking - stick to 3 meals and the protein shake in mid morning - not met   Consistency with meals - met     Dietary Recall:   intolerant to Shrimp and Potatoes    Breakfast: Protein shake (almond milk with protein powder)  Lunch: scrambled x2 eggs with cheese   Dinner: Protein (chicken breast ~7oz), peas and side salad  Snack: iwoca protein shake Or energy bars ([place] cake granola bar Or june protein bar), fruit   Beverages:   [place] milk   diet soda on occasion,   Water with catracho or plain, (80-100oz)  Exercise: Walking the dog around the neighborhood. Working with a  at [employer]     Nutrition  Diagnosis:    Obesity related to excessive energy intake as evidence by patient subjective reports and BMI of 33.17          Intervention:  Food and/or nutrient delivery: discussed calorie goals, protein goals and activity goals.   Nutrition education: none  Nutrition counseling: continued support and goal setting     Monitoring/Evaluation:    Goals:  Pair protein with fiber source at breakfast and lunch time  Continue with current goals set   Continue with protein goals established (70-90g per day)    Patient to follow up in 4 month(s) with bariatrician and 4 month(s) with RD        Video-Visit Details    Type of service:  Video Visit    Video Start Time (time video started): 11:01 AM    Video End Time (time video stopped): 11:23 AM    Originating Location (pt. Location): Home      Distant Location (provider location):  Off-site    Mode of Communication:  Video Conference via [facility]    Physician has received verbal consent for a Video Visit from the patient? Yes      Carrol Arellano RD           Again, thank you for allowing me to participate in the care of your patient.        Sincerely,        Carrol Schwartz RD

## 2024-07-09 NOTE — PROGRESS NOTES
"David Sierra is a 74 year old who is being evaluated via a billable video visit.      How would you like to obtain your AVS? MyChart  If the video visit is dropped, the invitation should be resent by: Send to e-mail at: [email or web address]  Will anyone else be joining your video visit? No       Patient was advised that Medicare may not pay for this nutrition consult today. \"If Medicare doesn't pay for services, you may have to pay. Medicare does not pay for everything, even some care that you or your health care provider have good reason to think you need. We expect that Medicare may not pay for the visit today.\"      Patient accepts visit at this time.      Medical  Weight Loss Follow-Up Diet Evaluation  Assessment:  David is presenting today for a follow up weight management nutrition consultation.  This patient has had an initial appointment and was referred by Dr. Colin for MNT as treatment for Obesity.  Weight loss medication:  Metformin .   Pt's weight is 233 lbs  Initial weight: 234.5 lbs  Weight change: 3 lbs down from visit with Dr. Layton in June 2/20/2024    12:45 PM   Changes and Difficulties   I have made the following changes to my diet since my last visit: Added Protein.  Increased Fluids   With regards to my diet, I am still struggling with: Weight is plateaued   I have made the following changes to my activity/exercise since my last visit: As weather has warmed, walking has increased.  Reach more than my  7,000 step goal most days.  Hope to reach 10,00 steps / day by early April.   With regards to my activity/exercise, I am still struggling with: I am working without a  fitness routine. Simply access to the equipment  isn't sufficient.     BMI: There is no height or weight on file to calculate BMI.  Ideal body weight: 74.1 kg (163 lb 7.5 oz)  Adjusted ideal body weight: 87.7 kg (193 lb 7.1 oz)    Estimated RMR ([place]-St Jeor equation):   1670 kcals x 1.2 (sedentary) = 2000 kcals " (for weight maintenance)  Recommended Protein Intake: 70-90 grams of protein/day  Patient Active Problem List:  Patient Active Problem List   Diagnosis    Adenomatous polyp of colon    Essential hypertension    Impaired fasting blood sugar    Encounter for Medicare annual wellness exam    Class 1 obesity due to excess calories with serious comorbidity and body mass index (BMI) of 33.0 to 33.9 in adult    Lower urinary tract symptoms (LUTS)    Decreased hearing of both ears    Seborrheic dermatitis    Lumbosacral spondylosis without myelopathy    Skin lesion       Progress on goals from last visit: Patient reports he started tracking his intake and hitting 1400 kcal but not reaching his protein needs (less than 20g per day). Reports he has increased protein with adding protein powder to his diet and also limiting his bread intake. Physical activity has increased - working with a . Is down 3 lbs int he last 3 weeks since his visit with Dr. Colin.  - started taking a probiotic      Avoid snacking - stick to 3 meals and the protein shake in mid morning - not met   Consistency with meals - met     Dietary Recall:   intolerant to Shrimp and Potatoes    Breakfast: Protein shake (almond milk with protein powder)  Lunch: scrambled x2 eggs with cheese   Dinner: Protein (chicken breast ~7oz), peas and side salad  Snack: [employer] protein shake Or energy bars ([place] cake granola bar Or june protein bar), fruit   Beverages:   [place] milk   diet soda on occasion,   Water with catracho or plain, (80-100oz)  Exercise: Walking the dog around the neighborhood. Working with a  at [employer]     Nutrition Diagnosis:    Obesity related to excessive energy intake as evidence by patient subjective reports and BMI of 33.17          Intervention:  Food and/or nutrient delivery: discussed calorie goals, protein goals and activity goals.   Nutrition education: none  Nutrition counseling:  continued support and goal setting     Monitoring/Evaluation:    Goals:  Pair protein with fiber source at breakfast and lunch time  Continue with current goals set   Continue with protein goals established (70-90g per day)    Patient to follow up in 4 month(s) with bariatrician and 4 month(s) with RD        Video-Visit Details    Type of service:  Video Visit    Video Start Time (time video started): 11:01 AM    Video End Time (time video stopped): 11:23 AM    Originating Location (pt. Location): Home      Distant Location (provider location):  Off-site    Mode of Communication:  Video Conference via [facility]    Physician has received verbal consent for a Video Visit from the patient? Yes      Carrol Arellano RD

## 2024-07-29 ENCOUNTER — E-VISIT (OUTPATIENT)
Dept: INTERNAL MEDICINE | Facility: CLINIC | Age: 74
End: 2024-07-29
Payer: COMMERCIAL

## 2024-07-29 ENCOUNTER — TELEPHONE (OUTPATIENT)
Dept: INTERNAL MEDICINE | Facility: CLINIC | Age: 74
End: 2024-07-29

## 2024-07-29 DIAGNOSIS — M47.817 LUMBOSACRAL SPONDYLOSIS WITHOUT MYELOPATHY: Primary | ICD-10-CM

## 2024-07-29 PROCEDURE — 99207 PR NON-BILLABLE SERV PER CHARTING: CPT | Performed by: INTERNAL MEDICINE

## 2024-07-29 NOTE — TELEPHONE ENCOUNTER
Patient submitted e-visit today. Needs pre-op prior to procedure 8/15. Please schedule with blocked slots. Let me know if none available - would need to see alternate provider then.

## 2024-08-05 ENCOUNTER — OFFICE VISIT (OUTPATIENT)
Dept: INTERNAL MEDICINE | Facility: CLINIC | Age: 74
End: 2024-08-05
Payer: COMMERCIAL

## 2024-08-05 VITALS
TEMPERATURE: 97.7 F | HEIGHT: 71 IN | HEART RATE: 80 BPM | WEIGHT: 232.4 LBS | BODY MASS INDEX: 32.53 KG/M2 | RESPIRATION RATE: 17 BRPM | SYSTOLIC BLOOD PRESSURE: 124 MMHG | DIASTOLIC BLOOD PRESSURE: 70 MMHG | OXYGEN SATURATION: 97 %

## 2024-08-05 DIAGNOSIS — I44.7 LBBB (LEFT BUNDLE BRANCH BLOCK): ICD-10-CM

## 2024-08-05 DIAGNOSIS — M47.817 LUMBOSACRAL SPONDYLOSIS WITHOUT MYELOPATHY: ICD-10-CM

## 2024-08-05 DIAGNOSIS — I10 ESSENTIAL HYPERTENSION: ICD-10-CM

## 2024-08-05 DIAGNOSIS — Z01.818 PREOP GENERAL PHYSICAL EXAM: Primary | ICD-10-CM

## 2024-08-05 DIAGNOSIS — R39.9 LOWER URINARY TRACT SYMPTOMS (LUTS): ICD-10-CM

## 2024-08-05 DIAGNOSIS — E66.09 CLASS 1 OBESITY DUE TO EXCESS CALORIES WITH SERIOUS COMORBIDITY AND BODY MASS INDEX (BMI) OF 33.0 TO 33.9 IN ADULT: ICD-10-CM

## 2024-08-05 DIAGNOSIS — E66.811 CLASS 1 OBESITY DUE TO EXCESS CALORIES WITH SERIOUS COMORBIDITY AND BODY MASS INDEX (BMI) OF 33.0 TO 33.9 IN ADULT: ICD-10-CM

## 2024-08-05 LAB
ANION GAP SERPL CALCULATED.3IONS-SCNC: 9 MMOL/L (ref 7–15)
ATRIAL RATE - MUSE: 82 BPM
BUN SERPL-MCNC: 16.2 MG/DL (ref 8–23)
CALCIUM SERPL-MCNC: 9.1 MG/DL (ref 8.8–10.4)
CHLORIDE SERPL-SCNC: 98 MMOL/L (ref 98–107)
CREAT SERPL-MCNC: 0.76 MG/DL (ref 0.67–1.17)
DIASTOLIC BLOOD PRESSURE - MUSE: NORMAL MMHG
EGFRCR SERPLBLD CKD-EPI 2021: >90 ML/MIN/1.73M2
ERYTHROCYTE [DISTWIDTH] IN BLOOD BY AUTOMATED COUNT: 11.8 % (ref 10–15)
GLUCOSE SERPL-MCNC: 97 MG/DL (ref 70–99)
HCO3 SERPL-SCNC: 27 MMOL/L (ref 22–29)
HCT VFR BLD AUTO: 41.6 % (ref 40–53)
HGB BLD-MCNC: 14.2 G/DL (ref 13.3–17.7)
INTERPRETATION ECG - MUSE: NORMAL
MCH RBC QN AUTO: 31.3 PG (ref 26.5–33)
MCHC RBC AUTO-ENTMCNC: 34.1 G/DL (ref 31.5–36.5)
MCV RBC AUTO: 92 FL (ref 78–100)
P AXIS - MUSE: 72 DEGREES
PLATELET # BLD AUTO: 205 10E3/UL (ref 150–450)
POTASSIUM SERPL-SCNC: 4.2 MMOL/L (ref 3.4–5.3)
PR INTERVAL - MUSE: 170 MS
QRS DURATION - MUSE: 164 MS
QT - MUSE: 428 MS
QTC - MUSE: 500 MS
R AXIS - MUSE: -1 DEGREES
RBC # BLD AUTO: 4.53 10E6/UL (ref 4.4–5.9)
SODIUM SERPL-SCNC: 134 MMOL/L (ref 135–145)
SYSTOLIC BLOOD PRESSURE - MUSE: NORMAL MMHG
T AXIS - MUSE: 109 DEGREES
VENTRICULAR RATE- MUSE: 82 BPM
WBC # BLD AUTO: 5.3 10E3/UL (ref 4–11)

## 2024-08-05 PROCEDURE — 93010 ELECTROCARDIOGRAM REPORT: CPT | Performed by: INTERNAL MEDICINE

## 2024-08-05 PROCEDURE — 85027 COMPLETE CBC AUTOMATED: CPT | Performed by: INTERNAL MEDICINE

## 2024-08-05 PROCEDURE — 99214 OFFICE O/P EST MOD 30 MIN: CPT | Performed by: INTERNAL MEDICINE

## 2024-08-05 PROCEDURE — 36415 COLL VENOUS BLD VENIPUNCTURE: CPT | Performed by: INTERNAL MEDICINE

## 2024-08-05 PROCEDURE — 93005 ELECTROCARDIOGRAM TRACING: CPT | Performed by: INTERNAL MEDICINE

## 2024-08-05 PROCEDURE — 80048 BASIC METABOLIC PNL TOTAL CA: CPT | Performed by: INTERNAL MEDICINE

## 2024-08-05 ASSESSMENT — PAIN SCALES - GENERAL: PAINLEVEL: SEVERE PAIN (7)

## 2024-08-05 NOTE — ASSESSMENT & PLAN NOTE
Weight stable today, is following in weight management clinic.  He is quite frustrated with trouble he is having with weight loss.  He showed me a log of his food and he is eating 0684-7194 calories most days with 60-100g of protein. Is working with  on strength training 4-5 days a week. Discussed this is likely multifactorial with age, less muscle mass, back pain, etc.   - Encouraged continued work on lifestyle management as he is doing  - Continue metformin (he takes at night, so no need to hold day of surgery)   - Continue to follow with Dr. Colin

## 2024-08-05 NOTE — ASSESSMENT & PLAN NOTE
New on EKG today. When asked about symptoms, patient does note increasing GARIBAY over last year or so. Although is still able to work out with  4-5 days a week.   - Rapid access cardiology referral placed  - Stress test ordered  - Approval of procedure pending results of these things

## 2024-08-05 NOTE — ASSESSMENT & PLAN NOTE
Blood pressure well-controlled at home and in the office today on current regimen.  -Continue amlodipine 7.5 mg, hydrochlorothiazide 12.5 mg, imdur 90 mg and lisinopril 40 mg daily  -Takes all at night, so won't need to hold any day of surgery  -Continue low salt diet  -Continue to monitor at home

## 2024-08-05 NOTE — ASSESSMENT & PLAN NOTE
Improved with RFA through [facility], Dr. Bundy, in 2018. Same pain flaring again, another RFA is scheduled 8/15/24.

## 2024-08-05 NOTE — PATIENT INSTRUCTIONS
How to Take Your Medication Before Surgery  Preoperative Medication Instructions   - Take all medications night before same as usual, no need to hold anything   - No ibuprofen 3-7 days before procedure        Patient Education   Preparing for Your Surgery  Getting started  A nurse will call you to review your health history and instructions. They will give you an arrival time based on your scheduled surgery time. Please be ready to share:  Your doctor's clinic name and phone number  Your medical, surgical, and anesthesia history  A list of allergies and sensitivities  A list of medicines, including herbal treatments and over-the-counter drugs  Whether the patient has a legal guardian (ask how to send us the papers in advance)  Please tell us if you're pregnant--or if there's any chance you might be pregnant. Some surgeries may injure a fetus (unborn baby), so they require a pregnancy test. Surgeries that are safe for a fetus don't always need a test, and you can choose whether to have one.   If you have a child who's having surgery, please ask for a copy of Preparing for Your Child's Surgery.    Preparing for surgery  Within 10 to 30 days of surgery: Have a pre-op exam (sometimes called an H&P, or History and Physical). This can be done at a clinic or pre-operative center.  If you're having a , you may not need this exam. Talk to your care team.  At your pre-op exam, talk to your care team about all medicines you take. If you need to stop any medicines before surgery, ask when to start taking them again.  We do this for your safety. Many medicines can make you bleed too much during surgery. Some change how well surgery (anesthesia) drugs work.  Call your insurance company to let them know you're having surgery. (If you don't have insurance, call [phone].)  Call your clinic if there's any change in your health. This includes signs of a cold or flu (sore throat, runny nose, cough, rash, fever). It also  includes a scrape or scratch near the surgery site.  If you have questions on the day of surgery, call your hospital or surgery center.  Eating and drinking guidelines  For your safety: Unless your surgeon tells you otherwise, follow the guidelines below.  Eat and drink as usual until 8 hours before you arrive for surgery. After that, no food or milk.  Drink clear liquids until 2 hours before you arrive. These are liquids you can see through, like water, Gatorade, and Propel Water. They also include plain black coffee and tea (no cream or milk), candy, and breath mints. You can spit out gum when you arrive.  If you drink alcohol: Stop drinking it the night before surgery.  If your care team tells you to take medicine on the morning of surgery, it's okay to take it with a sip of water.  Preventing infection  Shower or bathe the night before and morning of your surgery. Follow the instructions your clinic gave you. (If no instructions, use regular soap.)  Don't shave or clip hair near your surgery site. We'll remove the hair if needed.  Don't smoke or vape the morning of surgery. You may chew nicotine gum up to 2 hours before surgery. A nicotine patch is okay.  Note: Some surgeries require you to completely quit smoking and nicotine. Check with your surgeon.  Your care team will make every effort to keep you safe from infection. We will:  Clean our hands often with soap and water (or an alcohol-based hand rub).  Clean the skin at your surgery site with a special soap that kills germs.  Give you a special gown to keep you warm. (Cold raises the risk of infection.)  Wear special hair covers, masks, gowns and gloves during surgery.  Give antibiotic medicine, if prescribed. Not all surgeries need antibiotics.  What to bring on the day of surgery  Photo ID and insurance card  Copy of your health care directive, if you have one  Glasses and hearing aids (bring cases)  You can't wear contacts during surgery  Inhaler and eye  drops, if you use them (tell us about these when you arrive)  CPAP machine or breathing device, if you use them  A few personal items, if spending the night  If you have . . .  A pacemaker, ICD (cardiac defibrillator) or other implant: Bring the ID card.  An implanted stimulator: Bring the remote control.  A legal guardian: Bring a copy of the certified (court-stamped) guardianship papers.  Please remove any jewelry, including body piercings. Leave jewelry and other valuables at home.  If you're going home the day of surgery  You must have a responsible adult drive you home. They should stay with you overnight as well.  If you don't have someone to stay with you, and you aren't safe to go home alone, we may keep you overnight. Insurance often won't pay for this.  After surgery  If it's hard to control your pain or you need more pain medicine, please call your surgeon's office.  Questions?   If you have any questions for your care team, list them here: _________________________________________________________________________________________________________________________________________________________________________ ____________________________________ ____________________________________ ____________________________________  For informational purposes only. Not to replace the advice of your health care provider. Copyright   2003, 2019 [facility]. All rights reserved. Clinically reviewed by Eve Lee MD. [employer] [id number] - REV 12/22.

## 2024-08-05 NOTE — ASSESSMENT & PLAN NOTE
Austin is here for preop prior to RFA with Dr. Bundy.  His chronic conditions are stable, we did do an EKG today that showed a new LBBB.   - BMP and CBC stable today  - Approval is pending cardiology evaluation for new LBBB

## 2024-08-05 NOTE — PROGRESS NOTES
Preoperative Evaluation  [address] [address]  Phone: [phone]  Fax: [phone]  Primary Provider: Yusra Pascual MD  Pre-op Performing Provider: Yusra Pascual MD  Aug 5, 2024         8/4/2024   Surgical Information   What procedure is being done? Nerve ablation / lower lumbar region   Facility or Hospital where procedure/surgery will be performed: [facility] [id number]   Who is doing the procedure / surgery? Joe Knox MD   Date of surgery / procedure: Aug 15 2024   Time of surgery / procedure: 9:30   Where do you plan to recover after surgery? at home with family        Fax number for surgical facility: Note does not need to be faxed, will be available electronically in Epic.    Assessment & Plan     The proposed surgical procedure is considered INTERMEDIATE risk.    Problem List Items Addressed This Visit          Digestive    Class 1 obesity due to excess calories with serious comorbidity and body mass index (BMI) of 33.0 to 33.9 in adult     Weight stable today, is following in weight management clinic.  He is quite frustrated with trouble he is having with weight loss.  He showed me a log of his food and he is eating 0482-3686 calories most days with 60-100g of protein. Is working with  on strength training 4-5 days a week. Discussed this is likely multifactorial with age, less muscle mass, back pain, etc.   - Encouraged continued work on lifestyle management as he is doing  - Continue metformin (he takes at night, so no need to hold day of surgery)   - Continue to follow with Dr. Colin            Circulatory    Essential hypertension     Blood pressure well-controlled at home and in the office today on current regimen.  -Continue amlodipine 7.5 mg, hydrochlorothiazide 12.5 mg, imdur 90 mg and lisinopril 40 mg daily  -Takes all at night, so won't need to hold any day of surgery  -Continue low salt  diet  -Continue to monitor at home         Relevant Orders    Basic metabolic panel (Completed)    CBC with platelets (Completed)    LBBB (left bundle branch block)     New on EKG today. When asked about symptoms, patient does note increasing GARIBAY over last year or so. Although is still able to work out with  4-5 days a week.   - Rapid access cardiology referral placed  - Stress test ordered  - Approval of procedure pending results of these things         Relevant Orders    Rapid Access Clinic  Referral    Exercise Stress Test - Adult       Musculoskeletal and Integumentary    Lumbosacral spondylosis without myelopathy     Improved with RFA through [facility], Dr. Bundy, in 2018. Same pain flaring again, another RFA is scheduled 8/15/24.             Urinary    Lower urinary tract symptoms (LUTS)     Nocturia improved with Flomax 0.8 mg nightly.            Other    Preop general physical exam - Primary     Austin is here for preop prior to RFA with Dr. Bundy.  His chronic conditions are stable, we did do an EKG today that showed a new LBBB.   - BMP and CBC stable today  - Approval is pending cardiology evaluation for new LBBB         Relevant Orders    EKG 12-lead, tracing only (Completed)        - No identified additional risk factors other than previously addressed    Antiplatelet or Anticoagulation Medication Instructions   - Patient is on no antiplatelet or anticoagulation medications.    Additional Medication Instructions   - ACE/ARB: Continue without modification (e.g., MAC anesthesia, neurosurgery, spine surgery, heart failure, or labile hypertension with risk of hypertension).   - Beta Blockers: Continue taking on the day of surgery.   - Calcium Channel Blockers: May be continued on the day of surgery.   - Diuretics: takes night prior, so will continue   - metformin: DO NOT TAKE day of surgery.    Recommendation:   Approval given to proceed with proposed procedure pending cardiology  consult and stress test. Will addend note when consult and stress test have resulted.     ADDENDUM 8/13/24:   - Patient had negative stress MPI 8/12/24 and was cleared by cardiology without need for further work-up for new LBBB on 8/13/24  - Approval given for procedure     Robert Avila is a 74 year old, presenting for the following:  Pre-Op Exam        8/5/2024     9:20 AM   Additional Questions   Roomed by HAYDEE Lee related to upcoming procedure: Flare of lumbosacral spondylosis w/out myelopathy this year. Returning to Dr. Bundy for RFA which has been helpful in the past.         8/4/2024   Pre-Op Questionnaire   Have you ever had a heart attack or stroke? No   Have you ever had surgery on your heart or blood vessels, such as a stent placement, a coronary artery bypass, or surgery on an artery in your head, neck, heart, or legs? No   Do you have chest pain with activity? No   Do you have a history of heart failure? No   Do you currently have a cold, bronchitis or symptoms of other infection? No   Do you have a cough, shortness of breath, or wheezing? No   Do you or anyone in your family have previous history of blood clots? No   Do you or does anyone in your family have a serious bleeding problem such as prolonged bleeding following surgeries or cuts? No   Have you ever had problems with anemia or been told to take iron pills? No   Have you had any abnormal blood loss such as black, tarry or bloody stools? No   Have you ever had a blood transfusion? No   Are you willing to have a blood transfusion if it is medically needed before, during, or after your surgery? Yes   Have you or any of your relatives ever had problems with anesthesia? No   Do you have sleep apnea, excessive snoring or daytime drowsiness? No   Do you have any artifical heart valves or other implanted medical devices like a pacemaker, defibrillator, or continuous glucose monitor? No   Do you have artificial joints? No   Are you  allergic to latex? No      HTN: amlodipine 7.5 mg, hydrochlorothiazide 12.5 mg, imdur 90 mg and lisinopril 40 mg daily. Home blood pressures 120-130s/80s. Today 124/70.     Obesity: Dr. Colin 6/13/24, cont metformin, f/up 4 months. Frustrated with weight today.   Wt Readings from Last 4 Encounters:   08/05/24 105.4 kg (232 lb 6.4 oz)   06/13/24 107.4 kg (236 lb 11.2 oz)   04/23/24 107.1 kg (236 lb 3.2 oz)   02/27/24 108.1 kg (238 lb 6.4 oz)     New LBBB: on retrospect, says he has been feeling more winded with exercise, last 18 months. No chest pain or shortness of breath at rest.     Did have BCC removed from L neck     Health Care Directive  Patient does not have a Health Care Directive or Living Will: Discussed advance care planning with patient; information given to patient to review.    Preoperative Review of    reviewed - no record of controlled substances prescribed.      Patient Active Problem List    Diagnosis Date Noted    Preop general physical exam 08/05/2024     Priority: Medium    LBBB (left bundle branch block) 08/05/2024     Priority: Medium    Skin lesion 04/23/2024     Priority: Medium    Lumbosacral spondylosis without myelopathy 01/30/2024     Priority: Medium    Encounter for Medicare annual wellness exam 10/23/2023     Priority: Medium    Class 1 obesity due to excess calories with serious comorbidity and body mass index (BMI) of 33.0 to 33.9 in adult 10/23/2023     Priority: Medium    Lower urinary tract symptoms (LUTS) 10/23/2023     Priority: Medium    Decreased hearing of both ears 10/23/2023     Priority: Medium    Seborrheic dermatitis 10/23/2023     Priority: Medium    Impaired fasting blood sugar 06/23/2023     Priority: Medium    Adenomatous polyp of colon 01/13/2021     Priority: Medium    Essential hypertension 05/07/2007     Priority: Medium      Past Medical History:   Diagnosis Date    Arthritis Not sure    treated with Aspirins    Cancer (H) 2016    Skin Cancers; treated  with Surgeries (3); involved back & Scalp    Hypertension     Dr Lilian Gonzales (HP / Retired) diagnosed the High BP     Past Surgical History:   Procedure Laterality Date    APPENDECTOMY      None    BIOPSY  May 2024    Skin CA - neck area    COLONOSCOPY      None    ORTHOPEDIC SURGERY      Modified structure of the ankle; cut and reattached Achilles tendon     Current Outpatient Medications   Medication Sig Dispense Refill    amLODIPine (NORVASC) 5 MG tablet Take 1.5 tablets (7.5 mg) by mouth daily 135 tablet 1    fish oil-omega-3 fatty acids 1000 MG capsule Take 2,000 mg by mouth      hydroCHLOROthiazide 12.5 MG tablet Take 1 tablet (12.5 mg) by mouth daily 90 tablet 1    isosorbide mononitrate (IMDUR) 30 MG 24 hr tablet Take 1 tablet (30 mg) by mouth daily 90 tablet 3    isosorbide mononitrate (IMDUR) 60 MG 24 hr tablet Take 1 tablet (60 mg) by mouth daily 90 tablet 3    ketoconazole (NIZORAL) 2 % external cream Apply to face once daily on Monday, Wednesday and Friday. Mix 1:1 with Elidel      ketoconazole (NIZORAL) 2 % external shampoo 2 mLs      lisinopril (ZESTRIL) 40 MG tablet Take 1 tablet (40 mg) by mouth daily 90 tablet 1    metFORMIN (GLUCOPHAGE XR) 500 MG 24 hr tablet Take 3 tablets daily with a meal 270 tablet 1    pimecrolimus (ELIDEL) 1 % external cream 1 g      tamsulosin (FLOMAX) 0.4 MG capsule Take 2 capsules (0.8 mg) by mouth daily 180 capsule 1       No Known Allergies     Social History     Tobacco Use    Smoking status: Never    Smokeless tobacco: Never    Tobacco comments:     No history with tobacco   Substance Use Topics    Alcohol use: Yes     Comment: Occasional.   Once or twice   a month.     Family History   Problem Relation Age of Onset    Cerebrovascular Disease Mother          from CVA complications ()    Thyroid Disease Mother         Life long    Hypertension Father     Colon Cancer Father         ; treated surgically    Obesity Father         Life long  "   Diabetes Father         Adult onset     History   Drug Use Unknown           Review of Systems  Constitutional, neuro, ENT, endocrine, pulmonary, cardiac, gastrointestinal, genitourinary, musculoskeletal, integument and psychiatric systems are negative, except as otherwise noted.    Objective    /70 (BP Location: Right arm, Patient Position: Sitting, Cuff Size: Adult Regular)   Pulse 80   Temp 97.7  F (36.5  C) (Oral)   Resp 17   Ht 1.791 m (5' 10.5\")   Wt 105.4 kg (232 lb 6.4 oz)   SpO2 97%   BMI 32.87 kg/m     Estimated body mass index is 32.87 kg/m  as calculated from the following:    Height as of this encounter: 1.791 m (5' 10.5\").    Weight as of this encounter: 105.4 kg (232 lb 6.4 oz).  Physical Exam  GENERAL: alert and no distress  EYES: Eyes grossly normal to inspection and conjunctivae and sclerae normal  HENT: nose and mouth without ulcers or lesions  NECK: no adenopathy, no asymmetry, masses. Well healed scar on L neck.   RESP: lungs clear to auscultation - no rales, rhonchi or wheezes  CV: regular rate and rhythm, normal S1 S2, no S3 or S4, no murmur, click or rub, no peripheral edema  ABDOMEN: soft, nontender  MS: no gross musculoskeletal defects noted, no edema  SKIN: no suspicious lesions or rashes on exposed skin   NEURO: No focal deficits, mentation intact and speech normal  PSYCH: mentation appears normal, affect normal/bright    Recent Labs   Lab Test 05/13/24  0821 01/30/24  1351 10/23/23  1532   HGB  --   --  14.5   PLT  --   --  186    136 136   POTASSIUM 4.3 4.5 3.9   CR 0.75 0.90 0.83        Diagnostics  Recent Results (from the past 24 hour(s))   EKG 12-lead, tracing only    Collection Time: 08/05/24  9:32 AM   Result Value Ref Range    Systolic Blood Pressure  mmHg    Diastolic Blood Pressure  mmHg    Ventricular Rate 82 BPM    Atrial Rate 82 BPM    WA Interval 170 ms    QRS Duration 164 ms     ms    QTc 500 ms    P Axis 72 degrees    R AXIS -1 degrees    T " Axis 109 degrees    Interpretation ECG       Sinus rhythm  Left bundle branch block  Abnormal ECG  No previous ECGs available     Basic metabolic panel    Collection Time: 08/05/24 10:10 AM   Result Value Ref Range    Sodium 134 (L) 135 - 145 mmol/L    Potassium 4.2 3.4 - 5.3 mmol/L    Chloride 98 98 - 107 mmol/L    Carbon Dioxide (CO2) 27 22 - 29 mmol/L    Anion Gap 9 7 - 15 mmol/L    Urea Nitrogen 16.2 8.0 - 23.0 mg/dL    Creatinine 0.76 0.67 - 1.17 mg/dL    GFR Estimate >90 >60 mL/min/1.73m2    Calcium 9.1 8.8 - 10.4 mg/dL    Glucose 97 70 - 99 mg/dL   CBC with platelets    Collection Time: 08/05/24 10:10 AM   Result Value Ref Range    WBC Count 5.3 4.0 - 11.0 10e3/uL    RBC Count 4.53 4.40 - 5.90 10e6/uL    Hemoglobin 14.2 13.3 - 17.7 g/dL    Hematocrit 41.6 40.0 - 53.0 %    MCV 92 78 - 100 fL    MCH 31.3 26.5 - 33.0 pg    MCHC 34.1 31.5 - 36.5 g/dL    RDW 11.8 10.0 - 15.0 %    Platelet Count 205 150 - 450 10e3/uL        Revised Cardiac Risk Index (RCRI)  The patient has the following serious cardiovascular risks for perioperative complications:   - No serious cardiac risks = 0 points     RCRI Interpretation: 0 points: Class I (very low risk - 0.4% complication rate)         Signed Electronically by: Yusra Pascual MD  A copy of this evaluation report is provided to the requesting physician.

## 2024-08-06 ENCOUNTER — TELEPHONE (OUTPATIENT)
Dept: INTERNAL MEDICINE | Facility: CLINIC | Age: 74
End: 2024-08-06
Payer: COMMERCIAL

## 2024-08-06 DIAGNOSIS — I10 ESSENTIAL HYPERTENSION: ICD-10-CM

## 2024-08-06 RX ORDER — LISINOPRIL 40 MG/1
40 TABLET ORAL DAILY
Qty: 90 TABLET | Refills: 1 | Status: SHIPPED | OUTPATIENT
Start: 2024-08-06

## 2024-08-06 NOTE — TELEPHONE ENCOUNTER
This patient would like a refill on their Lisinopril 40mg.    Thank you,  C[facility]acy[facility]am[facility] Matos

## 2024-08-07 DIAGNOSIS — I44.7 LBBB (LEFT BUNDLE BRANCH BLOCK): Primary | ICD-10-CM

## 2024-08-12 ENCOUNTER — HOSPITAL ENCOUNTER (OUTPATIENT)
Dept: CARDIOLOGY | Facility: HOSPITAL | Age: 74
Discharge: HOME | End: 2024-08-12
Attending: INTERNAL MEDICINE
Payer: COMMERCIAL

## 2024-08-12 ENCOUNTER — HOSPITAL ENCOUNTER (OUTPATIENT)
Dept: NUCLEAR MEDICINE | Facility: HOSPITAL | Age: 74
Discharge: HOME | End: 2024-08-12
Attending: INTERNAL MEDICINE
Payer: COMMERCIAL

## 2024-08-12 ENCOUNTER — E-VISIT (OUTPATIENT)
Dept: INTERNAL MEDICINE | Facility: CLINIC | Age: 74
End: 2024-08-12
Payer: COMMERCIAL

## 2024-08-12 DIAGNOSIS — I44.7 LBBB (LEFT BUNDLE BRANCH BLOCK): Primary | ICD-10-CM

## 2024-08-12 DIAGNOSIS — I44.7 LBBB (LEFT BUNDLE BRANCH BLOCK): ICD-10-CM

## 2024-08-12 LAB
CV STRESS CURRENT BP HE: NORMAL
CV STRESS CURRENT HR HE: 104
CV STRESS CURRENT HR HE: 104
CV STRESS CURRENT HR HE: 105
CV STRESS CURRENT HR HE: 77
CV STRESS CURRENT HR HE: 91
CV STRESS CURRENT HR HE: 91
CV STRESS CURRENT HR HE: 92
CV STRESS CURRENT HR HE: 92
CV STRESS CURRENT HR HE: 93
CV STRESS CURRENT HR HE: 93
CV STRESS CURRENT HR HE: 94
CV STRESS CURRENT HR HE: 96
CV STRESS CURRENT HR HE: 96
CV STRESS CURRENT HR HE: 97
CV STRESS DEVIATION TIME HE: NORMAL
CV STRESS ECHO PERCENT HR HE: NORMAL
CV STRESS EXERCISE STAGE HE: NORMAL
CV STRESS FINAL RESTING BP HE: NORMAL
CV STRESS FINAL RESTING HR HE: 94
CV STRESS MAX HR HE: 107
CV STRESS MAX TREADMILL GRADE HE: 0
CV STRESS MAX TREADMILL SPEED HE: 0
CV STRESS PEAK DIA BP HE: NORMAL
CV STRESS PEAK SYS BP HE: NORMAL
CV STRESS PHASE HE: NORMAL
CV STRESS PROTOCOL HE: NORMAL
CV STRESS RESTING PT POSITION HE: NORMAL
CV STRESS ST DEVIATION AMOUNT HE: NORMAL
CV STRESS ST DEVIATION ELEVATION HE: NORMAL
CV STRESS ST EVELATION AMOUNT HE: NORMAL
CV STRESS TEST TYPE HE: NORMAL
CV STRESS TOTAL STAGE TIME MIN 1 HE: NORMAL
NUC STRESS EJECTION FRACTION: 67 %
RATE PRESSURE PRODUCT: NORMAL
STRESS ECHO BASELINE DIASTOLIC HE: 95
STRESS ECHO BASELINE HR: 78
STRESS ECHO BASELINE SYSTOLIC BP: 163
STRESS ECHO CALCULATED PERCENT HR: 72 %
STRESS ECHO LAST STRESS DIASTOLIC BP: 76
STRESS ECHO LAST STRESS HR: 93
STRESS ECHO LAST STRESS SYSTOLIC BP: 162
STRESS ECHO TARGET HR: 148

## 2024-08-12 PROCEDURE — 93018 CV STRESS TEST I&R ONLY: CPT | Performed by: INTERNAL MEDICINE

## 2024-08-12 PROCEDURE — A9500 TC99M SESTAMIBI: HCPCS | Performed by: INTERNAL MEDICINE

## 2024-08-12 PROCEDURE — 93016 CV STRESS TEST SUPVJ ONLY: CPT | Performed by: INTERNAL MEDICINE

## 2024-08-12 PROCEDURE — 343N000001 HC RX 343: Performed by: INTERNAL MEDICINE

## 2024-08-12 PROCEDURE — 99207 PR NON-BILLABLE SERV PER CHARTING: CPT | Performed by: INTERNAL MEDICINE

## 2024-08-12 PROCEDURE — 93017 CV STRESS TEST TRACING ONLY: CPT

## 2024-08-12 PROCEDURE — 78452 HT MUSCLE IMAGE SPECT MULT: CPT

## 2024-08-12 PROCEDURE — 78452 HT MUSCLE IMAGE SPECT MULT: CPT | Mod: 26 | Performed by: INTERNAL MEDICINE

## 2024-08-12 PROCEDURE — 250N000011 HC RX IP 250 OP 636: Performed by: INTERNAL MEDICINE

## 2024-08-12 RX ORDER — REGADENOSON 0.08 MG/ML
0.4 INJECTION, SOLUTION INTRAVENOUS ONCE
Status: COMPLETED | OUTPATIENT
Start: 2024-08-12 | End: 2024-08-12

## 2024-08-12 RX ORDER — AMINOPHYLLINE 25 MG/ML
50-100 INJECTION, SOLUTION INTRAVENOUS
Status: DISCONTINUED | OUTPATIENT
Start: 2024-08-12 | End: 2024-08-13 | Stop reason: HOSPADM

## 2024-08-12 RX ADMIN — Medication 36.8 MILLICURIE: at 10:30

## 2024-08-12 RX ADMIN — Medication 8.4 MILLICURIE: at 08:55

## 2024-08-12 RX ADMIN — REGADENOSON 0.4 MG: 0.08 INJECTION, SOLUTION INTRAVENOUS at 10:26

## 2024-08-13 ENCOUNTER — OFFICE VISIT (OUTPATIENT)
Dept: CARDIOLOGY | Facility: CLINIC | Age: 74
End: 2024-08-13
Payer: COMMERCIAL

## 2024-08-13 VITALS
WEIGHT: 234 LBS | BODY MASS INDEX: 32.76 KG/M2 | HEART RATE: 86 BPM | RESPIRATION RATE: 18 BRPM | DIASTOLIC BLOOD PRESSURE: 60 MMHG | SYSTOLIC BLOOD PRESSURE: 118 MMHG | HEIGHT: 71 IN

## 2024-08-13 DIAGNOSIS — I44.7 LBBB (LEFT BUNDLE BRANCH BLOCK): Primary | ICD-10-CM

## 2024-08-13 PROCEDURE — 99204 OFFICE O/P NEW MOD 45 MIN: CPT | Performed by: INTERNAL MEDICINE

## 2024-08-13 NOTE — PATIENT INSTRUCTIONS
[facility]  Cardiac Electrophysiology  [address]  [place] [address]   Office: [phone]  Fax: [phone]       Thank you for seeing us in clinic today - it is a pleasure to be a part of your care team.  Below is a summary of our plan from today's visit.      You have been noted to have left bundle branch block (LBBB).  Your stress test shows normal ventricular function (left ventricular ejection fraction 67%, normal 55-65%), without ischemia or infarction.    We will plan for the following:  - echocardiogram  - we would suggest interval reassessment of ventricular function every 1-2 years - this can be coordinated with your primary care provider    Please do not hesitate to be in touch with our office at [phone] with any questions that may arise.      Thank you for trusting us with your care,    Kibmerlyn Zhu MD  Clinical Cardiac Electrophysiology  [facility]  [address]  [place] [address]   Office: [phone]  Fax: [phone]

## 2024-08-13 NOTE — PROGRESS NOTES
"[facility]  Cardiac Electrophysiology  [address]  [place] [address]   Office: [phone]  Fax: [phone]     Patient: David Sierra   : 1950       CHIEF COMPLAINT/REASON FOR VISIT  Left bundle branch block      Assessment/Recommendations     Left bundle branch block - QRS 164ms, Marcie LBBB, LVEF 67% without ischemia or prior infarction by 2024 MPI  - TTE  - expectant management for heart failure symptoms, reduced LV function (may consider LV function reassessment 1-2 years or symptom-directed)  - no indication for CRT at present, though this would be recommended in case of LVEF less than 35% with heart failure symptoms    Follow up: EP follow-up as needed           History of Present Illness   David Sierra is a 74 year old male with LBBB, HTN, obesity referred by Dr. Pascual for consultation regarding LBBB.    Mr. Sierra was noted to have LBBB on ECG at a PMD visit 2024.      He notes fatigue and exertional faitgue for example with mowing the lawn.  He walks outside once or twice daily, and exercises at the gym (limited recently due to lower back pain).  He notes some imbalance.  He denies chest pain, syncope.  He had lost ~65lbs via diet and exercise, though has regained much of this.    His father had an MI at 45-50 years of age, and passed away at 76 years of age.       Physical Examination  Review of Systems   VITALS: /60 (BP Location: Right arm, Patient Position: Sitting, Cuff Size: Adult Large)   Pulse 86   Resp 18   Ht 1.791 m (5' 10.5\")   Wt 106.1 kg (234 lb)   BMI 33.10 kg/m    Wt Readings from Last 3 Encounters:   24 105.4 kg (232 lb 6.4 oz)   24 107.4 kg (236 lb 11.2 oz)   24 107.1 kg (236 lb 3.2 oz)     CONSTITUTIONAL: well nourished, comfortable, no distress  EYES:  Conjunctivae pink, sclerae clear.    E/N/T:  Oral mucosa pink  RESPIRATORY:  Respiratory effort is normal  CARDIOVASCULAR:  normal S1 and " S2  GASTROINTESTINAL:  Abdomen without masses or tenderness  EXTREMITIES:  No clubbing or cyanosis.    MUSCULOSKELETAL:  Overall grossly normal muscle strength  SKIN:  Overall, skin warm and dry, no lesions.  NEURO/PSYCH:  Oriented x 3 with normal affect.   Constitutional:  No weight loss or loss of appetite    Eyes:  No difficulty with vision, no double vision, no dry eyes  ENT:  No sore throat, difficulty swallowing; changes in hearing or tinnitus  Cardiovascular: As detailed above  Respiratory:  No cough  Musculoskeletal  No joint pain, muscle aches  Neurologic:  No syncope, lightheadedness, fainting spells   Hematologic: No easy bruising, excessive bleeding tendency   Gastrointestinal:  No jaundice, abdominal pain or abdominal bloating  Genitourinary: No changes in urinary habits, no trouble urinating    Psychiatric: No anxiety or depression      Medical History  Surgical History   Past Medical History:   Diagnosis Date    Arthritis Not sure    treated with Aspirins    Cancer (H) 2016    Skin Cancers; treated with Surgeries (3); involved back & Scalp    Hypertension     Dr Lilian Gonzales (HP / Retired) diagnosed the High BP    Past Surgical History:   Procedure Laterality Date    APPENDECTOMY      None    BIOPSY  May 2024    Skin CA - neck area    COLONOSCOPY      None    ORTHOPEDIC SURGERY      Modified structure of the ankle; cut and reattached Achilles tendon         Family History Social History   Family History   Problem Relation Age of Onset    Cerebrovascular Disease Mother          from CVA complications ()    Thyroid Disease Mother         Life long    Hypertension Father     Colon Cancer Father         ; treated surgically    Obesity Father         Life long    Diabetes Father         Adult onset        Social History     Tobacco Use    Smoking status: Never    Smokeless tobacco: Never    Tobacco comments:     No history with tobacco   Vaping Use    Vaping status: Never Used  "  Substance Use Topics    Alcohol use: Yes     Comment: Occasional.   Once or twice   a month.    Drug use: Never         Medications  Allergies     Current Outpatient Medications:     amLODIPine (NORVASC) 5 MG tablet, Take 1.5 tablets (7.5 mg) by mouth daily, Disp: 135 tablet, Rfl: 1    fish oil-omega-3 fatty acids 1000 MG capsule, Take 2,000 mg by mouth, Disp: , Rfl:     hydroCHLOROthiazide 12.5 MG tablet, Take 1 tablet (12.5 mg) by mouth daily, Disp: 90 tablet, Rfl: 1    isosorbide mononitrate (IMDUR) 30 MG 24 hr tablet, Take 1 tablet (30 mg) by mouth daily, Disp: 90 tablet, Rfl: 3    isosorbide mononitrate (IMDUR) 60 MG 24 hr tablet, Take 1 tablet (60 mg) by mouth daily, Disp: 90 tablet, Rfl: 3    ketoconazole (NIZORAL) 2 % external cream, Apply to face once daily on Monday, Wednesday and Friday. Mix 1:1 with Elidel, Disp: , Rfl:     ketoconazole (NIZORAL) 2 % external shampoo, 2 mLs, Disp: , Rfl:     lisinopril (ZESTRIL) 40 MG tablet, Take 1 tablet (40 mg) by mouth daily, Disp: 90 tablet, Rfl: 1    metFORMIN (GLUCOPHAGE XR) 500 MG 24 hr tablet, Take 3 tablets daily with a meal, Disp: 270 tablet, Rfl: 1    pimecrolimus (ELIDEL) 1 % external cream, 1 g, Disp: , Rfl:     tamsulosin (FLOMAX) 0.4 MG capsule, Take 2 capsules (0.8 mg) by mouth daily, Disp: 180 capsule, Rfl: 1  No current facility-administered medications for this visit.   No Known Allergies       Lab Results    Chemistry CBC Cardiac Enzymes/BNP/TSH/INR   Recent Labs   Lab Test 08/05/24  1010   *   POTASSIUM 4.2   CHLORIDE 98   CO2 27   GLC 97   BUN 16.2   CR 0.76   GFRESTIMATED >90   BRIANNA 9.1     Recent Labs   Lab Test 08/05/24  1010 05/13/24  0821 01/30/24  1351   CR 0.76 0.75 0.90          Recent Labs   Lab Test 08/05/24  1010   WBC 5.3   HGB 14.2   HCT 41.6   MCV 92        Recent Labs   Lab Test 08/05/24  1010 10/23/23  1532 06/23/23  1548   HGB 14.2 14.5 14.6    No results for input(s): \"TROPONINI\" in the last 45673 hours.  No results " "for input(s): \"BNP\", \"NTBNPI\", \"NTBNP\" in the last 78880 hours.  Recent Labs   Lab Test 06/23/23  1548   TSH 1.49     No results for input(s): \"INR\" in the last 51685 hours.      Data Review    ECGs (tracings independently reviewed)  8/5/2024 - SR 82bpm, LBBB QRS 164ms  12/4/2018 (report only) - SR 66bpm, QRS 98ms    8/12/2024 MPI    1.Nondiagnostic pharmacological regadenoson ECG for ischemia given left bundle branch block pattern.    2.The nuclear stress test is negative for inducible myocardial ischemia or infarction.  Septal defect on perfusion diagram is felt to represent left bundle branch block pattern artifact.    3.The left ventricular ejection fraction at stress is 67%.    There is no prior study for comparison.       Cc: Yusra Zhu MD  8/13/2024  9:15 AM      "

## 2024-08-13 NOTE — LETTER
"2024    Yusra Pascual MD  [address] [facility] [address]    RE: David Sierra       Dear Colleague,     I had the pleasure of seeing David Sierra in the [facility].     [facility]  Cardiac Electrophysiology  [address]  [place] [address]   Office: [phone]  Fax: [phone]     Patient: David Sierra   : 1950       CHIEF COMPLAINT/REASON FOR VISIT  Left bundle branch block      Assessment/Recommendations     Left bundle branch block - QRS 164ms, Marcie LBBB, LVEF 67% without ischemia or prior infarction by 2024 MPI  - TTE  - expectant management for heart failure symptoms, reduced LV function (may consider LV function reassessment 1-2 years or symptom-directed)  - no indication for CRT at present, though this would be recommended in case of LVEF less than 35% with heart failure symptoms    Follow up: EP follow-up as needed           History of Present Illness   David Sierra is a 74 year old male with LBBB, HTN, obesity referred by Dr. Pascual for consultation regarding LBBB.    Mr. Sierra was noted to have LBBB on ECG at a PMD visit 2024.      He notes fatigue and exertional faitgue for example with mowing the lawn.  He walks outside once or twice daily, and exercises at the gym (limited recently due to lower back pain).  He notes some imbalance.  He denies chest pain, syncope.  He had lost ~65lbs via diet and exercise, though has regained much of this.    His father had an MI at 45-50 years of age, and passed away at 76 years of age.       Physical Examination  Review of Systems   VITALS: /60 (BP Location: Right arm, Patient Position: Sitting, Cuff Size: Adult Large)   Pulse 86   Resp 18   Ht 1.791 m (5' 10.5\")   Wt 106.1 kg (234 lb)   BMI 33.10 kg/m    Wt Readings from Last 3 Encounters:   24 105.4 kg (232 lb 6.4 oz)   24 107.4 kg (236 lb 11.2 oz)   24 107.1 kg (236 " lb 3.2 oz)     CONSTITUTIONAL: well nourished, comfortable, no distress  EYES:  Conjunctivae pink, sclerae clear.    E/N/T:  Oral mucosa pink  RESPIRATORY:  Respiratory effort is normal  CARDIOVASCULAR:  normal S1 and S2  GASTROINTESTINAL:  Abdomen without masses or tenderness  EXTREMITIES:  No clubbing or cyanosis.    MUSCULOSKELETAL:  Overall grossly normal muscle strength  SKIN:  Overall, skin warm and dry, no lesions.  NEURO/PSYCH:  Oriented x 3 with normal affect.   Constitutional:  No weight loss or loss of appetite    Eyes:  No difficulty with vision, no double vision, no dry eyes  ENT:  No sore throat, difficulty swallowing; changes in hearing or tinnitus  Cardiovascular: As detailed above  Respiratory:  No cough  Musculoskeletal  No joint pain, muscle aches  Neurologic:  No syncope, lightheadedness, fainting spells   Hematologic: No easy bruising, excessive bleeding tendency   Gastrointestinal:  No jaundice, abdominal pain or abdominal bloating  Genitourinary: No changes in urinary habits, no trouble urinating    Psychiatric: No anxiety or depression      Medical History  Surgical History   Past Medical History:   Diagnosis Date     Arthritis Not sure    treated with Aspirins     Cancer (H) 2016    Skin Cancers; treated with Surgeries (3); involved back & Scalp     Hypertension     Dr Lilian Gonzales (HP / Retired) diagnosed the High BP    Past Surgical History:   Procedure Laterality Date     APPENDECTOMY      None     BIOPSY  May 2024    Skin CA - neck area     COLONOSCOPY      None     ORTHOPEDIC SURGERY      Modified structure of the ankle; cut and reattached Achilles tendon         Family History Social History   Family History   Problem Relation Age of Onset     Cerebrovascular Disease Mother          from CVA complications ()     Thyroid Disease Mother         Life long     Hypertension Father      Colon Cancer Father         ; treated surgically     Obesity Father          Life long     Diabetes Father         Adult onset        Social History     Tobacco Use     Smoking status: Never     Smokeless tobacco: Never     Tobacco comments:     No history with tobacco   Vaping Use     Vaping status: Never Used   Substance Use Topics     Alcohol use: Yes     Comment: Occasional.   Once or twice   a month.     Drug use: Never         Medications  Allergies     Current Outpatient Medications:      amLODIPine (NORVASC) 5 MG tablet, Take 1.5 tablets (7.5 mg) by mouth daily, Disp: 135 tablet, Rfl: 1     fish oil-omega-3 fatty acids 1000 MG capsule, Take 2,000 mg by mouth, Disp: , Rfl:      hydroCHLOROthiazide 12.5 MG tablet, Take 1 tablet (12.5 mg) by mouth daily, Disp: 90 tablet, Rfl: 1     isosorbide mononitrate (IMDUR) 30 MG 24 hr tablet, Take 1 tablet (30 mg) by mouth daily, Disp: 90 tablet, Rfl: 3     isosorbide mononitrate (IMDUR) 60 MG 24 hr tablet, Take 1 tablet (60 mg) by mouth daily, Disp: 90 tablet, Rfl: 3     ketoconazole (NIZORAL) 2 % external cream, Apply to face once daily on Monday, Wednesday and Friday. Mix 1:1 with Elidel, Disp: , Rfl:      ketoconazole (NIZORAL) 2 % external shampoo, 2 mLs, Disp: , Rfl:      lisinopril (ZESTRIL) 40 MG tablet, Take 1 tablet (40 mg) by mouth daily, Disp: 90 tablet, Rfl: 1     metFORMIN (GLUCOPHAGE XR) 500 MG 24 hr tablet, Take 3 tablets daily with a meal, Disp: 270 tablet, Rfl: 1     pimecrolimus (ELIDEL) 1 % external cream, 1 g, Disp: , Rfl:      tamsulosin (FLOMAX) 0.4 MG capsule, Take 2 capsules (0.8 mg) by mouth daily, Disp: 180 capsule, Rfl: 1  No current facility-administered medications for this visit.   No Known Allergies       Lab Results    Chemistry CBC Cardiac Enzymes/BNP/TSH/INR   Recent Labs   Lab Test 08/05/24  1010   *   POTASSIUM 4.2   CHLORIDE 98   CO2 27   GLC 97   BUN 16.2   CR 0.76   GFRESTIMATED >90   BRIANNA 9.1     Recent Labs   Lab Test 08/05/24  1010 05/13/24  0821 01/30/24  1351   CR 0.76 0.75 0.90          Recent  "Labs   Lab Test 08/05/24  1010   WBC 5.3   HGB 14.2   HCT 41.6   MCV 92        Recent Labs   Lab Test 08/05/24  1010 10/23/23  1532 06/23/23  1548   HGB 14.2 14.5 14.6    No results for input(s): \"TROPONINI\" in the last 45380 hours.  No results for input(s): \"BNP\", \"NTBNPI\", \"NTBNP\" in the last 07428 hours.  Recent Labs   Lab Test 06/23/23  1548   TSH 1.49     No results for input(s): \"INR\" in the last 69106 hours.      Data Review    ECGs (tracings independently reviewed)  8/5/2024 - SR 82bpm, LBBB QRS 164ms  12/4/2018 (report only) - SR 66bpm, QRS 98ms    8/12/2024 MPI     1.Nondiagnostic pharmacological regadenoson ECG for ischemia given left bundle branch block pattern.     2.The nuclear stress test is negative for inducible myocardial ischemia or infarction.  Septal defect on perfusion diagram is felt to represent left bundle branch block pattern artifact.     3.The left ventricular ejection fraction at stress is 67%.     There is no prior study for comparison.       Cc: uYsra Zhu MD  8/13/2024  9:15 AM        Thank you for allowing me to participate in the care of your patient.      Sincerely,     Kimberlyn Zhu MD     [facility]  cc:   Yusra Pascual MD  [address] [place] [address]      "

## 2024-08-29 ENCOUNTER — HOSPITAL ENCOUNTER (OUTPATIENT)
Dept: CARDIOLOGY | Facility: HOSPITAL | Age: 74
Discharge: HOME | End: 2024-08-29
Attending: INTERNAL MEDICINE | Admitting: INTERNAL MEDICINE
Payer: COMMERCIAL

## 2024-08-29 DIAGNOSIS — I44.7 LBBB (LEFT BUNDLE BRANCH BLOCK): ICD-10-CM

## 2024-08-29 LAB — LVEF ECHO: NORMAL

## 2024-08-29 PROCEDURE — 93306 TTE W/DOPPLER COMPLETE: CPT

## 2024-08-29 PROCEDURE — 93306 TTE W/DOPPLER COMPLETE: CPT | Mod: 26 | Performed by: INTERNAL MEDICINE

## 2024-09-06 ENCOUNTER — MYC MEDICAL ADVICE (OUTPATIENT)
Dept: INTERNAL MEDICINE | Facility: CLINIC | Age: 74
End: 2024-09-06
Payer: COMMERCIAL

## 2024-09-09 NOTE — TELEPHONE ENCOUNTER
Needs to be seen for exam/to discuss.     If I have any open slots prior to my leave, we can use to have him be seen for this issue. If not, would recommend seeing a partner.

## 2024-09-17 ENCOUNTER — OFFICE VISIT (OUTPATIENT)
Dept: INTERNAL MEDICINE | Facility: CLINIC | Age: 74
End: 2024-09-17
Payer: COMMERCIAL

## 2024-09-17 VITALS
HEART RATE: 86 BPM | RESPIRATION RATE: 18 BRPM | SYSTOLIC BLOOD PRESSURE: 132 MMHG | DIASTOLIC BLOOD PRESSURE: 80 MMHG | WEIGHT: 236.4 LBS | BODY MASS INDEX: 33.1 KG/M2 | OXYGEN SATURATION: 96 % | TEMPERATURE: 98.5 F | HEIGHT: 71 IN

## 2024-09-17 DIAGNOSIS — M54.16 LUMBAR RADICULOPATHY: Primary | ICD-10-CM

## 2024-09-17 DIAGNOSIS — J06.9 VIRAL UPPER RESPIRATORY TRACT INFECTION: ICD-10-CM

## 2024-09-17 DIAGNOSIS — M47.817 LUMBOSACRAL SPONDYLOSIS WITHOUT MYELOPATHY: ICD-10-CM

## 2024-09-17 PROCEDURE — 99214 OFFICE O/P EST MOD 30 MIN: CPT | Performed by: NURSE PRACTITIONER

## 2024-09-17 RX ORDER — GABAPENTIN 300 MG/1
300 CAPSULE ORAL AT BEDTIME
Qty: 30 CAPSULE | Refills: 1 | Status: SHIPPED | OUTPATIENT
Start: 2024-09-17 | End: 2024-10-07

## 2024-09-17 ASSESSMENT — PAIN SCALES - GENERAL: PAINLEVEL: SEVERE PAIN (7)

## 2024-09-17 NOTE — ASSESSMENT & PLAN NOTE
Suspect that right low back and leg pain may be r/t lumbar radiculopathy. PT has been ineffective in alleviating pain.   - Trial gabapentin 300 mg nightly. Offered a titration schedule up to TID, he declines and would prefer to stick with a lower dose  - Advised a reduction in NSAID dose, do not exceed 800 mg per dose of ibuprofen and ideally limit frequency/dose in general. May combine ibuprofen and acetaminophen to see if the combination is more effective for pain relief  - Schedule a follow up visit with orthopedic provider regarding symptoms given history

## 2024-09-17 NOTE — PROGRESS NOTES
"  Assessment & Plan   Problem List Items Addressed This Visit       Lumbosacral spondylosis without myelopathy     Suspect that right low back and leg pain may be r/t lumbar radiculopathy. PT has been ineffective in alleviating pain.   - Trial gabapentin 300 mg nightly. Offered a titration schedule up to TID, he declines and would prefer to stick with a lower dose  - Advised a reduction in NSAID dose, do not exceed 800 mg per dose of ibuprofen and ideally limit frequency/dose in general. May combine ibuprofen and acetaminophen to see if the combination is more effective for pain relief  - Schedule a follow up visit with orthopedic provider regarding symptoms given history           Other Visit Diagnoses       Lumbar radiculopathy    -  Primary    Relevant Medications    gabapentin (NEURONTIN) 300 MG capsule    Viral upper respiratory tract infection            Supportive cares are recommended for viral respiratory infection.  He was planning to go visit new grandbaby later this week with travel by air.  He is technically outside of the window of time that he would be considered infectious, however with a  baby and with an abundance of caution  I would recommend that he defer his travel since he does not feel that there has been significant improvement in his cough since onset.          BMI  Estimated body mass index is 33.44 kg/m  as calculated from the following:    Height as of this encounter: 1.791 m (5' 10.5\").    Weight as of this encounter: 107.2 kg (236 lb 6.4 oz).     Return in about 2 weeks (around 10/1/2024) for 2-4 week follow up with PM&R or myself (PCP going on maternity leave) to f/u gabapentin.      Robert Avila is a 74 year old, presenting for the following health issues:  Pain (Shooting pain extending down to the right leg from area beneath the pelvic crest./Pain has been going on for months. /Patient states he had nerve ablation twice. /Patient states he has a lot of trouble walking. " ")        9/17/2024     7:49 AM   Additional Questions   Roomed by Patrice BAUTISTA MA     History of Present Illness       Reason for visit:  Low back pain (sacral area) / shooting pain, right leg.   Significant weakness  in right quad.    He eats 2-3 servings of fruits and vegetables daily.He consumes 0 sweetened beverage(s) daily.He exercises with enough effort to increase his heart rate 20 to 29 minutes per day.  He exercises with enough effort to increase his heart rate 4 days per week.   He is taking medications regularly.    He has had a cough and fatigue for the past 2 weeks. The cough has not changed. No shortness of breath. No fevers. He is taking an OTC cough suppressant which has helped.     He had an RFA recently of L1, L2 and about 7 years ago as well. His low back pain improved significantly but he continues to have pain around the right iliac crest and into the right quadriceps area has been painful. It almost feels like it would \"if there was a boil on the upper inner thigh\". He feels pain in the right inner thigh which feels like a burning pain and it makes it difficult to walk- this started about 8 months ago he estimates. He has tried physical therapy without any improvement in this particular pain. He has been taking ibuprofen 1000 mg twice per day, does not take every day. No sudden loss of bowel/bladder function.     Joe Bundy is his PM&R provider. He did not discuss the right thigh pain as he did not want to complicate/muddy the water with the RFA. He also held out hope that the procedure would improve his leg pain as well.     He has a history of a right proximal hamstring tendon rupture by his report, this was about 10 years ago.     He worked in medical imaging in the past.         Objective    /80 (BP Location: Right arm, Patient Position: Sitting, Cuff Size: Adult Regular)   Pulse 86   Temp 98.5  F (36.9  C) (Oral)   Resp 18   Ht 1.791 m (5' 10.5\")   Wt 107.2 kg (236 lb 6.4 oz)   " SpO2 96%   BMI 33.44 kg/m    Body mass index is 33.44 kg/m .    Physical Exam   GENERAL: alert and no distress  RESP: lungs clear to auscultation - no rales, rhonchi or wheezes  CV: regular rate and rhythm, normal S1 S2  MS: Weakness right quadriceps grade 4/5  SKIN: no rashes on the back               Signed Electronically by: Angi Garrido NP

## 2024-09-17 NOTE — PATIENT INSTRUCTIONS
500-1000 mg of acetaminophen (Tylenol) every 6 hours WITH ibuprofen 200-800 mg. But limit ibuprofen use in particular     Gabapentin 300 mg at bedtime

## 2024-10-07 ENCOUNTER — OFFICE VISIT (OUTPATIENT)
Dept: INTERNAL MEDICINE | Facility: CLINIC | Age: 74
End: 2024-10-07
Payer: COMMERCIAL

## 2024-10-07 VITALS
HEIGHT: 71 IN | HEART RATE: 86 BPM | OXYGEN SATURATION: 97 % | TEMPERATURE: 97.8 F | WEIGHT: 241.7 LBS | SYSTOLIC BLOOD PRESSURE: 120 MMHG | DIASTOLIC BLOOD PRESSURE: 76 MMHG | RESPIRATION RATE: 16 BRPM | BODY MASS INDEX: 33.84 KG/M2

## 2024-10-07 DIAGNOSIS — M54.16 LUMBAR RADICULOPATHY: ICD-10-CM

## 2024-10-07 PROCEDURE — G0008 ADMIN INFLUENZA VIRUS VAC: HCPCS | Performed by: NURSE PRACTITIONER

## 2024-10-07 PROCEDURE — 90662 IIV NO PRSV INCREASED AG IM: CPT | Performed by: NURSE PRACTITIONER

## 2024-10-07 PROCEDURE — 90480 ADMN SARSCOV2 VAC 1/ONLY CMP: CPT | Performed by: NURSE PRACTITIONER

## 2024-10-07 PROCEDURE — 99214 OFFICE O/P EST MOD 30 MIN: CPT | Mod: 25 | Performed by: NURSE PRACTITIONER

## 2024-10-07 PROCEDURE — 91320 SARSCV2 VAC 30MCG TRS-SUC IM: CPT | Performed by: NURSE PRACTITIONER

## 2024-10-07 RX ORDER — GABAPENTIN 300 MG/1
300 CAPSULE ORAL 2 TIMES DAILY
Qty: 60 CAPSULE | Refills: 3 | Status: SHIPPED | OUTPATIENT
Start: 2024-10-07

## 2024-10-07 ASSESSMENT — PAIN SCALES - GENERAL: PAINLEVEL: SEVERE PAIN (7)

## 2024-10-07 NOTE — ASSESSMENT & PLAN NOTE
Continue with gabapentin which has been effective in reducing pain   - Gabapentin 300 mg BID. Suggesting increasing to TID but he prefers to stay on the lower dose  - Follow up with PM&R provider. He has already tried physical therapy in the past but found this to intensify his pain

## 2024-10-07 NOTE — PROGRESS NOTES
"  Assessment & Plan   Problem List Items Addressed This Visit       Lumbar radiculopathy     Continue with gabapentin which has been effective in reducing pain   - Gabapentin 300 mg BID. Suggesting increasing to TID but he prefers to stay on the lower dose  - Follow up with PM&R provider. He has already tried physical therapy in the past but found this to intensify his pain          Relevant Medications    gabapentin (NEURONTIN) 300 MG capsule      - influenza and COVID vaccines today        BMI  Estimated body mass index is 34.19 kg/m  as calculated from the following:    Height as of this encounter: 1.791 m (5' 10.5\").    Weight as of this encounter: 109.6 kg (241 lb 11.2 oz).       Return in about 4 months (around 2/7/2025) for follow up with PCP as scheduled in February .      Robert Avila is a 74 year old, presenting for the following health issues:  Follow Up (Lumbar ablation- successful), Extremity Weakness (Right quad), and Chronic Cough (Getting better)        10/7/2024     3:14 PM   Additional Questions   Roomed by GREG Cool   Accompanied by ZANDRA     History of Present Illness       Reason for visit:  F/U on previous visit.  Substantial weakness in right quad; lumbar ablation did not address.  Continued residual cough (Post-RSV??).He consumes 0 sweetened beverage(s) daily. He exercises with enough effort to increase his heart rate 4 days per week.   He is taking medications regularly.     The right iliac crest area and quadriceps areas are painful. He has found the gabapentin to be helpful. He has been taking it twice per day and tolerates this well. His pain level is currently about 7/10. He has cut back on ibuprofen but continues this along with acetaminophen.     He was ill at his last visit. He reports that he still has a dry cough every 2-3 days but that it is improving.     Obesity- He has a history of losing weight with Medifast, he lost 70 lbs. He has tried to reduce caloric intake and " "estimates that he eats 1400 calories per day. He has seen the bariatric clinic.         Objective    /76 (BP Location: Right arm, Patient Position: Sitting, Cuff Size: Adult Regular)   Pulse 86   Temp 97.8  F (36.6  C) (Oral)   Resp 16   Ht 1.791 m (5' 10.5\")   Wt 109.6 kg (241 lb 11.2 oz)   SpO2 97%   BMI 34.19 kg/m    Body mass index is 34.19 kg/m .    Wt Readings from Last 5 Encounters:   10/07/24 109.6 kg (241 lb 11.2 oz)   09/17/24 107.2 kg (236 lb 6.4 oz)   08/13/24 106.1 kg (234 lb)   08/05/24 105.4 kg (232 lb 6.4 oz)   06/13/24 107.4 kg (236 lb 11.2 oz)       Physical Exam   GENERAL: alert and no distress          The longitudinal plan of care for the diagnosis(es)/condition(s) as documented were addressed during this visit. Due to the added complexity in care, I will continue to support Austin in the subsequent management and with ongoing continuity of care.    Signed Electronically by: Angi Garrido NP  "

## 2024-10-17 ENCOUNTER — TELEPHONE (OUTPATIENT)
Dept: SURGERY | Facility: CLINIC | Age: 74
End: 2024-10-17
Payer: COMMERCIAL

## 2024-10-17 NOTE — TELEPHONE ENCOUNTER
Responded via fax.     Naya Benavidez RN, CBN  [facility]  P [phone]  F [phone]

## 2024-10-17 NOTE — TELEPHONE ENCOUNTER
General Call      Reason for Call:  Follow up call    What are your questions or concerns:  Caller stated the patient is diabetic & not on a statin medication. Per caller they sent something a while ago but haven't heard back.    Date of last appointment with provider: 06/13/24    Could we send this information to you in [employer] or would you prefer to receive a phone call?:   Patient would prefer a phone call   Okay to leave a detailed message?: Yes at Other phone number:  [phone]*

## 2024-11-18 ENCOUNTER — MYC MEDICAL ADVICE (OUTPATIENT)
Dept: INTERNAL MEDICINE | Facility: CLINIC | Age: 74
End: 2024-11-18
Payer: COMMERCIAL

## 2024-11-18 ENCOUNTER — MYC REFILL (OUTPATIENT)
Dept: INTERNAL MEDICINE | Facility: CLINIC | Age: 74
End: 2024-11-18
Payer: COMMERCIAL

## 2024-11-18 DIAGNOSIS — I10 ESSENTIAL HYPERTENSION: ICD-10-CM

## 2024-11-18 RX ORDER — ISOSORBIDE MONONITRATE 60 MG/1
60 TABLET, EXTENDED RELEASE ORAL DAILY
Qty: 90 TABLET | Refills: 3 | Status: SHIPPED | OUTPATIENT
Start: 2024-11-18

## 2024-11-18 RX ORDER — ISOSORBIDE MONONITRATE 30 MG/1
30 TABLET, EXTENDED RELEASE ORAL DAILY
Qty: 90 TABLET | Refills: 3 | Status: SHIPPED | OUTPATIENT
Start: 2024-11-18

## 2024-12-04 ENCOUNTER — MYC REFILL (OUTPATIENT)
Dept: INTERNAL MEDICINE | Facility: CLINIC | Age: 74
End: 2024-12-04
Payer: COMMERCIAL

## 2024-12-04 DIAGNOSIS — R39.9 LOWER URINARY TRACT SYMPTOMS (LUTS): ICD-10-CM

## 2024-12-04 RX ORDER — TAMSULOSIN HYDROCHLORIDE 0.4 MG/1
0.8 CAPSULE ORAL DAILY
Qty: 180 CAPSULE | Refills: 0 | Status: SHIPPED | OUTPATIENT
Start: 2024-12-04

## 2024-12-04 NOTE — TELEPHONE ENCOUNTER
Patient should be only taking 0.8mg per day to minimize risk of orthostatic hypotension/ floppy iris syndrome. If his BPH is not controlled, we should have him see urology and start finasteride.

## 2024-12-31 ENCOUNTER — MYC REFILL (OUTPATIENT)
Dept: INTERNAL MEDICINE | Facility: CLINIC | Age: 74
End: 2024-12-31
Payer: COMMERCIAL

## 2024-12-31 DIAGNOSIS — I10 ESSENTIAL HYPERTENSION: ICD-10-CM

## 2024-12-31 RX ORDER — AMLODIPINE BESYLATE 5 MG/1
7.5 TABLET ORAL DAILY
Qty: 135 TABLET | Refills: 2 | Status: SHIPPED | OUTPATIENT
Start: 2024-12-31

## 2025-01-14 DIAGNOSIS — R73.01 IMPAIRED FASTING BLOOD SUGAR: ICD-10-CM

## 2025-01-14 DIAGNOSIS — E66.09 CLASS 1 OBESITY DUE TO EXCESS CALORIES WITH SERIOUS COMORBIDITY AND BODY MASS INDEX (BMI) OF 33.0 TO 33.9 IN ADULT: ICD-10-CM

## 2025-01-14 DIAGNOSIS — E66.811 CLASS 1 OBESITY DUE TO EXCESS CALORIES WITH SERIOUS COMORBIDITY AND BODY MASS INDEX (BMI) OF 33.0 TO 33.9 IN ADULT: ICD-10-CM

## 2025-01-20 RX ORDER — METFORMIN HYDROCHLORIDE 500 MG/1
TABLET, EXTENDED RELEASE ORAL
Qty: 270 TABLET | Refills: 1 | Status: SHIPPED | OUTPATIENT
Start: 2025-01-20

## 2025-02-10 ENCOUNTER — OFFICE VISIT (OUTPATIENT)
Dept: INTERNAL MEDICINE | Facility: CLINIC | Age: 75
End: 2025-02-10
Payer: COMMERCIAL

## 2025-02-10 VITALS
HEIGHT: 71 IN | RESPIRATION RATE: 16 BRPM | WEIGHT: 235.3 LBS | SYSTOLIC BLOOD PRESSURE: 153 MMHG | OXYGEN SATURATION: 97 % | DIASTOLIC BLOOD PRESSURE: 94 MMHG | TEMPERATURE: 98.1 F | HEART RATE: 90 BPM | BODY MASS INDEX: 32.94 KG/M2

## 2025-02-10 DIAGNOSIS — I44.7 LBBB (LEFT BUNDLE BRANCH BLOCK): ICD-10-CM

## 2025-02-10 DIAGNOSIS — E66.09 CLASS 1 OBESITY DUE TO EXCESS CALORIES WITH SERIOUS COMORBIDITY AND BODY MASS INDEX (BMI) OF 33.0 TO 33.9 IN ADULT: ICD-10-CM

## 2025-02-10 DIAGNOSIS — I10 ESSENTIAL HYPERTENSION: ICD-10-CM

## 2025-02-10 DIAGNOSIS — M54.16 LUMBAR RADICULOPATHY: ICD-10-CM

## 2025-02-10 DIAGNOSIS — R39.9 LOWER URINARY TRACT SYMPTOMS (LUTS): ICD-10-CM

## 2025-02-10 DIAGNOSIS — Z00.00 ENCOUNTER FOR MEDICARE ANNUAL WELLNESS EXAM: Primary | ICD-10-CM

## 2025-02-10 DIAGNOSIS — N52.9 ERECTILE DYSFUNCTION, UNSPECIFIED ERECTILE DYSFUNCTION TYPE: ICD-10-CM

## 2025-02-10 DIAGNOSIS — L21.9 SEBORRHEIC DERMATITIS: ICD-10-CM

## 2025-02-10 DIAGNOSIS — E66.811 CLASS 1 OBESITY DUE TO EXCESS CALORIES WITH SERIOUS COMORBIDITY AND BODY MASS INDEX (BMI) OF 33.0 TO 33.9 IN ADULT: ICD-10-CM

## 2025-02-10 DIAGNOSIS — Z12.5 SCREENING FOR PROSTATE CANCER: ICD-10-CM

## 2025-02-10 PROBLEM — Z01.818 PREOP GENERAL PHYSICAL EXAM: Status: RESOLVED | Noted: 2024-08-05 | Resolved: 2025-02-10

## 2025-02-10 LAB
ALBUMIN SERPL BCG-MCNC: 4.3 G/DL (ref 3.5–5.2)
ALP SERPL-CCNC: 71 U/L (ref 40–150)
ALT SERPL W P-5'-P-CCNC: 9 U/L (ref 0–70)
ANION GAP SERPL CALCULATED.3IONS-SCNC: 12 MMOL/L (ref 7–15)
AST SERPL W P-5'-P-CCNC: 18 U/L (ref 0–45)
BILIRUB SERPL-MCNC: 0.5 MG/DL
BUN SERPL-MCNC: 18.3 MG/DL (ref 8–23)
CALCIUM SERPL-MCNC: 9.8 MG/DL (ref 8.8–10.4)
CHLORIDE SERPL-SCNC: 101 MMOL/L (ref 98–107)
CHOLEST SERPL-MCNC: 195 MG/DL
CREAT SERPL-MCNC: 1.07 MG/DL (ref 0.67–1.17)
EGFRCR SERPLBLD CKD-EPI 2021: 74 ML/MIN/1.73M2
ERYTHROCYTE [DISTWIDTH] IN BLOOD BY AUTOMATED COUNT: 12.4 % (ref 10–15)
EST. AVERAGE GLUCOSE BLD GHB EST-MCNC: 105 MG/DL
FASTING STATUS PATIENT QL REPORTED: NO
FASTING STATUS PATIENT QL REPORTED: NO
GLUCOSE SERPL-MCNC: 102 MG/DL (ref 70–99)
HBA1C MFR BLD: 5.3 % (ref 0–5.6)
HCO3 SERPL-SCNC: 27 MMOL/L (ref 22–29)
HCT VFR BLD AUTO: 42.2 % (ref 40–53)
HDLC SERPL-MCNC: 56 MG/DL
HGB BLD-MCNC: 14.5 G/DL (ref 13.3–17.7)
LDLC SERPL CALC-MCNC: 124 MG/DL
MCH RBC QN AUTO: 31.3 PG (ref 26.5–33)
MCHC RBC AUTO-ENTMCNC: 34.4 G/DL (ref 31.5–36.5)
MCV RBC AUTO: 91 FL (ref 78–100)
NONHDLC SERPL-MCNC: 139 MG/DL
PLATELET # BLD AUTO: 217 10E3/UL (ref 150–450)
POTASSIUM SERPL-SCNC: 4.4 MMOL/L (ref 3.4–5.3)
PROT SERPL-MCNC: 6.9 G/DL (ref 6.4–8.3)
PSA SERPL DL<=0.01 NG/ML-MCNC: 0.61 NG/ML (ref 0–6.5)
RBC # BLD AUTO: 4.64 10E6/UL (ref 4.4–5.9)
SODIUM SERPL-SCNC: 140 MMOL/L (ref 135–145)
TRIGL SERPL-MCNC: 75 MG/DL
TSH SERPL DL<=0.005 MIU/L-ACNC: 1.86 UIU/ML (ref 0.3–4.2)
WBC # BLD AUTO: 7.5 10E3/UL (ref 4–11)

## 2025-02-10 RX ORDER — TAMSULOSIN HYDROCHLORIDE 0.4 MG/1
0.8 CAPSULE ORAL DAILY
Qty: 180 CAPSULE | Refills: 0 | Status: SHIPPED | OUTPATIENT
Start: 2025-02-10

## 2025-02-10 RX ORDER — METHOCARBAMOL 500 MG/1
TABLET, FILM COATED ORAL
COMMUNITY
Start: 2025-01-21

## 2025-02-10 SDOH — HEALTH STABILITY: PHYSICAL HEALTH: ON AVERAGE, HOW MANY DAYS PER WEEK DO YOU ENGAGE IN MODERATE TO STRENUOUS EXERCISE (LIKE A BRISK WALK)?: 5 DAYS

## 2025-02-10 ASSESSMENT — SOCIAL DETERMINANTS OF HEALTH (SDOH): HOW OFTEN DO YOU GET TOGETHER WITH FRIENDS OR RELATIVES?: MORE THAN THREE TIMES A WEEK

## 2025-02-10 NOTE — PROGRESS NOTES
Preventive Care Visit  [facility] Damari Pascual MD, Internal Medicine  Feb 10, 2025      Assessment & Plan   Problem List Items Addressed This Visit          Nervous and Auditory    Lumbar radiculopathy     S/p b/l L2/3 facet RFA and b/l L1-2 MBB 8/15/24. Also sounds like he had GLENN a few weeks ago. With improvement.   - Continue to follow with Dr. Bundy  - Okay for PRN gabapentin, methocarbamol and/or voltaren         Relevant Medications    methocarbamol (ROBAXIN) 500 MG tablet       Digestive    Class 1 obesity due to excess calories with serious comorbidity and body mass index (BMI) of 33.0 to 33.9 in adult     Associated with HTN. Weight is down today since he has resumed the [employer] program after the holidays.  Also continues on metformin  mg daily.  -Continue current lifestyle modifications and metformin 500 mg daily  -He also plans to follow-up with Dr. Colin this year  -More weight loss may help with BP as well            Circulatory    Essential hypertension     BP above goal today and at home this month. Pain has been up with R leg, which may be contributing. Prior to this was well controlled on current regimen. Discussed we could increase meds today or monitor. He prefers to monitor at home to see if things normalize as R leg/back pain is treated.   - Watch for now continuing amlodipine 7.5 mg, hydrochlorothiazide 12.5 mg, imdur 90 mg and lisinopril 40 mg daily  - He will send MyChart of BP above goal over next 1-2 months  - If still high, would inc amlodipine or hydrochlorothiazide  - F/up 4-6 months         LBBB (left bundle branch block)     New at pre-op 8/2024. He did see EP 8/13/24. TTE 8/2024 showed LVEF 67%. No management changes needed.   - Consider repeat TTE in 1-2 years.             Musculoskeletal and Integumentary    Seborrheic dermatitis     Follows with Dr. Aden, dermatology at .   - Well controlled with Pimecrolimus 1% cream and  "ketonconazole cream.             Urinary    Lower urinary tract symptoms (LUTS)     Nocutria improved with flomax but he was taking double recommended dose. With going down to 0.8 mg nightly, increased symptoms. Discussed adding finasteride. He would like to see urology first, possibly pursue surgical management.   - Urology referral placed  - For now, continue flomax 0.8 mg nightly          Relevant Medications    tamsulosin (FLOMAX) 0.4 MG capsule    Other Relevant Orders    Adult Urology  Referral       Other    Encounter for Medicare annual wellness exam - Primary     We discussed healthy lifestyle, nutrition, cardiovascular risk reduction, self care, safety, sunscreen, and timing of cancer screening.  Health maintenance screening and immunizations reviewed with the patient.    - Update labs today including PSA  - He is up to date on all vaccines  - Colonoscopy due 1/2026         Relevant Orders    Comprehensive metabolic panel    CBC with platelets    Hemoglobin A1c    TSH with free T4 reflex    Lipid panel reflex to direct LDL Non-fasting    Erectile dysfunction, unspecified erectile dysfunction type     Offered prescription for Viagra today. He would like to wait until he sees urology to discuss further.          Relevant Orders    Adult Urology  Referral     Other Visit Diagnoses       Screening for prostate cancer        Relevant Orders    PSA, screen             Patient has been advised of split billing requirements and indicates understanding: Yes       BMI  Estimated body mass index is 33.28 kg/m  as calculated from the following:    Height as of this encounter: 1.791 m (5' 10.5\").    Weight as of this encounter: 106.7 kg (235 lb 4.8 oz).   Weight management plan: Discussed healthy diet and exercise guidelines    The longitudinal plan of care for the diagnosis(es)/condition(s) as documented were addressed during this visit. Due to the added complexity in care, I will continue to support " Austin in the subsequent management and with ongoing continuity of care.    Counseling  Appropriate preventive services were addressed with this patient via screening, questionnaire, or discussion as appropriate for fall prevention, nutrition, physical activity, Tobacco-use cessation, social engagement, weight loss and cognition.  Checklist reviewing preventive services available has been given to the patient.  Reviewed patient's diet, addressing concerns and/or questions.   Patient reported safety concerns were addressed today.Information on urinary incontinence and treatment options given to patient.     FUTURE APPOINTMENTS:       - Follow-up visit in 4-6 months       Subjective   Austin is a 74 year old, presenting for the following:  Annual Visit        2/10/2025    12:50 PM   Additional Questions   Roomed by GREG Sousa    Austin is here for the AWV today. We reviewed chronic and current issues as below:     Lumbar radiculopathy: S/p b/l L2/3 facet RFA and b/l L1-2 MBB 8/15/24. However, ongoing pain, saw Angi about this in Sept and Oct. Some issues after and he did go back in for prednisone injections a few weeks ago.     R leg: chronic issue after a fall. Still lots of spasm. Also seeing Dr. Bundy about this, next visit is in 2 weeks.   - voltaren at night   - methocarbamol available     LBBB: did see EP 8/13/24. TTE 8/2024 showed LVEF 67%. No management changes needed. Consider repeat TTE in 1-2 years.     HTN: current regimen is amlodipine 7.5 mg, hydrochlorothiazide 12.5 mg, imdur 90 mg and lisinopril 40 mg daily  - Home BP in January 130s-140s/90s, then added metholcarbamol and BP lower 110s/80s; stopped and BP creeping back up 140s/90s    BPH: flomax 0.8 mg daily. Was taking up to 1.6 mg daily, Dr. Vance told him to go back to 0.8 mg in Nov. With doing this, getting up to use the bathroom more at night.     Obesity: Metformin  mg daily. Due for follow up with Dr. Colin this year.  After the holidays started [employer] again and has lost 13 lbs!   Wt Readings from Last 4 Encounters:   02/10/25 106.7 kg (235 lb 4.8 oz)   10/07/24 109.6 kg (241 lb 11.2 oz)   09/17/24 107.2 kg (236 lb 6.4 oz)   08/13/24 106.1 kg (234 lb)     Seb derm: sees Dr. Aden at . Last visit 9/10/24. Cont ketoconazole 2% and elidel 1%    HCM: colonoscopy 1/2021, repeat 5 years (fam hx and prep).     Health Care Directive  Patient does not have a Health Care Directive: Discussed advance care planning with patient; information given to patient to review.      2/10/2025   General Health   How would you rate your overall physical health? Excellent   Feel stress (tense, anxious, or unable to sleep) Not at all         2/10/2025   Nutrition   Diet: I don't know         2/10/2025   Exercise   Days per week of moderate/strenous exercise 5 days         2/10/2025   Social Factors   Frequency of gathering with friends or relatives More than three times a week         10/23/2023   Activities of Daily Living- Home Safety   Needs help with the following daily activites NO assistance is needed   Safety concerns in the home No grab bars in the bathroom         2/10/2025   Dental   Dentist two times every year? Yes         10/23/2023   Hearing Screening   Hearing concerns? No concerns     Today's PHQ-2 Score:       2/10/2025     1:01 PM   PHQ-2 ( 1999 Pfizer)   Q1: Little interest or pleasure in doing things 0   Q2: Feeling down, depressed or hopeless 0   PHQ-2 Score 0    Q1: Little interest or pleasure in doing things Not at all   Q2: Feeling down, depressed or hopeless Not at all   PHQ-2 Score 0       Patient-reported         2/10/2025   Substance Use   Alcohol more than 3/day or more than 7/wk No   Do you have a current opioid prescription? No   How severe/bad is pain from 1 to 10? 3/10   Do you use any other substances recreationally? No     Social History     Tobacco Use    Smoking status: Never    Smokeless tobacco: Never    Tobacco  comments:     No history with tobacco   Vaping Use    Vaping status: Never Used   Substance Use Topics    Alcohol use: Yes     Comment: Occasional.   Once or twice   a month.    Drug use: Never     ASCVD Risk   The 10-year ASCVD risk score (Dennis GRIER, et al., 2019) is: 29.6%    Values used to calculate the score:      Age: 74 years      Sex: Male      Is Non- : No      Diabetic: No      Tobacco smoker: No      Systolic Blood Pressure: 153 mmHg      Is BP treated: Yes      HDL Cholesterol: 50 mg/dL      Total Cholesterol: 176 mg/dL    Reviewed and updated as needed this visit by Provider   Tobacco  Allergies  Meds  Problems  Med Hx  Surg Hx  Fam Hx     Sexual Activity          Past Medical History:   Diagnosis Date    Arthritis Not sure    treated with Aspirins    Cancer (H) 2016    Skin Cancers; treated with Surgeries (3); involved back & Scalp    Hypertension 1995    Dr Lilian Gonzales (HP / Retired) diagnosed the High BP     Past Surgical History:   Procedure Laterality Date    APPENDECTOMY  1964    None    BIOPSY  May 2024    Skin CA - neck area    COLONOSCOPY  2020    None    ORTHOPEDIC SURGERY  2016    Modified structure of the ankle; cut and reattached Achilles tendon     Current providers sharing in care for this patient include:  Patient Care Team:  Yusra Pascual MD as PCP - General (Internal Medicine)  Yusra Pascual MD as Assigned PCP  Cindy Lynch AuD as Audiologist (Audiology)  Kimberlyn Zhu MD as Assigned Heart and Vascular Provider    The following health maintenance items are reviewed in Epic and correct as of today:  Health Maintenance   Topic Date Due    ANNUAL REVIEW OF HM ORDERS  Never done    COVID-19 Vaccine (7 - 2024-25 season) 04/07/2025    BMP  08/05/2025    COLORECTAL CANCER SCREENING  01/06/2026    MEDICARE ANNUAL WELLNESS VISIT  02/10/2026    FALL RISK ASSESSMENT  02/10/2026    GLUCOSE  08/05/2027    LIPID   "10/23/2028    ADVANCE CARE PLANNING  02/10/2030    DTAP/TDAP/TD IMMUNIZATION (5 - Td or Tdap) 06/17/2031    HEPATITIS C SCREENING  Completed    PHQ-2 (once per calendar year)  Completed    INFLUENZA VACCINE  Completed    Pneumococcal Vaccine: 50+ Years  Completed    ZOSTER IMMUNIZATION  Completed    RSV VACCINE  Completed    HPV IMMUNIZATION  Aged Out    MENINGITIS IMMUNIZATION  Aged Out       Review of Systems  Constitutional, neuro, ENT, endocrine, pulmonary, cardiac, gastrointestinal, genitourinary, musculoskeletal, integument and psychiatric systems are negative, except as otherwise noted.     Objective    Exam  BP (!) 153/94   Pulse 90   Temp 98.1  F (36.7  C) (Oral)   Resp 16   Ht 1.791 m (5' 10.5\")   Wt 106.7 kg (235 lb 4.8 oz)   SpO2 97%   BMI 33.28 kg/m     Estimated body mass index is 33.28 kg/m  as calculated from the following:    Height as of this encounter: 1.791 m (5' 10.5\").    Weight as of this encounter: 106.7 kg (235 lb 4.8 oz).    Physical Exam  GENERAL: alert and no distress  EYES: Eyes grossly normal to inspection and conjunctivae and sclerae normal  HENT: ear canals and TM's normal, nose and mouth without ulcers or lesions  NECK: no adenopathy, no asymmetry, masses, or scars  RESP: lungs clear to auscultation - no rales, rhonchi or wheezes  CV: regular rate and rhythm, normal S1 S2, no S3 or S4, no murmur, click or rub, no peripheral edema  ABDOMEN: soft, nontender, no hepatosplenomegaly, no masses and bowel sounds normal  MS: no gross musculoskeletal defects noted, no edema  SKIN: no suspicious lesions or rashes on exposed skin   NEURO: No focal deficits, mentation intact and speech normal  PSYCH: mentation appears normal, affect normal/bright         2/10/2025   Mini Cog   Clock Draw Score 2 Normal   3 Item Recall 3 objects recalled   Mini Cog Total Score 5              Signed Electronically by: Yusra Pascual MD    "

## 2025-02-10 NOTE — ASSESSMENT & PLAN NOTE
S/p b/l L2/3 facet RFA and b/l L1-2 MBB 8/15/24. Also sounds like he had GLENN a few weeks ago. With improvement.   - Continue to follow with Dr. Gregor Sifuentes for PRN gabapentin, methocarbamol and/or voltaren

## 2025-02-10 NOTE — ASSESSMENT & PLAN NOTE
Associated with HTN. Weight is down today since he has resumed the [employer] program after the holidays.  Also continues on metformin  mg daily.  -Continue current lifestyle modifications and metformin 500 mg daily  -He also plans to follow-up with Dr. Colin this year  -More weight loss may help with BP as well

## 2025-02-10 NOTE — ASSESSMENT & PLAN NOTE
BP above goal today and at home this month. Pain has been up with R leg, which may be contributing. Prior to this was well controlled on current regimen. Discussed we could increase meds today or monitor. He prefers to monitor at home to see if things normalize as R leg/back pain is treated.   - Watch for now continuing amlodipine 7.5 mg, hydrochlorothiazide 12.5 mg, imdur 90 mg and lisinopril 40 mg daily  - He will send MyChart of BP above goal over next 1-2 months  - If still high, would inc amlodipine or hydrochlorothiazide  - F/up 4-6 months

## 2025-02-10 NOTE — PATIENT INSTRUCTIONS
Patient Education   Preventive Care Advice   This is general advice given by our system to help you stay healthy. However, your care team may have specific advice just for you. Please talk to your care team about your preventive care needs.  Nutrition  Eat 5 or more servings of fruits and vegetables each day.  Try wheat bread, brown rice and whole grain pasta (instead of white bread, rice, and pasta).  Get enough calcium and vitamin D. Check the label on foods and aim for 100% of the RDA (recommended daily allowance).  Lifestyle  Exercise at least 150 minutes each week  (30 minutes a day, 5 days a week).  Do muscle strengthening activities 2 days a week. These help control your weight and prevent disease.  No smoking.  Wear sunscreen to prevent skin cancer.  Have a dental exam and cleaning every 6 months.  Yearly exams  See your health care team every year to talk about:  Any changes in your health.  Any medicines your care team has prescribed.  Preventive care, family planning, and ways to prevent chronic diseases.  Shots (vaccines)   HPV shots (up to age 26), if you've never had them before.  Hepatitis B shots (up to age 59), if you've never had them before.  COVID-19 shot: Get this shot when it's due.  Flu shot: Get a flu shot every year.  Tetanus shot: Get a tetanus shot every 10 years.  Pneumococcal, hepatitis A, and RSV shots: Ask your care team if you need these based on your risk.  Shingles shot (for age 50 and up)  General health tests  Diabetes screening:  Starting at age 35, Get screened for diabetes at least every 3 years.  If you are younger than age 35, ask your care team if you should be screened for diabetes.  Cholesterol test: At age 39, start having a cholesterol test every 5 years, or more often if advised.  Bone density scan (DEXA): At age 50, ask your care team if you should have this scan for osteoporosis (brittle bones).  Hepatitis C: Get tested at least once in your life.  STIs (sexually  transmitted infections)  Before age 24: Ask your care team if you should be screened for STIs.  After age 24: Get screened for STIs if you're at risk. You are at risk for STIs (including HIV) if:  You are sexually active with more than one person.  You don't use condoms every time.  You or a partner was diagnosed with a sexually transmitted infection.  If you are at risk for HIV, ask about PrEP medicine to prevent HIV.  Get tested for HIV at least once in your life, whether you are at risk for HIV or not.  Cancer screening tests  Cervical cancer screening: If you have a cervix, begin getting regular cervical cancer screening tests starting at age 21.  Breast cancer scan (mammogram): If you've ever had breasts, begin having regular mammograms starting at age 40. This is a scan to check for breast cancer.  Colon cancer screening: It is important to start screening for colon cancer at age 45.  Have a colonoscopy test every 10 years (or more often if you're at risk) Or, ask your provider about stool tests like a FIT test every year or Cologuard test every 3 years.  To learn more about your testing options, visit:   .  For help making a decision, visit:   https://bit.ly/su51242.  Prostate cancer screening test: If you have a prostate, ask your care team if a prostate cancer screening test (PSA) at age 55 is right for you.  Lung cancer screening: If you are a current or former smoker ages 50 to 80, ask your care team if ongoing lung cancer screenings are right for you.  For informational purposes only. Not to replace the advice of your health care provider. Copyright   2023 [employer]. All rights reserved. Clinically reviewed by the [facility]. [employer] [id number] - REV 01/24.

## 2025-02-10 NOTE — ASSESSMENT & PLAN NOTE
Offered prescription for Viagra today. He would like to wait until he sees urology to discuss further.

## 2025-02-10 NOTE — ASSESSMENT & PLAN NOTE
We discussed healthy lifestyle, nutrition, cardiovascular risk reduction, self care, safety, sunscreen, and timing of cancer screening.  Health maintenance screening and immunizations reviewed with the patient.    - Update labs today including PSA  - He is up to date on all vaccines  - Colonoscopy due 1/2026

## 2025-02-10 NOTE — ASSESSMENT & PLAN NOTE
Nocutria improved with flomax but he was taking double recommended dose. With going down to 0.8 mg nightly, increased symptoms. Discussed adding finasteride. He would like to see urology first, possibly pursue surgical management.   - Urology referral placed  - For now, continue flomax 0.8 mg nightly

## 2025-02-10 NOTE — ASSESSMENT & PLAN NOTE
New at pre-op 8/2024. He did see EP 8/13/24. TTE 8/2024 showed LVEF 67%. No management changes needed.   - Consider repeat TTE in 1-2 years.

## 2025-02-19 ENCOUNTER — MYC MEDICAL ADVICE (OUTPATIENT)
Dept: INTERNAL MEDICINE | Facility: CLINIC | Age: 75
End: 2025-02-19
Payer: COMMERCIAL

## 2025-02-19 DIAGNOSIS — E78.2 MIXED HYPERLIPIDEMIA: Primary | ICD-10-CM

## 2025-02-20 RX ORDER — ROSUVASTATIN CALCIUM 10 MG/1
10 TABLET, COATED ORAL DAILY
Qty: 90 TABLET | Refills: 1 | Status: SHIPPED | OUTPATIENT
Start: 2025-02-20

## 2025-03-02 ENCOUNTER — HOSPITAL ENCOUNTER (OUTPATIENT)
Dept: CT IMAGING | Facility: CLINIC | Age: 75
Discharge: HOME | End: 2025-03-02
Attending: INTERNAL MEDICINE | Admitting: INTERNAL MEDICINE
Payer: COMMERCIAL

## 2025-03-02 DIAGNOSIS — E78.2 MIXED HYPERLIPIDEMIA: ICD-10-CM

## 2025-03-02 PROCEDURE — 75571 CT HRT W/O DYE W/CA TEST: CPT

## 2025-03-02 PROCEDURE — 75571 CT HRT W/O DYE W/CA TEST: CPT | Mod: 26 | Performed by: INTERNAL MEDICINE

## 2025-03-03 ENCOUNTER — PATIENT OUTREACH (OUTPATIENT)
Dept: ONCOLOGY | Facility: CLINIC | Age: 75
End: 2025-03-03
Payer: COMMERCIAL

## 2025-03-03 DIAGNOSIS — R91.8 PULMONARY NODULES: Primary | ICD-10-CM

## 2025-03-03 LAB
CV CALCIUM SCORE AGATSTON LM: 0
CV CALCIUM SCORING AGATSON LAD: 227
CV CALCIUM SCORING AGATSTON CX: 15
CV CALCIUM SCORING AGATSTON RCA: 586
CV CALCIUM SCORING AGATSTON TOTAL: 828

## 2025-03-03 NOTE — PROGRESS NOTES
New IP (Interventional Pulmonology) referral rec'd.  Chart reviewed.       New Patient: Interventional Pulmonary (Lung nodule) Nurse Navigator Note    [facility]    Referred to (specialty): Interventional Pulmonary (Lung nodule)    Requested provider (if applicable): n/a    Date Referral Received: 3/3/2025    Evaluation for:  Multiple nodules on CT chest 3/2/25    Clinical History (per Nurse review of records provided):    **BOOK MARKED**    OVERREAD: DETAILED [place] [facility] EXTRACARDIAC OVERREAD OF CARDIAC CT   LOCATION: [facility]  DATE: 3/2/2025     INDICATION:  Mixed hyperlipidemia  TECHNIQUE: Dose reduction techniques were used.  COMPARISON: None.     FINDINGS:    LIMITED CHEST: There are a few scattered small 1 to 2 mm subpleural groundglass nodules in the right middle lobe and both lung bases.  LIMITED MEDIASTINUM: Negative.  LIMITED UPPER ABDOMEN: Negative.                                                                      IMPRESSION:    1.  A few scattered small 1 to 2 mm subpleural groundglass nodules in the right middle lobe and both lung bases..  2.  Please refer to cardiologist's dictation for the cardiac CT report.     Records Location: [facility]     Records Needed: none    Additional testing needed prior to consult: CT Chest

## 2025-03-04 ENCOUNTER — MYC MEDICAL ADVICE (OUTPATIENT)
Dept: INTERNAL MEDICINE | Facility: CLINIC | Age: 75
End: 2025-03-04
Payer: COMMERCIAL

## 2025-03-04 NOTE — PROGRESS NOTES
Bariatric Care Clinic Non Surgical Follow up Visit   Date of visit: 3/11/2025  Physician: MARÍA Colin MD, MD  Primary Care is Yusra Pascual.  David Sierra   75 year old  male     Initial Weight: 234#  Initial BMI: 33.17  Today's Weight:   Wt Readings from Last 1 Encounters:   03/11/25 105.7 kg (233 lb 1.6 oz)     Body mass index is 32.97 kg/m .           Assessment and Plan   Assessment: David is a 75 year old year old male who presents for medical weight management.      Plan:    1. Obesity (BMI 30-39.9) (Primary)  Patient was congratulated on his success thus far. Healthy habits to assist with further weight loss were discussed. He will work on increasing his strength training. He will continue to work with [employer]. He will continue the metformin.. We discussed the patient's co-morbid conditions including hypertension. These likely will improve with healthy habits and weight loss.     2. Essential hypertension  This may improve with healthy habits and weight loss.     3. Impaired fasting blood sugar  This may improve with healthy habits and weight loss.      Follow up next available with myself            INTERIM HISTORY  Patient is taking metformin for appetite and craving control and he thinks it is helpful He has spinal stenosis at L5 and are thinking about surgery to take care of that. He had a very high coronary calcium score and will be seeing cardiology. He has lost 16# since starting [employer] in January. He traveled a lot in February and that interfered with his eating plan a bit. He is working with [employer].    DIETARY HISTORY  Meals Per Day: 3  Eating Protein First?: yes  Food Diary: B:oatmeal ([employer]) L:lean and green, generally protein and vegetable soup and fruit , often no vegetable D: Lean and green  Snacks Per Day: 3- protein bars through [employer], occasional pretzels off plan    Fluid Intake: working on it  Portion Control: yes  Calorie Containing Beverages: none  Choosing Whole Grains:  Not discussed   Meals at Restaurant per week:more in February due to traveling due to family illness    Positive Changes Since Last Visit: protein first, water intake, no sugared beverages  Struggling With: off track a bit while needing to travel, exercise is limited    Knowledgeable in Reading Food Labels: yes  Getting Adequate Protein: yes    PHYSICAL ACTIVITY PATTERNS:  Stretching and upper body strength, no cardio. He is working with a . He is struggling with back and leg pain.    REVIEW OF SYSTEMS  GENERAL/CONSTITUTIONAL:  Fatigue: sometimes  CARDIOVASCULAR:  History of heart disease: unsure  GI:  Pancreatitis: Not discussed   NEUROLOGIC:  History of migraine headaches: yes  PSYCHIATRIC:  Moods: stable  MUSCULOSKELETAL/RHEUMATOLOGIC  Arthralgias: yes  Myalgias: yes  ENDOCRINE:  Monitoring Blood Sugars: no  Sugars Well Controlled: na  No personal or family history of medullary thyroid cancer Not discussed   :  Birth control: male       Patient Profile   Social History     Social History Narrative    Not on file        Past Medical History   Past Medical History:   Diagnosis Date    Arthritis Not sure    treated with Aspirins    Cancer (H) 2016    Skin Cancers; treated with Surgeries (3); involved back & Scalp    Hypertension 1995    Dr Lilian Gonzales (HP / Retired) diagnosed the High BP     Patient Active Problem List   Diagnosis    Adenomatous polyp of colon    Essential hypertension    Impaired fasting blood sugar    Encounter for Medicare annual wellness exam    Class 1 obesity due to excess calories with serious comorbidity and body mass index (BMI) of 33.0 to 33.9 in adult    Lower urinary tract symptoms (LUTS)    Decreased hearing of both ears    Seborrheic dermatitis    Lumbosacral spondylosis without myelopathy    Skin lesion    LBBB (left bundle branch block)    Lumbar radiculopathy    Erectile dysfunction, unspecified erectile dysfunction type       Past Surgical History  He has a past  "surgical history that includes orthopedic surgery (2016); appendectomy (1964); biopsy (May 2024); and colonoscopy (2020).     Examination   /72   Ht 1.791 m (5' 10.5\")   Wt 105.7 kg (233 lb 1.6 oz)   BMI 32.97 kg/m    Wt Readings from Last 4 Encounters:   03/11/25 105.7 kg (233 lb 1.6 oz)   02/10/25 106.7 kg (235 lb 4.8 oz)   10/07/24 109.6 kg (241 lb 11.2 oz)   09/17/24 107.2 kg (236 lb 6.4 oz)      BP Readings from Last 3 Encounters:   03/11/25 128/72   02/10/25 (!) 153/94   10/07/24 120/76        GEN: Alert and oriented in no acute distress.   HEENT: mucous membranes moist       Counseling:   We reviewed the important post op bariatric recommendations:  -eating 3 meals daily  -eating protein first, getting >60gm protein daily  -eating slowly, chewing food well  -avoiding/limiting calorie containing beverages  -limiting starchy vegetables and carbohydrates, choosing wheat, not white with breads,   crackers, pastas, ad, bagels, tortillas, rice  -limiting restaurant or cafeteria eating to twice a week or less    We discussed the importance of restorative sleep and stress management in maintaining a healthy weight.  We discussed the National Weight Control Registry healthy weight maintenance strategies and ways to optimize metabolism.  We discussed the importance of physical activity including cardiovascular and strength training in maintaining a healthier weight.    Total time spent on the date of this encounter doing: chart review, review of test results, patient visit, physical exam, education, counseling, developing plan of care and documenting = 33 minutes.         MARÍA Colin MD  [facility]           "

## 2025-03-11 ENCOUNTER — OFFICE VISIT (OUTPATIENT)
Dept: SURGERY | Facility: CLINIC | Age: 75
End: 2025-03-11
Payer: COMMERCIAL

## 2025-03-11 VITALS
HEIGHT: 71 IN | WEIGHT: 233.1 LBS | DIASTOLIC BLOOD PRESSURE: 72 MMHG | SYSTOLIC BLOOD PRESSURE: 128 MMHG | BODY MASS INDEX: 32.63 KG/M2

## 2025-03-11 DIAGNOSIS — E66.09 CLASS 1 OBESITY DUE TO EXCESS CALORIES WITH SERIOUS COMORBIDITY AND BODY MASS INDEX (BMI) OF 33.0 TO 33.9 IN ADULT: ICD-10-CM

## 2025-03-11 DIAGNOSIS — R73.01 IMPAIRED FASTING BLOOD SUGAR: ICD-10-CM

## 2025-03-11 DIAGNOSIS — I10 ESSENTIAL HYPERTENSION: ICD-10-CM

## 2025-03-11 DIAGNOSIS — E66.811 CLASS 1 OBESITY DUE TO EXCESS CALORIES WITH SERIOUS COMORBIDITY AND BODY MASS INDEX (BMI) OF 33.0 TO 33.9 IN ADULT: ICD-10-CM

## 2025-03-11 DIAGNOSIS — E66.9 OBESITY (BMI 30-39.9): Primary | ICD-10-CM

## 2025-03-11 PROCEDURE — 3078F DIAST BP <80 MM HG: CPT | Performed by: FAMILY MEDICINE

## 2025-03-11 PROCEDURE — 99214 OFFICE O/P EST MOD 30 MIN: CPT | Performed by: FAMILY MEDICINE

## 2025-03-11 PROCEDURE — 3074F SYST BP LT 130 MM HG: CPT | Performed by: FAMILY MEDICINE

## 2025-03-11 RX ORDER — METFORMIN HYDROCHLORIDE 500 MG/1
TABLET, EXTENDED RELEASE ORAL
Qty: 270 TABLET | Refills: 1 | Status: SHIPPED | OUTPATIENT
Start: 2025-03-11

## 2025-03-11 NOTE — LETTER
3/11/2025      David Sierra  [address]  [place] [facility] [address]      Dear Colleague,    Thank you for referring your patient, David Sierra, to the [facility] [place]. Please see a copy of my visit note below.    Bariatric Care Clinic Non Surgical Follow up Visit   Date of visit: 3/11/2025  Physician: MARÍA Colin MD, MD  Primary Care is Yusra Pascual.  David Sierra   75 year old  male     Initial Weight: 234#  Initial BMI: 33.17  Today's Weight:   Wt Readings from Last 1 Encounters:   03/11/25 105.7 kg (233 lb 1.6 oz)     Body mass index is 32.97 kg/m .           Assessment and Plan   Assessment: David is a 75 year old year old male who presents for medical weight management.      Plan:    1. Obesity (BMI 30-39.9) (Primary)  Patient was congratulated on his success thus far. Healthy habits to assist with further weight loss were discussed. He will work on increasing his strength training. He will continue to work with [employer]. He will continue the metformin.. We discussed the patient's co-morbid conditions including hypertension. These likely will improve with healthy habits and weight loss.     2. Essential hypertension  This may improve with healthy habits and weight loss.     3. Impaired fasting blood sugar  This may improve with healthy habits and weight loss.      Follow up next available with myself            INTERIM HISTORY  Patient is taking metformin for appetite and craving control and he thinks it is helpful He has spinal stenosis at L5 and are thinking about surgery to take care of that. He had a very high coronary calcium score and will be seeing cardiology. He has lost 16# since starting [employer] in January. He traveled a lot in February and that interfered with his eating plan a bit. He is working with [employer].    DIETARY HISTORY  Meals Per Day: 3  Eating Protein First?: yes  Food Diary: B:oatmeal ([employer]) L:lean and green,  generally protein and vegetable soup and fruit , often no vegetable D: Lean and green  Snacks Per Day: 3- protein bars through [employer], occasional pretzels off plan    Fluid Intake: working on it  Portion Control: yes  Calorie Containing Beverages: none  Choosing Whole Grains: Not discussed   Meals at Restaurant per week:more in February due to traveling due to family illness    Positive Changes Since Last Visit: protein first, water intake, no sugared beverages  Struggling With: off track a bit while needing to travel, exercise is limited    Knowledgeable in Reading Food Labels: yes  Getting Adequate Protein: yes    PHYSICAL ACTIVITY PATTERNS:  Stretching and upper body strength, no cardio. He is working with a . He is struggling with back and leg pain.    REVIEW OF SYSTEMS  GENERAL/CONSTITUTIONAL:  Fatigue: sometimes  CARDIOVASCULAR:  History of heart disease: unsure  GI:  Pancreatitis: Not discussed   NEUROLOGIC:  History of migraine headaches: yes  PSYCHIATRIC:  Moods: stable  MUSCULOSKELETAL/RHEUMATOLOGIC  Arthralgias: yes  Myalgias: yes  ENDOCRINE:  Monitoring Blood Sugars: no  Sugars Well Controlled: na  No personal or family history of medullary thyroid cancer Not discussed   :  Birth control: male       Patient Profile   Social History     Social History Narrative     Not on file        Past Medical History   Past Medical History:   Diagnosis Date     Arthritis Not sure    treated with Aspirins     Cancer (H) 2016    Skin Cancers; treated with Surgeries (3); involved back & Scalp     Hypertension 1995    Dr Lilian Gonzales (HP / Retired) diagnosed the High BP     Patient Active Problem List   Diagnosis     Adenomatous polyp of colon     Essential hypertension     Impaired fasting blood sugar     Encounter for Medicare annual wellness exam     Class 1 obesity due to excess calories with serious comorbidity and body mass index (BMI) of 33.0 to 33.9 in adult     Lower urinary tract symptoms (LUTS)      "Decreased hearing of both ears     Seborrheic dermatitis     Lumbosacral spondylosis without myelopathy     Skin lesion     LBBB (left bundle branch block)     Lumbar radiculopathy     Erectile dysfunction, unspecified erectile dysfunction type       Past Surgical History  He has a past surgical history that includes orthopedic surgery (2016); appendectomy (1964); biopsy (May 2024); and colonoscopy (2020).     Examination   /72   Ht 1.791 m (5' 10.5\")   Wt 105.7 kg (233 lb 1.6 oz)   BMI 32.97 kg/m    Wt Readings from Last 4 Encounters:   03/11/25 105.7 kg (233 lb 1.6 oz)   02/10/25 106.7 kg (235 lb 4.8 oz)   10/07/24 109.6 kg (241 lb 11.2 oz)   09/17/24 107.2 kg (236 lb 6.4 oz)      BP Readings from Last 3 Encounters:   03/11/25 128/72   02/10/25 (!) 153/94   10/07/24 120/76        GEN: Alert and oriented in no acute distress.   HEENT: mucous membranes moist       Counseling:   We reviewed the important post op bariatric recommendations:  -eating 3 meals daily  -eating protein first, getting >60gm protein daily  -eating slowly, chewing food well  -avoiding/limiting calorie containing beverages  -limiting starchy vegetables and carbohydrates, choosing wheat, not white with breads,   crackers, pastas, ad, bagels, tortillas, rice  -limiting restaurant or cafeteria eating to twice a week or less    We discussed the importance of restorative sleep and stress management in maintaining a healthy weight.  We discussed the National Weight Control Registry healthy weight maintenance strategies and ways to optimize metabolism.  We discussed the importance of physical activity including cardiovascular and strength training in maintaining a healthier weight.    Total time spent on the date of this encounter doing: chart review, review of test results, patient visit, physical exam, education, counseling, developing plan of care and documenting = 33 minutes.         MARÍA Colin MD  [facility] " Clinic           I, David Sierra, give verbal consent for a resident to be present in today's visit.     Chriss Nguyen CMA  She/Her      [facility]  Surgery Clinic - [facility] [place]  Weight Management Clinic - [address] [address]    Office: [phone]  Fax: [phone]         Again, thank you for allowing me to participate in the care of your patient.        Sincerely,        MARÍA Colin MD    Electronically signed

## 2025-03-11 NOTE — PROGRESS NOTES
I, David Sierra, give verbal consent for a resident to be present in today's visit.     Chriss Nguyen CMA  She/Her      [facility]  Surgery Clinic - [facility] [place]  Weight Management Clinic - [address] [address]    Office: [phone]  Fax: [phone]

## 2025-03-11 NOTE — PATIENT INSTRUCTIONS

## 2025-03-17 ENCOUNTER — OFFICE VISIT (OUTPATIENT)
Dept: CARDIOLOGY | Facility: CLINIC | Age: 75
End: 2025-03-17
Attending: INTERNAL MEDICINE
Payer: COMMERCIAL

## 2025-03-17 VITALS
BODY MASS INDEX: 32.51 KG/M2 | HEIGHT: 71 IN | RESPIRATION RATE: 16 BRPM | HEART RATE: 96 BPM | SYSTOLIC BLOOD PRESSURE: 120 MMHG | DIASTOLIC BLOOD PRESSURE: 76 MMHG | WEIGHT: 232.2 LBS

## 2025-03-17 DIAGNOSIS — R93.1 ABNORMAL FINDINGS ON DIAGNOSTIC IMAGING OF HEART AND CORONARY CIRCULATION: ICD-10-CM

## 2025-03-17 DIAGNOSIS — R93.1 ELEVATED CORONARY ARTERY CALCIUM SCORE: ICD-10-CM

## 2025-03-17 DIAGNOSIS — I25.84 CORONARY ATHEROSCLEROSIS DUE TO CALCIFIED CORONARY LESION (CODE): ICD-10-CM

## 2025-03-17 DIAGNOSIS — R06.09 DYSPNEA ON EXERTION: ICD-10-CM

## 2025-03-17 DIAGNOSIS — I10 ESSENTIAL HYPERTENSION: ICD-10-CM

## 2025-03-17 DIAGNOSIS — R93.1 ELEVATED CORONARY ARTERY CALCIUM SCORE: Primary | ICD-10-CM

## 2025-03-17 DIAGNOSIS — R06.09 DYSPNEA ON EXERTION: Primary | ICD-10-CM

## 2025-03-17 DIAGNOSIS — E78.2 MIXED HYPERLIPIDEMIA: ICD-10-CM

## 2025-03-17 DIAGNOSIS — R00.8 OTHER ABNORMALITIES OF HEART BEAT: ICD-10-CM

## 2025-03-17 PROCEDURE — G2211 COMPLEX E/M VISIT ADD ON: HCPCS | Performed by: STUDENT IN AN ORGANIZED HEALTH CARE EDUCATION/TRAINING PROGRAM

## 2025-03-17 PROCEDURE — 99204 OFFICE O/P NEW MOD 45 MIN: CPT | Performed by: STUDENT IN AN ORGANIZED HEALTH CARE EDUCATION/TRAINING PROGRAM

## 2025-03-17 PROCEDURE — 3078F DIAST BP <80 MM HG: CPT | Performed by: STUDENT IN AN ORGANIZED HEALTH CARE EDUCATION/TRAINING PROGRAM

## 2025-03-17 PROCEDURE — 3074F SYST BP LT 130 MM HG: CPT | Performed by: STUDENT IN AN ORGANIZED HEALTH CARE EDUCATION/TRAINING PROGRAM

## 2025-03-17 RX ORDER — AMLODIPINE BESYLATE 10 MG/1
10 TABLET ORAL DAILY
Qty: 90 TABLET | Refills: 3 | Status: SHIPPED | OUTPATIENT
Start: 2025-03-17

## 2025-03-17 NOTE — LETTER
3/17/2025    Yusra Pascual MD  [address] [facility] [address]    RE: Daivd Sierra       Dear Colleague,     I had the pleasure of seeing David Sierra in the [facility].  Cardiology Clinic    David Sierra is a 75 year old with a history of hypertension, hyperlipidemia, spinal stenosis, and left bundle branch block who presents for evaluation of shortness of breath and abnormal coronary calcium score.    Austin has had hypertension for a long time and a left bundle branch block.  He underwent stress testing last year that was nondiagnostic.  He has had shortness of breath with moderate exertion such as going up multiple flights for stairs for many years.  It does not seem substantially worse now.  He denies any chest pain, palpitations, syncope, or presyncope.  With his primary care doctor he underwent a coronary calcium score which was extremely elevated.  He presents today for discussion of this.    General: Well appearing, appears stated age.  CV: Normal rate and rhythm. No m/r/g. No JVD.   Pulm: Normal effort. Normal breath sounds.  Lower extremities: No lower extremity edema.    Labs:   Reviewed in epic.  Creatinine 1.07    Hemoglobin 14.5  Platelets 217  Hemoglobin A1c 5.3%    Testing:  Coronary calcium score 3/2025: Total Alis score of 828, 100 percentile for his age and sex.  Nuclear stress 8/12/2024: Nondiagnostic due to left bundle branch block, no inducible ischemia  EKG 8/5/2024: Normal sinus rhythm, left bundle branch block (my interpretation)    Assessment and Plan:    1.  Shortness of breath, abnormal coronary calcium score.  We discussed today multiple opportunities for management including expectant management, stress echo, and coronary angiography.  Given his shortness of breath and concerning calcium score we will plan for stress echo.    2.  Hypertension.  Will simplify his regimen.  Stop hydrochlorothiazide and increase amlodipine to 10  mg daily.  If no significant abnormalities, could stop Imdur.    The longitudinal plan of care for the diagnosis(es)/condition(s) as documented were addressed during this visit. Due to the added complexity in care, I will continue to support Austin in the subsequent management and with ongoing continuity of care.    Return in about 6 months (around 9/17/2025), or if symptoms worsen or fail to improve.    Vincent Torres MD  Interventional Cardiology      Thank you for allowing me to participate in the care of your patient.      Sincerely,     Vincent Torres MD     [facility]  cc:   Yusra Pascual MD  [address] [place] [address]

## 2025-03-17 NOTE — PATIENT INSTRUCTIONS
Will get you set up for a coronary angiography.     STOP hydrochlorothiazide and increase amlodipine to 10 mg daily. Goal blood pressure <130/80 mmHg.

## 2025-03-17 NOTE — PROGRESS NOTES
Cardiology Clinic    David Sierra is a 75 year old with a history of hypertension, hyperlipidemia, spinal stenosis, and left bundle branch block who presents for evaluation of shortness of breath and abnormal coronary calcium score.    Austin has had hypertension for a long time and a left bundle branch block.  He underwent stress testing last year that was nondiagnostic.  He has had shortness of breath with moderate exertion such as going up multiple flights for stairs for many years.  It does not seem substantially worse now.  He denies any chest pain, palpitations, syncope, or presyncope.  With his primary care doctor he underwent a coronary calcium score which was extremely elevated.  He presents today for discussion of this.    General: Well appearing, appears stated age.  CV: Normal rate and rhythm. No m/r/g. No JVD.   Pulm: Normal effort. Normal breath sounds.  Lower extremities: No lower extremity edema.    Labs:   Reviewed in epic.  Creatinine 1.07    Hemoglobin 14.5  Platelets 217  Hemoglobin A1c 5.3%    Testing:  Coronary calcium score 3/2025: Total Alis score of 828, 100 percentile for his age and sex.  Nuclear stress 8/12/2024: Nondiagnostic due to left bundle branch block, no inducible ischemia  EKG 8/5/2024: Normal sinus rhythm, left bundle branch block (my interpretation)    Assessment and Plan:    1.  Shortness of breath, abnormal coronary calcium score.  We discussed today multiple opportunities for management including expectant management, stress echo, and coronary angiography.  Given his shortness of breath and concerning calcium score we will plan for stress echo.    2.  Hypertension.  Will simplify his regimen.  Stop hydrochlorothiazide and increase amlodipine to 10 mg daily.  If no significant abnormalities, could stop Imdur.    The longitudinal plan of care for the diagnosis(es)/condition(s) as documented were addressed during this visit. Due to the added complexity in care, I  will continue to support Austin in the subsequent management and with ongoing continuity of care.    Return in about 6 months (around 9/17/2025), or if symptoms worsen or fail to improve.    Vincent Torres MD  Interventional Cardiology

## 2025-03-18 ENCOUNTER — DOCUMENTATION ONLY (OUTPATIENT)
Dept: PULMONOLOGY | Facility: CLINIC | Age: 75
End: 2025-03-18
Payer: COMMERCIAL

## 2025-03-18 NOTE — PROGRESS NOTES
Pre-visit planning and chart review completed.     NEW patient appointment:    4/3/25 with Dr. Sujey Alexander  4/3/25 CT chest wo contrast    - Never smoker.    CE updated. Medications, allergies, problem list, and immunizations reconciled.

## 2025-03-31 NOTE — TELEPHONE ENCOUNTER
RECORDS RECEIVED FROM:    DATE RECEIVED:    NOTES STATUS DETAILS   OFFICE NOTE from referring provider  Internal    OFFICE NOTE from other cardiologists  Internal Dr. Torres 3/17/25   RECORDS from hospital/ED N/A    MEDICATION LIST Internal    GENERAL CARDIO RECORDS   (ALL APPOINTMENT TYPES)     LABS (CBC,BMP,CMP, TSH) Internal    EKG (STRIPS & REPORTS) Internal 8/5/24   MONITORS (STRIPS & REPORTS) N/A    ECHOS (IMAGES AND REPORTS) Internal 8/29/24   STRESS TESTS (IMAGES AND REPORTS) Internal 8/12/24   cMRI (IMAGES AND REPORTS) N/A    Cardiac cath (IMAGES AND REPORTS) N/A    CT/CTA (IMAGES AND REPORTS) Internal 3/2/25

## 2025-04-06 ENCOUNTER — HEALTH MAINTENANCE LETTER (OUTPATIENT)
Age: 75
End: 2025-04-06

## 2025-04-14 ENCOUNTER — OFFICE VISIT (OUTPATIENT)
Dept: CARDIOLOGY | Facility: CLINIC | Age: 75
End: 2025-04-14
Attending: INTERNAL MEDICINE
Payer: COMMERCIAL

## 2025-04-14 ENCOUNTER — PRE VISIT (OUTPATIENT)
Dept: CARDIOLOGY | Facility: CLINIC | Age: 75
End: 2025-04-14
Payer: COMMERCIAL

## 2025-04-14 VITALS
OXYGEN SATURATION: 98 % | HEART RATE: 86 BPM | WEIGHT: 228.1 LBS | DIASTOLIC BLOOD PRESSURE: 90 MMHG | SYSTOLIC BLOOD PRESSURE: 135 MMHG | BODY MASS INDEX: 32.27 KG/M2

## 2025-04-14 DIAGNOSIS — I25.118 CORONARY ARTERY DISEASE INVOLVING NATIVE CORONARY ARTERY OF NATIVE HEART WITH OTHER FORM OF ANGINA PECTORIS: ICD-10-CM

## 2025-04-14 DIAGNOSIS — R06.02 SOB (SHORTNESS OF BREATH): ICD-10-CM

## 2025-04-14 DIAGNOSIS — I44.7 LBBB (LEFT BUNDLE BRANCH BLOCK): Primary | ICD-10-CM

## 2025-04-14 LAB
ATRIAL RATE - MUSE: 681 BPM
DIASTOLIC BLOOD PRESSURE - MUSE: NORMAL MMHG
INTERPRETATION ECG - MUSE: NORMAL
P AXIS - MUSE: 67 DEGREES
PR INTERVAL - MUSE: NORMAL MS
QRS DURATION - MUSE: 164 MS
QT - MUSE: 432 MS
QTC - MUSE: 501 MS
R AXIS - MUSE: 16 DEGREES
SYSTOLIC BLOOD PRESSURE - MUSE: NORMAL MMHG
T AXIS - MUSE: 103 DEGREES
VENTRICULAR RATE- MUSE: 81 BPM

## 2025-04-14 PROCEDURE — 93005 ELECTROCARDIOGRAM TRACING: CPT

## 2025-04-14 PROCEDURE — G0463 HOSPITAL OUTPT CLINIC VISIT: HCPCS | Performed by: INTERNAL MEDICINE

## 2025-04-14 PROCEDURE — 99204 OFFICE O/P NEW MOD 45 MIN: CPT | Performed by: INTERNAL MEDICINE

## 2025-04-14 PROCEDURE — 3080F DIAST BP >= 90 MM HG: CPT | Performed by: INTERNAL MEDICINE

## 2025-04-14 PROCEDURE — 1126F AMNT PAIN NOTED NONE PRSNT: CPT | Performed by: INTERNAL MEDICINE

## 2025-04-14 PROCEDURE — 3075F SYST BP GE 130 - 139MM HG: CPT | Performed by: INTERNAL MEDICINE

## 2025-04-14 RX ORDER — POTASSIUM CHLORIDE 1500 MG/1
40 TABLET, EXTENDED RELEASE ORAL
OUTPATIENT
Start: 2025-04-14

## 2025-04-14 RX ORDER — LIDOCAINE 40 MG/G
CREAM TOPICAL
OUTPATIENT
Start: 2025-04-14

## 2025-04-14 RX ORDER — SODIUM CHLORIDE 9 MG/ML
INJECTION, SOLUTION INTRAVENOUS CONTINUOUS
OUTPATIENT
Start: 2025-04-14

## 2025-04-14 RX ORDER — ASPIRIN 81 MG/1
243 TABLET, CHEWABLE ORAL ONCE
OUTPATIENT
Start: 2025-04-14

## 2025-04-14 RX ORDER — POTASSIUM CHLORIDE 1500 MG/1
20 TABLET, EXTENDED RELEASE ORAL
OUTPATIENT
Start: 2025-04-14

## 2025-04-14 RX ORDER — ASPIRIN 325 MG
325 TABLET ORAL ONCE
OUTPATIENT
Start: 2025-04-14 | End: 2025-04-14

## 2025-04-14 ASSESSMENT — PAIN SCALES - GENERAL: PAINLEVEL_OUTOF10: NO PAIN (0)

## 2025-04-14 NOTE — PROGRESS NOTES
UROLOGY CLINIC VISIT    Chief Complaint:   Erectile dysfunction, LUTS/Nocturia    Referring Provider:  Yusra Pascual    Subjective:     David Sierra is a 75 year old male with a PMHx significant for those items listed, who is presenting to clinic today to discuss erectile dysfunction and LUTS/Nocturia.    Nocturia 4-5/night. During day has frequency every 2-3 hours. Good stream per report, mild dribbling no retention or bleeding. He has been on BID flomax seemed to improved symptoms.   He takes hydrochlorothiazide at night. Does not have a significant degree of reported ANALI or peripheral edema.   He does drink fluid close to bedtime, coffee drinker during day bladder irritant wise.    He also has ED, currently he is more interested in fixing urinary issues. He has not really tried medications for ED  PD/Curvature none present     Reviewed cardiology, pain medicine notes, Dr. Pascual notes. Has a very high CAC score 828 100th percentile. METS appropriate though does get fatigued quickly. On nitrates for Chest pain and cardiac concerns.   Has upcoming Angiogram with cardiology and Lumbar Surgery     Currently the patient has no fever, chills, nausea, vomiting or other signs/symptoms suggestive of acute systemic infection.    PMH  Past Medical History:   Diagnosis Date    Arthritis Not sure    treated with Aspirins    Cancer (H) 2016    Skin Cancers; treated with Surgeries (3); involved back & Scalp    Hypertension     Dr Lilian Gonzales (HP / Retired) diagnosed the High BP     PSH  Past Surgical History:   Procedure Laterality Date    APPENDECTOMY      None    BIOPSY  May 2024    Skin CA - neck area    COLONOSCOPY      None    ORTHOPEDIC SURGERY      Modified structure of the ankle; cut and reattached Achilles tendon     FAMHx  Family History   Problem Relation Age of Onset    Cerebrovascular Disease Mother          from CVA complications ()    Thyroid Disease Mother         Life  "long    Hypertension Father     Colon Cancer Father         1980; treated surgically    Obesity Father         Life long    Diabetes Father         Adult onset     ALLERGY  No Known Allergies  MEDICATIONS  has a current medication list which includes the following prescription(s): amlodipine, fish oil-omega-3 fatty acids, hydrochlorothiazide, isosorbide mononitrate, isosorbide mononitrate, ketoconazole, ketoconazole, lisinopril, metformin, methocarbamol, pimecrolimus, rosuvastatin, and tamsulosin.  ROS:  Constitutional: negative  Eyes: negative  Ears/Nose/Throat/Mouth: negative  Respiratory: negative  Cardiovascular: negative  Gastrointestinal: negative  Genito-Urinary: as documented above in HPI  Musculoskeletal: negative  Neurological: negative  Integumentary: negative      Objective:     /73 (BP Location: Left arm, Patient Position: Sitting, Cuff Size: Adult Large)   Pulse 80   Temp 97.9  F (36.6  C) (Temporal)    Physical Exam:  GENERAL: Patient is AOx3, in no acute distress  CARDIAC: regular rate  LUNG: breathing non labored  ABD: soft, nondistended  : normal circumcised phallus, testes descended bilaterally  No palpable peyronie's plaque though proximal induration of cord   Prostate: Defer at request of patient       LABORATORY:  No results found for: \"CREATININE\"  Lab Results   Component Value Date    PSA 0.61 02/10/2025    PSA 0.42 10/23/2023       Most Recent Urinalysis:  Recent Labs   Lab Test 04/15/25  1104   COLOR Yellow   APPEARANCE Clear   URINEGLC Negative   URINEBILI Negative   URINEKETONE Trace*   SG 1.010   UBLD Negative   URINEPH 6.5   PROTEIN Negative   UROBILINOGEN 1.0   NITRITE Negative   LEUKEST Negative         Post void residual (ml) April 14, 2025: 12    The 10-year ASCVD risk score (Dennis GRIER, et al., 2019) is: 19.1%    Values used to calculate the score:      Age: 75 years      Sex: Male      Is Non- : No      Diabetic: No      Tobacco smoker: No      " Systolic Blood Pressure: 122 mmHg      Is BP treated: Yes      HDL Cholesterol: 57 mg/dL      Total Cholesterol: 132 mg/dL    Assessment:     David Sierra is a 75 year old male with a PMHx significant for those items listed, who is presenting to clinic today to discuss erectile dysfunction, LUTS, Nocturia.    Plan:      Lower urinary tract symptoms (LUTS)  Erectile dysfunction, unspecified erectile dysfunction type  Nocturia    We discussed the pathophysiology of erectile dysfunction and the potential causes including psychogenic, neurogenic, vasculogenic, endocrine, and iatrogenic causes. We discussed the different treatment options including PDE 5 inhibitors, vacuum erection device, intraurethral suppository, intracavernosal injection therapy, and penile implants. We discussed the risks and benefits of each and potential side effects of treatment.   PDE 5 inhibitors: headaches, stuffy nose, visual disturbances, myalgias, heartburn, priapism. He was given instructions to take the medications 1-3 hrs prior to sexual activity and on an empty stomach  CHRISTIANO: pain, bruising, hematoma, numbness  Intraurethral suppository: pain, urethral burning, light headedness, priapism  Intracavernosal injection: pain, bruising, priapism, build up of scar tissue  IPP: pain, infection bleeding, injury to surrounding structures, possible revision surgery, possible anesthetic complications, possible device failure, possible erosion. Reviewed with patient the important of life style modifications, and exercise to help with the treatment of ED. Discussed the need to follow up with the primary care physician to evaluate and treat cardiovascular disease risk factors. The inflated penile implant length will be the length of the stretched flaccid penis. I showed him this in clinic today.  We discussed some of the risks and benefits including the risk of infection of an implant (1-2% requiring removal of the device and salvage antibiotic  treatment with replacement of a new device, the latter being successful 50-85% of time); bleeding (small risk of scrotal hematoma and swelling); mechanical failure (5% at 5 years, significantly greater by 10-15 years); penile shortening (although we will put in as long a device as the penis can accommodate); adjacent organ injury (less than 1% risk of bladder or urethral injury, each of which generally require termination of the implant procedure and return on another visit); as well as the general risks of surgery in an older man including risks of pneumonia, stroke deep venous thrombosis (DVT), and the like. I also discussed patient and partner satisfaction, and I explained the satisfaction rate from clinical trials including patient and partner satisfaction levels in the 75-90% range.    Lower urinary viktoria symptoms   Urinary frequency, mild urinary urgency   Nocturia > 2   - Main concern at the moment is urinary symptoms. Discussed lifestyle changes including water intake as well as bladder irritant management. He will implement these.   - Regarding nocturia, he takes diuretic hydrochlorothiazide before bedtime. This is likely contributing to nocturia, if safe and ok with PCP/cardiology recommend taking hydrochlorothiazide in the AM to avoid diuretic effect immediately prior to bedtime   - Cont BID Flomax   - Appropriate PVRs, discussed trial of bladder antispasmodic medication, he is not currently interested in starting another medication at the moment and would like to attempt lifestyle and diuretic changes prior    - He has upcoming lumbar spinal surgery, counseled continuing alpha blockers periop and post op. Also minimizing opiods and pain medications as much as possible to prevent retention. Also reasonable to see how his urinary symptoms are after his spinal procedure.     ED, arteriogenic   - Currently with essentially daily nitrates PDE5s contraindicated. Agreed that we will see what the results of his  Angiogram are next week and follow up. If revascularized and off consistent nitrates can consider adding on PDE5 per [place] guidelines     ROV after implementing lifestyle changes and after his coming procedures      45 minutes spent on the date of the encounter doing (chart review/review of outside records/review of test results/interpretation of tests/reviewing imaging/patient visit/documentation/discussion with other provider(s)/discussion with family.    Tone Burns MD

## 2025-04-14 NOTE — CONFIDENTIAL NOTE
HPI: I had the privilege to evaluate and examine Mr David Sierra, who is a 75 yr male patient with hyperlipidemia, hypertension and diabetes type 2.    Patient is less physically active - has no shortness of breath nor angina.    Patient underwent a coronary CT which showed following results:  Total coronary calcium score 828 (, LCX 15, ) places the individual in the 100th percentile when compared to an age and gender matched control group and implies a very high risk of cardiac events in the next ten years.       PAST MEDICAL HISTORY:  Past Medical History:   Diagnosis Date    Arthritis Not sure    treated with Aspirins    Cancer (H) 2016    Skin Cancers; treated with Surgeries (3); involved back & Scalp    Hypertension 1995    Dr Lilian Gonzales (HP / Retired) diagnosed the High BP       CURRENT MEDICATIONS:  Current Outpatient Medications   Medication Sig Dispense Refill    amLODIPine (NORVASC) 5 MG tablet Take 1 tablet (5 mg) by mouth daily. (Patient taking differently: Take 15 mg by mouth daily.) 90 tablet 3    fish oil-omega-3 fatty acids 1000 MG capsule Take 2,000 mg by mouth      hydroCHLOROthiazide 12.5 MG tablet Take 1 tablet (12.5 mg) by mouth daily. 90 tablet 3    isosorbide mononitrate (IMDUR) 30 MG 24 hr tablet Take 1 tablet (30 mg) by mouth daily. 90 tablet 3    isosorbide mononitrate (IMDUR) 60 MG 24 hr tablet Take 1 tablet (60 mg) by mouth daily. 90 tablet 3    ketoconazole (NIZORAL) 2 % external cream Apply to face once daily on Monday, Wednesday and Friday. Mix 1:1 with Elidel      ketoconazole (NIZORAL) 2 % external shampoo 2 mLs      lisinopril (ZESTRIL) 40 MG tablet Take 1 tablet (40 mg) by mouth daily. 90 tablet 1    metFORMIN (GLUCOPHAGE XR) 500 MG 24 hr tablet TAKE THREE TABLETS BY MOUTH ONCE DAILY WITH A MEAL 270 tablet 1    pimecrolimus (ELIDEL) 1 % external cream 1 g      rosuvastatin (CRESTOR) 10 MG tablet Take 1 tablet (10 mg) by mouth daily. 90 tablet 1    tamsulosin  (FLOMAX) 0.4 MG capsule Take 2 capsules (0.8 mg) by mouth daily. 180 capsule 0    amLODIPine (NORVASC) 10 MG tablet Take 1 tablet (10 mg) by mouth daily. 90 tablet 3    methocarbamol (ROBAXIN) 500 MG tablet  (Patient not taking: Reported on 2025)         PAST SURGICAL HISTORY:  Past Surgical History:   Procedure Laterality Date    APPENDECTOMY      None    BIOPSY  May 2024    Skin CA - neck area    COLONOSCOPY      None    ORTHOPEDIC SURGERY      Modified structure of the ankle; cut and reattached Achilles tendon       ALLERGIES   No Known Allergies    FAMILY HISTORY:  Family History   Problem Relation Age of Onset    Cerebrovascular Disease Mother          from CVA complications ()    Thyroid Disease Mother         Life long    Hypertension Father     Colon Cancer Father         ; treated surgically    Obesity Father         Life long    Diabetes Father         Adult onset       SOCIAL HISTORY:  Social History     Socioeconomic History    Marital status:      Spouse name: None    Number of children: None    Years of education: None    Highest education level: None   Tobacco Use    Smoking status: Never    Smokeless tobacco: Never    Tobacco comments:     No history with tobacco   Vaping Use    Vaping status: Never Used   Substance and Sexual Activity    Alcohol use: Yes     Comment: Occasional.   Once or twice   a month.    Drug use: Never    Sexual activity: Yes     Partners: Female     Birth control/protection: Post-menopausal, Male Surgical   Other Topics Concern    Parent/sibling w/ CABG, MI or angioplasty before 65F 55M? No     Social Drivers of Health     Financial Resource Strain: Low Risk  (2/10/2025)    Financial Resource Strain     Within the past 12 months, have you or your family members you live with been unable to get utilities (heat, electricity) when it was really needed?: No   Food Insecurity: Low Risk  (2/10/2025)    Food Insecurity     Within the past 12  months, did you worry that your food would run out before you got money to buy more?: No     Within the past 12 months, did the food you bought just not last and you didn t have money to get more?: No   Transportation Needs: Low Risk  (2/10/2025)    Transportation Needs     Within the past 12 months, has lack of transportation kept you from medical appointments, getting your medicines, non-medical meetings or appointments, work, or from getting things that you need?: No   Physical Activity: Unknown (2/10/2025)    Exercise Vital Sign     Days of Exercise per Week: 5 days   Stress: No Stress Concern Present (2/10/2025)    [place] [employer] - Occupational Stress Questionnaire     Feeling of Stress : Not at all   Social Connections: Unknown (2/10/2025)    Social Connection and Isolation Panel [NHANES]     Frequency of Social Gatherings with Friends and Family: More than three times a week   Interpersonal Safety: Low Risk  (2/10/2025)    Interpersonal Safety     Do you feel physically and emotionally safe where you currently live?: Yes     Within the past 12 months, have you been hit, slapped, kicked or otherwise physically hurt by someone?: No     Within the past 12 months, have you been humiliated or emotionally abused in other ways by your partner or ex-partner?: No   Housing Stability: Low Risk  (2/10/2025)    Housing Stability     Do you have housing? : Yes     Are you worried about losing your housing?: No       ROS:   Constitutional: No fever, chills, or sweats. No weight gain/loss   ENT: No visual disturbance, ear ache, epistaxis, sore throat  Allergies/Immunologic: Negative.   Respiratory: No cough, hemoptysia  Cardiovascular: As per HPI  GI: No nausea, vomiting, hematemesis, melena, or hematochezia  : No urinary frequency, dysuria, or hematuria  Integument: Negative  Psychiatric: Negative  Neuro: Negative  Endocrinology: Negative   Musculoskeletal: Negative    EXAM:  /90 (BP  Location: Right arm, Patient Position: Chair, Cuff Size: Adult Regular)   Pulse 86   Wt 103.5 kg (228 lb 1.6 oz)   SpO2 98%   BMI 32.27 kg/m     136/88  In general, the patient is a pleasant male in no apparent distress.    HEENT: NC/AT.  PERRLA.  EOMI.  Sclerae white, not injected.  Nares clear.  Pharynx without erythema or exudate.  Dentition intact.    Neck: No adenopathy.  No thyromegaly. Carotids +4/4 bilaterally without bruits.  No jugular venous distension.   Heart: RRR. Normal S1, S2 splits physiologically. No murmur, rub, click, or gallop. The PMI is in the 5th ICS in the midclavicular line. There is no heave.    Lungs: CTA.  No ronchi, wheezes, rales.  No dullness to percussion.   Abdomen: Soft, nontender, nondistended. No organomegaly.  No bruits.   Extremities: No clubbing, cyanosis, or edema.  The pulses are +4/4 at the radial, brachial, femoral, popliteal, DP, and PT sites bilaterally.  No bruits are noted.  Neurologic: Alert and oriented to person/place/time, normal speech, gait and affect  Skin: No petechiae, purpura or rash.    Labs:  LIPID RESULTS:  Lab Results   Component Value Date    CHOL 132 04/11/2025    HDL 57 04/11/2025    LDL 65 04/11/2025    LDL 66 04/11/2025    TRIG 51 04/11/2025    NHDL 75 04/11/2025     Lpa 86 mg/dl (normal 30 mg/dl)    LIVER ENZYME RESULTS:  Lab Results   Component Value Date    AST 18 02/10/2025    ALT 9 02/10/2025       CBC RESULTS:  Lab Results   Component Value Date    WBC 7.5 02/10/2025    RBC 4.64 02/10/2025    HGB 14.5 02/10/2025    HCT 42.2 02/10/2025    MCV 91 02/10/2025    MCH 31.3 02/10/2025    MCHC 34.4 02/10/2025    RDW 12.4 02/10/2025     02/10/2025       BMP RESULTS:  Lab Results   Component Value Date     02/10/2025    POTASSIUM 4.4 02/10/2025    CHLORIDE 101 02/10/2025    CO2 27 02/10/2025    ANIONGAP 12 02/10/2025     (H) 02/10/2025    BUN 18.3 02/10/2025    CR 1.07 02/10/2025    GFRESTIMATED 74 02/10/2025    BRIANNA 9.8 02/10/2025         A1C RESULTS:  Lab Results   Component Value Date    A1C 5.3 02/10/2025         Procedures:    EKG: sin rhythm, LBBB      Using the Coronary Artery Calcium Score    10 Year risk of a CHD Event Coronary Age Difference from Chronologic Age  21.5%    89        +15  Without Considering the Coronary Artery Calcium Score    10 Year risk of a CHD Event Coronary Age Difference from Chronologic Age  15.7%    82           +7      Assessment and Plan:     I discussed the the results with patient.  Taking into consideration that he has several CVD risk factors  and moreover that his CT CAC percentile is 100 % -  I discussed with patient to perform a Coronary angiogram - he does like physical activity than before.   I would also add aspirin 81 mg/d.      Isaak Hernandez MD, PhD  Professor of Medicine  Division of Cardiology   [facility]  Patient Care Team:  Yusra Pascual MD as PCP - General (Internal Medicine)  Yusra Pascual MD as Assigned PCP  Cindy Lynch AuD as Audiologist (Audiology)  Tone Burns MD as Physician (Urology)  Sujey Alexander MD as MD (Pulmonary & Critical Care Medicine)  Vincent Torres MD as Assigned Heart and Vascular Provider  Isaak Hernandez MD as MD (Cardiovascular Disease)  SELF, REFERRED

## 2025-04-14 NOTE — LETTER
4/14/2025      RE: David Sierra  [address]  [facility] [address]       Dear Colleague,    Thank you for the opportunity to participate in the care of your patient, David Sierra, at the [facility]. Please see a copy of my visit note below.    No notes on file    Please do not hesitate to contact me if you have any questions/concerns.     Sincerely,     Isaak Hernandez MD

## 2025-04-14 NOTE — PATIENT INSTRUCTIONS
April 14, 2025    Cardiology Provider You Saw During Your Visit: Dr. Hernandez      Medication Changes: None      Follow Up:   Angiogram. Please review instructions below.     --------------------------------------------------------------------------------------------  Pre-procedure instructions - Coronary Angiogram  Patient Education    Your arrival time is TBD.  Location is [address] Waiting Room  Please plan on being at the hospital all day. If you are on dialysis, DO NOT schedule on a dialysis day.  At any time, emergencies and/or urgent cases may come up which could delay the start of your procedure.    Pre-procedure instructions - Coronary Angiogram  Shower in the evening before or the morning of the procedure  No solid food for 8 hours prior and nothing to drink 2 hours prior to arrival time  You can take your morning medications (except for diabetic and blood thinners) with sips of water.  Take 325 mg of Aspirin the night before and the morning of your procedure.  You will need to arrange a ride to drop you off and , as you will be unable to drive home. Prior to discharge you may be required to lay flat for approximately 2-6 hours in the recovery unit to ensure proper clotting of the artery. Please note: You cannot take an Uber/Taxi/etc unless you are accompanied by someone.You will need a responsible adult to stay with you for 24 hours post-procedure.              Diabetic Medication Instructions  Hold oral diabetic medication in morning of your procedure and for 48 hours after IV contrast is given  Typical instructions for insulin diabetic medication holding are below. However, please reach out to your Primary Care Provider or Endocrinologist for specific instructions  DO NOT take any oral diabetic medication, short-acting diabetes medications/insulin, humalog or regular insulin the morning of your test  Take    dose of long-acting insulin (Lantus, Levemir) the day of your test  Remember to bring your glucometer and insulin with you to take after your test if needed  GLP-1 Agonists Instructions  DO NOT take injectable GLP-1 agonists semaglutide (Ozempic, Wegovy), dulaglutide (Trulicity), exenatide ER (Bydureon), tirzepatide (Mounjaro), or oral semaglutide (Rybelsus) for 7 days prior your procedure  Hold once daily injectable GLP-1 agonists exenatide (Byetta), liraglutide (Saxenda, Victoza), lixisenatide (Soligua) the day before and day of your procedure                  Anticoagulation Medication Instructions   NA  Write N/A if not currently taking    You will need to follow up with one of our cardiology APPs 1-2 weeks after your procedure. If you need help scheduling or rescheduling your appointment, please call [phone]  --------------------------------------------------------------------------------------------    Labs:    Latest Reference Range & Units 02/10/25 13:52 02/20/25 09:54 03/02/25 13:15 04/11/25 09:39   Sodium 135 - 145 mmol/L 140      Potassium 3.4 - 5.3 mmol/L 4.4      Chloride 98 - 107 mmol/L 101      Carbon Dioxide (CO2) 22 - 29 mmol/L 27      Urea Nitrogen 8.0 - 23.0 mg/dL 18.3      Creatinine 0.67 - 1.17 mg/dL 1.07      GFR Estimate >60 mL/min/1.73m2 74      Calcium 8.8 - 10.4 mg/dL 9.8      Anion Gap 7 - 15 mmol/L 12      Albumin 3.5 - 5.2 g/dL 4.3      Protein Total 6.4 - 8.3 g/dL 6.9      Alkaline Phosphatase 40 - 150 U/L 71      ALT 0 - 70 U/L 9      AST 0 - 45 U/L 18      Bilirubin Total <=1.2 mg/dL 0.5      Cholesterol <200 mg/dL 195   132   Patient Fasting?  No  No   Yes   Glucose 70 - 99 mg/dL 102 (H)      HDL Cholesterol >=40 mg/dL 56   57   Estimated Average Glucose <117 mg/dL 105      Hemoglobin A1C 0.0 - 5.6 % 5.3      LDL Cholesterol Calculated <100 mg/dL 124 (H)   65   LDL Cholesterol Direct <100 mg/dL    66   Lipoprotein (a) <30 mg/dL    86 (H)   Non HDL Cholesterol <130 mg/dL 139 (H)    75   Prostate Specific Antigen Screen 0.00 - 6.50 ng/mL 0.61      Triglycerides <150 mg/dL 75   51   TSH 0.30 - 4.20 uIU/mL 1.86      WBC 4.0 - 11.0 10e3/uL 7.5      Hemoglobin 13.3 - 17.7 g/dL 14.5      Hematocrit 40.0 - 53.0 % 42.2      Platelet Count 150 - 450 10e3/uL 217      RBC Count 4.40 - 5.90 10e6/uL 4.64      MCV 78 - 100 fL 91      MCH 26.5 - 33.0 pg 31.3      MCHC 31.5 - 36.5 g/dL 34.4      RDW 10.0 - 15.0 % 12.4      RADIOLOGIST CONSULT FOR CARDIOLOGY    Rpt    XR LUMBAR FLEX/EXT 2/3 VIEWS   Rpt (E)     CT CALCIUM SCREENING    Rpt    (H): Data is abnormally high  Rpt: View report in Results Review for more information  (E): External lab result        Follow the American Heart Association Diet and Lifestyle Recommendations:  -Limit saturated fat, trans fat, sodium, red meat, sweets and sugar-sweetened beverages. If you choose to eat red meat, compare labels and select the leanest cuts available.  -Aim for at least 150 minutes of moderate physical activity or 75 minutes of vigorous physical activity - or an equal combination of both - each week.      To Reach Us:  -During business hours: [phone], press option # 1 to schedule an appointment or to leave a message for your care team.     -After hours, weekends or holidays: [phone], press option #4 and ask to speak to the on-call cardiologist. Inform them you are a patient at the [facility].        **If you have a cardiac device, please make sure you schedule an in-person device check just prior to your cardiology provider appointments**        Sruthi Brandt RN  Cardiology Care Coordinator - General Cardiology  [facility]

## 2025-04-15 ENCOUNTER — OFFICE VISIT (OUTPATIENT)
Dept: UROLOGY | Facility: CLINIC | Age: 75
End: 2025-04-15
Attending: INTERNAL MEDICINE
Payer: COMMERCIAL

## 2025-04-15 VITALS — HEART RATE: 80 BPM | TEMPERATURE: 97.9 F | DIASTOLIC BLOOD PRESSURE: 73 MMHG | SYSTOLIC BLOOD PRESSURE: 122 MMHG

## 2025-04-15 DIAGNOSIS — N52.9 ERECTILE DYSFUNCTION, UNSPECIFIED ERECTILE DYSFUNCTION TYPE: ICD-10-CM

## 2025-04-15 DIAGNOSIS — R35.1 NOCTURIA: Primary | ICD-10-CM

## 2025-04-15 DIAGNOSIS — R39.9 LOWER URINARY TRACT SYMPTOMS (LUTS): ICD-10-CM

## 2025-04-15 LAB
ALBUMIN UR-MCNC: NEGATIVE MG/DL
APPEARANCE UR: CLEAR
BILIRUB UR QL STRIP: NEGATIVE
COLOR UR AUTO: YELLOW
GLUCOSE UR STRIP-MCNC: NEGATIVE MG/DL
HGB UR QL STRIP: NEGATIVE
KETONES UR STRIP-MCNC: ABNORMAL MG/DL
LEUKOCYTE ESTERASE UR QL STRIP: NEGATIVE
NITRATE UR QL: NEGATIVE
PH UR STRIP: 6.5 [PH] (ref 5–8)
SP GR UR STRIP: 1.01 (ref 1–1.03)
UROBILINOGEN UR STRIP-ACNC: 1 E.U./DL

## 2025-04-15 PROCEDURE — 3078F DIAST BP <80 MM HG: CPT | Performed by: STUDENT IN AN ORGANIZED HEALTH CARE EDUCATION/TRAINING PROGRAM

## 2025-04-15 PROCEDURE — 3074F SYST BP LT 130 MM HG: CPT | Performed by: STUDENT IN AN ORGANIZED HEALTH CARE EDUCATION/TRAINING PROGRAM

## 2025-04-15 PROCEDURE — 81003 URINALYSIS AUTO W/O SCOPE: CPT | Performed by: STUDENT IN AN ORGANIZED HEALTH CARE EDUCATION/TRAINING PROGRAM

## 2025-04-15 PROCEDURE — 1126F AMNT PAIN NOTED NONE PRSNT: CPT | Performed by: STUDENT IN AN ORGANIZED HEALTH CARE EDUCATION/TRAINING PROGRAM

## 2025-04-15 PROCEDURE — 99204 OFFICE O/P NEW MOD 45 MIN: CPT | Performed by: STUDENT IN AN ORGANIZED HEALTH CARE EDUCATION/TRAINING PROGRAM

## 2025-04-15 ASSESSMENT — PAIN SCALES - GENERAL: PAINLEVEL_OUTOF10: NO PAIN (0)

## 2025-04-15 NOTE — PROGRESS NOTES
Patient educated regarding bladder scan procedure which writer performed.  Patient voiced understanding of information.  12ml of urine remaining in bladder.

## 2025-04-16 ENCOUNTER — TELEPHONE (OUTPATIENT)
Dept: CARDIOLOGY | Facility: CLINIC | Age: 75
End: 2025-04-16
Payer: COMMERCIAL

## 2025-04-16 NOTE — TELEPHONE ENCOUNTER
Health Call Center    Phone Message    May a detailed message be left on voicemail: yes     Reason for Call: Other: Patient needs to re-schedule angiogram from 4/22/25 date. Please call him back to re-schedule.      Action Taken: Other: cardiology    Travel Screening: Not Applicable   Thank you!  [facility]      Date of Service:

## 2025-04-17 NOTE — TELEPHONE ENCOUNTER
Patient procedure date rescheduled to 5-5-25. Encounter routed to scheduling team for assistance making follow up CORE appointment.     EDMUNDO Ayoub, RN  RN Care Coordinator - General Cardiology   [facility] [place] [facility]

## 2025-04-17 NOTE — TELEPHONE ENCOUNTER
Patient Contacted for the patient to call back and schedule the following:    Appointment type: RTN JASON  Provider:  VANNESSA  Return date: 5/29/2025  Specialty phone number: [phone] OPT 1  Additional appointment(s) needed: LABS PRIOR  Additonal Notes: N/A

## 2025-04-21 DIAGNOSIS — I25.10 CAD (CORONARY ARTERY DISEASE): Primary | ICD-10-CM

## 2025-04-21 RX ORDER — ASPIRIN 81 MG/1
81 TABLET ORAL DAILY
COMMUNITY
Start: 2025-04-21

## 2025-04-24 ENCOUNTER — DOCUMENTATION ONLY (OUTPATIENT)
Dept: PULMONOLOGY | Facility: CLINIC | Age: 75
End: 2025-04-24
Payer: COMMERCIAL

## 2025-04-24 NOTE — PROGRESS NOTES
Pre-visit planning and chart review completed.     NEW patient appointment:    5/1 with Dr. Sujey Alexander  5/1 CT chest wo contrast    - On Aspirin  - Never smoker.    CE updated. Medications, allergies, problem list, and immunizations reconciled.

## 2025-04-28 PROBLEM — R91.8 PULMONARY NODULES: Status: ACTIVE | Noted: 2025-04-28

## 2025-05-01 ENCOUNTER — MYC MEDICAL ADVICE (OUTPATIENT)
Dept: CARDIOLOGY | Facility: CLINIC | Age: 75
End: 2025-05-01

## 2025-05-01 ENCOUNTER — TELEPHONE (OUTPATIENT)
Dept: CARDIOLOGY | Facility: CLINIC | Age: 75
End: 2025-05-01

## 2025-05-01 NOTE — TELEPHONE ENCOUNTER
**Called pt to re-review angiogram instructions over the phone. No answer. LVM instructing pt to return call to clinic. Will also send instructions to pt again via [place].       Pre-procedure instructions - Coronary Angiogram  Patient Education    Your arrival time is 11 am on 5/5.  Location is [address] Waiting Room  Please plan on being at the hospital all day. If you are on dialysis, DO NOT schedule on a dialysis day.  At any time, emergencies and/or urgent cases may come up which could delay the start of your procedure.    Pre-procedure instructions - Coronary Angiogram  Shower in the evening before or the morning of the procedure  No solid food for 8 hours prior and nothing to drink 2 hours prior to arrival time  You can take your morning medications (except for diabetic and blood thinners) with sips of water.  Take 325 mg of Aspirin the night before and the morning of your procedure.  You will need to arrange a ride to drop you off and , as you will be unable to drive home. Prior to discharge you may be required to lay flat for approximately 2-6 hours in the recovery unit to ensure proper clotting of the artery. Please note: You cannot take an Uber/Taxi/etc unless you are accompanied by someone.You will need a responsible adult to stay with you for 24 hours post-procedure.              Diabetic Medication Instructions  Hold oral diabetic medication in morning of your procedure and for 48 hours after IV contrast is given  Typical instructions for insulin diabetic medication holding are below. However, please reach out to your Primary Care Provider or Endocrinologist for specific instructions  DO NOT take any oral diabetic medication, short-acting diabetes medications/insulin, humalog or regular insulin the morning of your test  Take   dose of long-acting insulin (Lantus, Levemir) the day of your test  Remember to  bring your glucometer and insulin with you to take after your test if needed  GLP-1 Agonists Instructions  DO NOT take injectable GLP-1 agonists semaglutide (Ozempic, Wegovy), dulaglutide (Trulicity), exenatide ER (Bydureon), tirzepatide (Mounjaro), or oral semaglutide (Rybelsus) for 7 days prior your procedure  Hold once daily injectable GLP-1 agonists exenatide (Byetta), liraglutide (Saxenda, Victoza), lixisenatide (Soligua) the day before and day of your procedure                  Anticoagulation Medication Instructions   NA  Write N/A if not currently taking    You will need to follow up with one of our cardiology APPs 1-2 weeks after your procedure. If you need help scheduling or rescheduling your appointment, please call [phone]

## 2025-05-05 ENCOUNTER — HOSPITAL ENCOUNTER (OUTPATIENT)
Facility: CLINIC | Age: 75
Discharge: HOME | End: 2025-05-05
Attending: INTERNAL MEDICINE | Admitting: INTERNAL MEDICINE
Payer: COMMERCIAL

## 2025-05-05 VITALS
DIASTOLIC BLOOD PRESSURE: 83 MMHG | HEART RATE: 80 BPM | WEIGHT: 224.1 LBS | BODY MASS INDEX: 32.16 KG/M2 | OXYGEN SATURATION: 99 % | SYSTOLIC BLOOD PRESSURE: 145 MMHG | TEMPERATURE: 97.8 F | RESPIRATION RATE: 17 BRPM

## 2025-05-05 DIAGNOSIS — I25.118 CORONARY ARTERY DISEASE OF NATIVE ARTERY OF NATIVE HEART WITH STABLE ANGINA PECTORIS: Primary | ICD-10-CM

## 2025-05-05 DIAGNOSIS — I25.118 CORONARY ARTERY DISEASE INVOLVING NATIVE CORONARY ARTERY OF NATIVE HEART WITH OTHER FORM OF ANGINA PECTORIS: ICD-10-CM

## 2025-05-05 DIAGNOSIS — R91.8 PULMONARY NODULES: Primary | ICD-10-CM

## 2025-05-05 DIAGNOSIS — R06.02 SOB (SHORTNESS OF BREATH): ICD-10-CM

## 2025-05-05 DIAGNOSIS — I25.10 CAD (CORONARY ARTERY DISEASE): ICD-10-CM

## 2025-05-05 PROBLEM — Z98.890 STATUS POST CORONARY ANGIOGRAM: Status: ACTIVE | Noted: 2025-05-05

## 2025-05-05 LAB
ACT BLD: 251 SECONDS (ref 74–150)
ACT BLD: 259 SECONDS (ref 74–150)
ACT BLD: 272 SECONDS (ref 74–150)
ACT BLD: 292 SECONDS (ref 74–150)
ACT BLD: 324 SECONDS (ref 74–150)
ANION GAP SERPL CALCULATED.3IONS-SCNC: 11 MMOL/L (ref 7–15)
APTT PPP: 26 SECONDS (ref 22–38)
BUN SERPL-MCNC: 21.3 MG/DL (ref 8–23)
CALCIUM SERPL-MCNC: 9.2 MG/DL (ref 8.8–10.4)
CHLORIDE SERPL-SCNC: 99 MMOL/L (ref 98–107)
CHOLEST SERPL-MCNC: 121 MG/DL
CREAT SERPL-MCNC: 0.71 MG/DL (ref 0.67–1.17)
EGFRCR SERPLBLD CKD-EPI 2021: >90 ML/MIN/1.73M2
ERYTHROCYTE [DISTWIDTH] IN BLOOD BY AUTOMATED COUNT: 12.3 % (ref 10–15)
GLUCOSE SERPL-MCNC: 90 MG/DL (ref 70–99)
HCO3 SERPL-SCNC: 23 MMOL/L (ref 22–29)
HCT VFR BLD AUTO: 39.2 % (ref 40–53)
HDLC SERPL-MCNC: 55 MG/DL
HGB BLD-MCNC: 13.7 G/DL (ref 13.3–17.7)
INR PPP: 1.06 (ref 0.85–1.15)
LDLC SERPL CALC-MCNC: 56 MG/DL
MCH RBC QN AUTO: 32.1 PG (ref 26.5–33)
MCHC RBC AUTO-ENTMCNC: 34.9 G/DL (ref 31.5–36.5)
MCV RBC AUTO: 92 FL (ref 78–100)
NONHDLC SERPL-MCNC: 66 MG/DL
PLATELET # BLD AUTO: 211 10E3/UL (ref 150–450)
POTASSIUM SERPL-SCNC: 4 MMOL/L (ref 3.4–5.3)
PROTHROMBIN TIME: 13.8 SECONDS (ref 11.8–14.8)
RBC # BLD AUTO: 4.27 10E6/UL (ref 4.4–5.9)
SODIUM SERPL-SCNC: 133 MMOL/L (ref 135–145)
TRIGL SERPL-MCNC: 50 MG/DL
WBC # BLD AUTO: 6 10E3/UL (ref 4–11)

## 2025-05-05 PROCEDURE — 93005 ELECTROCARDIOGRAM TRACING: CPT

## 2025-05-05 PROCEDURE — 82465 ASSAY BLD/SERUM CHOLESTEROL: CPT | Performed by: STUDENT IN AN ORGANIZED HEALTH CARE EDUCATION/TRAINING PROGRAM

## 2025-05-05 PROCEDURE — C1874 STENT, COATED/COV W/DEL SYS: HCPCS | Performed by: INTERNAL MEDICINE

## 2025-05-05 PROCEDURE — C1887 CATHETER, GUIDING: HCPCS | Performed by: INTERNAL MEDICINE

## 2025-05-05 PROCEDURE — C9600 PERC DRUG-EL COR STENT SING: HCPCS | Performed by: INTERNAL MEDICINE

## 2025-05-05 PROCEDURE — 93454 CORONARY ARTERY ANGIO S&I: CPT | Mod: 26 | Performed by: INTERNAL MEDICINE

## 2025-05-05 PROCEDURE — 250N000009 HC RX 250: Performed by: INTERNAL MEDICINE

## 2025-05-05 PROCEDURE — 80048 BASIC METABOLIC PNL TOTAL CA: CPT | Performed by: INTERNAL MEDICINE

## 2025-05-05 PROCEDURE — 92928 PRQ TCAT PLMT NTRAC ST 1 LES: CPT | Mod: LD | Performed by: INTERNAL MEDICINE

## 2025-05-05 PROCEDURE — 85347 COAGULATION TIME ACTIVATED: CPT

## 2025-05-05 PROCEDURE — 999N000054 HC STATISTIC EKG NON-CHARGEABLE

## 2025-05-05 PROCEDURE — 250N000011 HC RX IP 250 OP 636: Performed by: INTERNAL MEDICINE

## 2025-05-05 PROCEDURE — 85027 COMPLETE CBC AUTOMATED: CPT | Performed by: INTERNAL MEDICINE

## 2025-05-05 PROCEDURE — 93010 ELECTROCARDIOGRAM REPORT: CPT | Mod: XU | Performed by: INTERNAL MEDICINE

## 2025-05-05 PROCEDURE — 84132 ASSAY OF SERUM POTASSIUM: CPT | Performed by: INTERNAL MEDICINE

## 2025-05-05 PROCEDURE — 85730 THROMBOPLASTIN TIME PARTIAL: CPT | Performed by: INTERNAL MEDICINE

## 2025-05-05 PROCEDURE — 85610 PROTHROMBIN TIME: CPT | Performed by: INTERNAL MEDICINE

## 2025-05-05 PROCEDURE — 99152 MOD SED SAME PHYS/QHP 5/>YRS: CPT | Performed by: INTERNAL MEDICINE

## 2025-05-05 PROCEDURE — 93010 ELECTROCARDIOGRAM REPORT: CPT | Mod: XP | Performed by: INTERNAL MEDICINE

## 2025-05-05 PROCEDURE — 92929 PR PRQ TRLUML CORONARY BM STENT W/ANGIO ADDL ART/BRNCH: CPT | Mod: LD | Performed by: INTERNAL MEDICINE

## 2025-05-05 PROCEDURE — 258N000003 HC RX IP 258 OP 636: Performed by: INTERNAL MEDICINE

## 2025-05-05 PROCEDURE — 36415 COLL VENOUS BLD VENIPUNCTURE: CPT | Performed by: INTERNAL MEDICINE

## 2025-05-05 PROCEDURE — 93454 CORONARY ARTERY ANGIO S&I: CPT | Mod: XU | Performed by: INTERNAL MEDICINE

## 2025-05-05 PROCEDURE — C1894 INTRO/SHEATH, NON-LASER: HCPCS | Performed by: INTERNAL MEDICINE

## 2025-05-05 PROCEDURE — 272N000001 HC OR GENERAL SUPPLY STERILE: Performed by: INTERNAL MEDICINE

## 2025-05-05 PROCEDURE — 250N000013 HC RX MED GY IP 250 OP 250 PS 637: Performed by: INTERNAL MEDICINE

## 2025-05-05 PROCEDURE — 92978 ENDOLUMINL IVUS OCT C 1ST: CPT | Performed by: INTERNAL MEDICINE

## 2025-05-05 PROCEDURE — 92978 ENDOLUMINL IVUS OCT C 1ST: CPT | Mod: 26 | Performed by: INTERNAL MEDICINE

## 2025-05-05 PROCEDURE — C1753 CATH, INTRAVAS ULTRASOUND: HCPCS | Performed by: INTERNAL MEDICINE

## 2025-05-05 PROCEDURE — C9601 PERC DRUG-EL COR STENT BRAN: HCPCS | Performed by: INTERNAL MEDICINE

## 2025-05-05 PROCEDURE — C1725 CATH, TRANSLUMIN NON-LASER: HCPCS | Performed by: INTERNAL MEDICINE

## 2025-05-05 PROCEDURE — 99152 MOD SED SAME PHYS/QHP 5/>YRS: CPT | Mod: GC | Performed by: INTERNAL MEDICINE

## 2025-05-05 PROCEDURE — 99153 MOD SED SAME PHYS/QHP EA: CPT | Performed by: INTERNAL MEDICINE

## 2025-05-05 PROCEDURE — 80061 LIPID PANEL: CPT | Performed by: STUDENT IN AN ORGANIZED HEALTH CARE EDUCATION/TRAINING PROGRAM

## 2025-05-05 PROCEDURE — C1769 GUIDE WIRE: HCPCS | Performed by: INTERNAL MEDICINE

## 2025-05-05 PROCEDURE — 250N000011 HC RX IP 250 OP 636: Mod: JZ | Performed by: STUDENT IN AN ORGANIZED HEALTH CARE EDUCATION/TRAINING PROGRAM

## 2025-05-05 PROCEDURE — 258N000003 HC RX IP 258 OP 636: Performed by: STUDENT IN AN ORGANIZED HEALTH CARE EDUCATION/TRAINING PROGRAM

## 2025-05-05 DEVICE — STENT CORONARY DES SYNERGY XD MR US 2.75X32MM H7493941832270: Type: IMPLANTABLE DEVICE | Status: FUNCTIONAL

## 2025-05-05 DEVICE — STENT CORONARY DES SYNERGY XD MR US 2.75X20MM H7493941820270: Type: IMPLANTABLE DEVICE | Status: FUNCTIONAL

## 2025-05-05 DEVICE — STENT MEGATRON US MR 3.50 X 32MM H7493942832350: Type: IMPLANTABLE DEVICE | Status: FUNCTIONAL

## 2025-05-05 RX ORDER — NALOXONE HYDROCHLORIDE 0.4 MG/ML
0.4 INJECTION, SOLUTION INTRAMUSCULAR; INTRAVENOUS; SUBCUTANEOUS
Status: DISCONTINUED | OUTPATIENT
Start: 2025-05-05 | End: 2025-05-05 | Stop reason: HOSPADM

## 2025-05-05 RX ORDER — SODIUM CHLORIDE 9 MG/ML
INJECTION, SOLUTION INTRAVENOUS CONTINUOUS
Status: DISCONTINUED | OUTPATIENT
Start: 2025-05-05 | End: 2025-05-05 | Stop reason: HOSPADM

## 2025-05-05 RX ORDER — ONDANSETRON 2 MG/ML
4 INJECTION INTRAMUSCULAR; INTRAVENOUS EVERY 6 HOURS PRN
Status: DISCONTINUED | OUTPATIENT
Start: 2025-05-05 | End: 2025-05-05 | Stop reason: HOSPADM

## 2025-05-05 RX ORDER — NICARDIPINE HYDROCHLORIDE 2.5 MG/ML
INJECTION INTRAVENOUS
Status: DISCONTINUED | OUTPATIENT
Start: 2025-05-05 | End: 2025-05-05 | Stop reason: HOSPADM

## 2025-05-05 RX ORDER — METOPROLOL TARTRATE 1 MG/ML
5 INJECTION, SOLUTION INTRAVENOUS
Status: DISCONTINUED | OUTPATIENT
Start: 2025-05-05 | End: 2025-05-05 | Stop reason: HOSPADM

## 2025-05-05 RX ORDER — FENTANYL CITRATE 50 UG/ML
25 INJECTION, SOLUTION INTRAMUSCULAR; INTRAVENOUS
Status: DISCONTINUED | OUTPATIENT
Start: 2025-05-05 | End: 2025-05-05 | Stop reason: HOSPADM

## 2025-05-05 RX ORDER — NALOXONE HYDROCHLORIDE 0.4 MG/ML
0.2 INJECTION, SOLUTION INTRAMUSCULAR; INTRAVENOUS; SUBCUTANEOUS
Status: DISCONTINUED | OUTPATIENT
Start: 2025-05-05 | End: 2025-05-05 | Stop reason: HOSPADM

## 2025-05-05 RX ORDER — NITROGLYCERIN 5 MG/ML
VIAL (ML) INTRAVENOUS
Status: DISCONTINUED | OUTPATIENT
Start: 2025-05-05 | End: 2025-05-05 | Stop reason: HOSPADM

## 2025-05-05 RX ORDER — POTASSIUM CHLORIDE 750 MG/1
20 TABLET, EXTENDED RELEASE ORAL
Status: DISCONTINUED | OUTPATIENT
Start: 2025-05-05 | End: 2025-05-05 | Stop reason: HOSPADM

## 2025-05-05 RX ORDER — POTASSIUM CHLORIDE 750 MG/1
40 TABLET, EXTENDED RELEASE ORAL
Status: DISCONTINUED | OUTPATIENT
Start: 2025-05-05 | End: 2025-05-05 | Stop reason: HOSPADM

## 2025-05-05 RX ORDER — OXYCODONE HYDROCHLORIDE 5 MG/1
5 TABLET ORAL EVERY 4 HOURS PRN
Status: DISCONTINUED | OUTPATIENT
Start: 2025-05-05 | End: 2025-05-05 | Stop reason: HOSPADM

## 2025-05-05 RX ORDER — LIDOCAINE 40 MG/G
CREAM TOPICAL
Status: DISCONTINUED | OUTPATIENT
Start: 2025-05-05 | End: 2025-05-05 | Stop reason: HOSPADM

## 2025-05-05 RX ORDER — IOPAMIDOL 755 MG/ML
INJECTION, SOLUTION INTRAVASCULAR
Status: DISCONTINUED | OUTPATIENT
Start: 2025-05-05 | End: 2025-05-05 | Stop reason: HOSPADM

## 2025-05-05 RX ORDER — ROSUVASTATIN CALCIUM 20 MG/1
20 TABLET, COATED ORAL DAILY
Qty: 90 TABLET | Refills: 3 | Status: SHIPPED | OUTPATIENT
Start: 2025-05-05 | End: 2026-04-30

## 2025-05-05 RX ORDER — FENTANYL CITRATE 50 UG/ML
INJECTION, SOLUTION INTRAMUSCULAR; INTRAVENOUS
Status: DISCONTINUED | OUTPATIENT
Start: 2025-05-05 | End: 2025-05-05 | Stop reason: HOSPADM

## 2025-05-05 RX ORDER — ATROPINE SULFATE 0.1 MG/ML
0.5 INJECTION INTRAVENOUS
Status: DISCONTINUED | OUTPATIENT
Start: 2025-05-05 | End: 2025-05-05 | Stop reason: HOSPADM

## 2025-05-05 RX ORDER — ASPIRIN 81 MG/1
81 TABLET, CHEWABLE ORAL DAILY
Qty: 30 TABLET | Refills: 3 | Status: SHIPPED | OUTPATIENT
Start: 2025-05-05

## 2025-05-05 RX ORDER — ASPIRIN 81 MG/1
81 TABLET ORAL DAILY
Status: DISCONTINUED | OUTPATIENT
Start: 2025-05-06 | End: 2025-05-05 | Stop reason: HOSPADM

## 2025-05-05 RX ORDER — ASPIRIN 81 MG/1
243 TABLET, CHEWABLE ORAL ONCE
Status: COMPLETED | OUTPATIENT
Start: 2025-05-05 | End: 2025-05-05

## 2025-05-05 RX ORDER — ROSUVASTATIN CALCIUM 40 MG/1
40 TABLET, COATED ORAL DAILY
Status: DISCONTINUED | OUTPATIENT
Start: 2025-05-05 | End: 2025-05-05 | Stop reason: HOSPADM

## 2025-05-05 RX ORDER — ROSUVASTATIN CALCIUM 40 MG/1
40 TABLET, COATED ORAL DAILY
Qty: 90 TABLET | Refills: 3 | Status: SHIPPED | OUTPATIENT
Start: 2025-05-05 | End: 2025-05-06

## 2025-05-05 RX ORDER — HEPARIN SODIUM 1000 [USP'U]/ML
INJECTION, SOLUTION INTRAVENOUS; SUBCUTANEOUS
Status: DISCONTINUED | OUTPATIENT
Start: 2025-05-05 | End: 2025-05-05 | Stop reason: HOSPADM

## 2025-05-05 RX ORDER — ONDANSETRON 4 MG/1
4 TABLET, ORALLY DISINTEGRATING ORAL EVERY 6 HOURS PRN
Status: DISCONTINUED | OUTPATIENT
Start: 2025-05-05 | End: 2025-05-05 | Stop reason: HOSPADM

## 2025-05-05 RX ORDER — ACETAMINOPHEN 325 MG/1
650 TABLET ORAL EVERY 4 HOURS PRN
Status: DISCONTINUED | OUTPATIENT
Start: 2025-05-05 | End: 2025-05-05 | Stop reason: HOSPADM

## 2025-05-05 RX ORDER — HYDRALAZINE HYDROCHLORIDE 20 MG/ML
10 INJECTION INTRAMUSCULAR; INTRAVENOUS EVERY 4 HOURS PRN
Status: DISCONTINUED | OUTPATIENT
Start: 2025-05-05 | End: 2025-05-05 | Stop reason: HOSPADM

## 2025-05-05 RX ORDER — FLUMAZENIL 0.1 MG/ML
0.2 INJECTION, SOLUTION INTRAVENOUS
Status: DISCONTINUED | OUTPATIENT
Start: 2025-05-05 | End: 2025-05-05 | Stop reason: HOSPADM

## 2025-05-05 RX ORDER — NITROGLYCERIN 0.4 MG/1
0.4 TABLET SUBLINGUAL EVERY 5 MIN PRN
Status: DISCONTINUED | OUTPATIENT
Start: 2025-05-05 | End: 2025-05-05 | Stop reason: HOSPADM

## 2025-05-05 RX ORDER — ASPIRIN 81 MG/1
81 TABLET, CHEWABLE ORAL ONCE
Status: DISCONTINUED | OUTPATIENT
Start: 2025-05-05 | End: 2025-05-05 | Stop reason: HOSPADM

## 2025-05-05 RX ORDER — OXYCODONE HYDROCHLORIDE 10 MG/1
10 TABLET ORAL EVERY 4 HOURS PRN
Status: DISCONTINUED | OUTPATIENT
Start: 2025-05-05 | End: 2025-05-05 | Stop reason: HOSPADM

## 2025-05-05 RX ORDER — ASPIRIN 325 MG
325 TABLET ORAL ONCE
Status: COMPLETED | OUTPATIENT
Start: 2025-05-05 | End: 2025-05-05

## 2025-05-05 RX ADMIN — SODIUM CHLORIDE: 9 INJECTION, SOLUTION INTRAVENOUS at 12:55

## 2025-05-05 RX ADMIN — HYDRALAZINE HYDROCHLORIDE 10 MG: 20 INJECTION INTRAMUSCULAR; INTRAVENOUS at 17:24

## 2025-05-05 RX ADMIN — SODIUM CHLORIDE: 0.9 INJECTION, SOLUTION INTRAVENOUS at 17:24

## 2025-05-05 ASSESSMENT — ACTIVITIES OF DAILY LIVING (ADL)
ADLS_ACUITY_SCORE: 41

## 2025-05-05 NOTE — Clinical Note
The first balloon was inserted into the left anterior descending and middle left anterior descending.Max pressure = 12 kelly. Total duration = 10 seconds.

## 2025-05-05 NOTE — Clinical Note
The first balloon was inserted into the left anterior descending and proximal left anterior descending.Max pressure = 17 kelly. Total duration = 6 seconds.     Max pressure = 17 kelly. Total duration = 6 seconds.    Balloon reinflated a second time: Max pressure = 17 kelly. Total duration = 6 seconds.

## 2025-05-05 NOTE — Clinical Note
Stent deployed in the proximal left anterior descending. Max pressure = 12 kelly. Total duration = 6 seconds.

## 2025-05-05 NOTE — PRE-PROCEDURE
GENERAL PRE-PROCEDURE:   Procedure:  Coronary angiogram with possible percutaneous coronary intervention  Date/Time:  5/5/2025 1:32 PM    Verbal consent obtained?: Yes    Written consent obtained?: Yes    Risks and benefits: Risks, benefits and alternatives were discussed    DC Plan: Appropriate discharge home plan in place for patients who are going home after procedure   Consent given by:  Patient  Patient states understanding of procedure being performed: Yes    Patient's understanding of procedure matches consent: Yes    Procedure consent matches procedure scheduled: Yes    Expected level of sedation:  Moderate  Appropriately NPO:  Yes  ASA Class:  2  Mallampati  :  Grade 2- soft palate, base of uvula, tonsillar pillars, and portion of posterior pharyngeal wall visible  Lungs:  Lungs clear with good breath sounds bilaterally  Heart:  Normal heart sounds and rate  History & Physical reviewed:  History and physical reviewed and updates made (see comment)  Statement of review:  I have reviewed the lab findings, diagnostic data, medications, and the plan for sedation

## 2025-05-05 NOTE — Clinical Note
The first balloon was inserted into the left anterior descending and middle left anterior descending.Max pressure = 6 kelly. Total duration = 10 seconds.     Max pressure = 6 kelly. Total duration = 10 seconds.

## 2025-05-05 NOTE — Clinical Note
Stent deployed in the left anterior descending diagonal. Max pressure = 12 kelly. Total duration = 10 seconds.

## 2025-05-05 NOTE — Clinical Note
The first balloon was inserted into the left anterior descending and proximal left anterior descending.Max pressure = 12 kelly. Total duration = 6 seconds.     Max pressure = 12 kelly. Total duration = 6 seconds.    Balloon reinflated a second time: Max pressure = 12 kelly. Total duration = 6 seconds.

## 2025-05-05 NOTE — PROGRESS NOTES
D/I/A: Pt roomed on 2A, room 13.  Arrived via litter and accompanied by RN off monitor.  VSSA.  Rhythm upon arrival: SR with BBB on monitor.  Denies pain or sob.  Reviewed activity restrictions and when to notify RN, ie-changes to breathing or increased chest pressure or chest pain.  CCL access: Right radial with TR band. Site soft, dry and intact. CMS intact. Pt eating/drinking. Wife updated by MD at bedside.   P: Continue to monitor status.  Discharge to home once meeting criteria.

## 2025-05-05 NOTE — PROGRESS NOTES
Pt arrived to Unit 2a for CORS procedure in CCL. AFVSS. Denies pain. ECG done. Labs resulted. Contrast reviewed. Bilat pp & pt pulses palpable/marked. Pt's wife, Korin, at bedside. Pt stable, ready for consent.

## 2025-05-05 NOTE — Clinical Note
The first balloon was inserted into the left anterior descending and middle left anterior descending.Max pressure = 6 kelly. Total duration = 5 seconds.     Max pressure = 6 kelly. Total duration = 5 seconds.  Balloon reinflated a fourth time: Max pressure = 6 kelly. Total duration = 5 seconds.

## 2025-05-05 NOTE — DISCHARGE INSTRUCTIONS
Going Home after an Angioplasty or Stent Placement (Cardiac)        After you go home:  Have an adult stay with you for 24 hours.  Drink plenty of fluids.  You may eat your normal diet, unless your doctor tells you otherwise.  For 24 hours:  - Relax and take it easy.  - Do NOT smoke.  - Do NOT make any important or legal decisions.  - Do NOT drive or operate machines at home or at work.  - Do NOT drink alcohol.    Remove the Dressing after 24 hours. If there is minor oozing, apply another Band-aid and remove it after 12 hours.  For 2 days, do NOT have sex or do any heavy exercise.  Do NOT take a bath, or use a hot tub or pool for at least 3 days. You may shower.    Care of wrist or arm site  It is normal to have soreness at the puncture site and mild tingling in your hand for up to 3 days.  For 2 days, do not use your hand or arm to support your weight (such as rising from a chair) or bend your wrist (such as lifting a garage door).  For 2 days, do not lift more than 5 pounds or exercise your arm (tennis, golf or bowling).      If you start bleeding from the site in your arm:  Sit down and press firmly on the site with your fingers for 10 minutes. Call your doctor as soon as you can.  If the bleeding stops, sit still and keep your wrist straight for 2 hours.  Medicines  If you have begun Brilinta, Plavix or Effient (with a stent), do not stop taking it until you talk to your heart doctor (cardiologist).  If you are on metformin (Glucophage), do not restart it until you have blood tests (within 2 to 3 days after discharge). When your doctor tells you it is safe, you may restart the metformin.    Call your doctor if:  You have a large or growing hard lump around the site.    The site is red, swollen, hot or tender.  Blood or fluid is draining from the site.  You have chills or a fever greater than 101 F (38 C).  Your leg or arm turns bluish, feels numb or cool.  You have hives, a rash or unusual itching.    Call [phone]  right away if you have:   Bleeding that does not stop.   Heavy bleeding.    If you have any questions or concerns regarding your procedure site please call [phone] at anytime, and hit option 4 to get the . Ask for the Cardiology Fellow on call.  They are available 24 hours a day.  You may also contact the Cardiology Clinic after hours number at [phone].                                                       Telephone Numbers [phone] Monday-Friday 8:00 am to 4:30 pm    [phone] After 4:30 pm Monday-Friday, Weekends & Holidays  Ask for Interventional Cardiologist on call. Someone is on call 24 hours/day   [facility] toll free number [phone] Monday-Friday 8:00 am to 4:30 pm   [facility] Emergency Dept [phone]

## 2025-05-05 NOTE — PRE-PROCEDURE
Consenting/Education for Cardiology Procedure: Possible percutaneous intervention and Coronary angiogram    Patient understands we would like to perform the listed procedure(s) due to elevated CAC score, GARIBAY, pre-surgical cardiac clearance.    The patient understands the following:     The procedure was described to the patient in detail.    Moderate sedation is required for this procedure and the risks, benefits and alternatives to moderate sedation were discussed. Patient also understands risks and complications of the procedure which include but are not limited to bruising/swelling around the incision site, infection, bleeding, allergic reaction to local anesthetic, air embolism, arterial puncture, stroke, heart attack, need for emergency surgery, death.    Patient verbalized understanding of risks and benefits and has elected to proceed with the procedure or procedures listed above.    ELLIOTT Sutherland CNP  Cardiology

## 2025-05-05 NOTE — Clinical Note
Stent deployed in the distal left anterior descending. Max pressure = 12 kelly. Total duration = 10 seconds.

## 2025-05-05 NOTE — PROVIDER NOTIFICATION
Bp 150s-160s systolic. Pt given 10mg IV hydralazine with minimal improvement. Pt did not take any of his bp meds as he usually takes them at night. Dr Cruz updated and MD will restart his home meds. Right radial TR band site remains soft, dry and intact. Will continue to monitor.

## 2025-05-05 NOTE — Clinical Note
Multiple balloon inflation. The first balloon was inserted into the other vessel and Diagonal Branch.Max pressure = 12 kelly. Total duration = 5 seconds.     The second balloon was inserted into the Left Anterior Descending and proximal left anterior descending. Max pressure = 12 kelly. Total duration = 5 seconds.   Multiple inflations. Max pressure = 12 kelly. Total duration = 5 seconds.

## 2025-05-05 NOTE — Clinical Note
The first balloon was inserted into the left anterior descending and proximal left anterior descending.Max pressure = 12 kelly. Total duration = 4 seconds.     Max pressure = 12 kelly. Total duration = 6 seconds.    Balloon reinflated a second time: Max pressure = 12 kelly. Total duration = 6 seconds.

## 2025-05-06 ENCOUNTER — TELEPHONE (OUTPATIENT)
Dept: CARDIOLOGY | Facility: CLINIC | Age: 75
End: 2025-05-06
Payer: COMMERCIAL

## 2025-05-06 DIAGNOSIS — I25.118 CORONARY ARTERY DISEASE INVOLVING NATIVE CORONARY ARTERY OF NATIVE HEART WITH OTHER FORM OF ANGINA PECTORIS: Primary | ICD-10-CM

## 2025-05-06 LAB
ATRIAL RATE - MUSE: 78 BPM
ATRIAL RATE - MUSE: 87 BPM
DIASTOLIC BLOOD PRESSURE - MUSE: NORMAL MMHG
DIASTOLIC BLOOD PRESSURE - MUSE: NORMAL MMHG
INTERPRETATION ECG - MUSE: NORMAL
INTERPRETATION ECG - MUSE: NORMAL
P AXIS - MUSE: 63 DEGREES
P AXIS - MUSE: 98 DEGREES
PR INTERVAL - MUSE: 178 MS
PR INTERVAL - MUSE: 192 MS
QRS DURATION - MUSE: 172 MS
QRS DURATION - MUSE: 176 MS
QT - MUSE: 430 MS
QT - MUSE: 432 MS
QTC - MUSE: 492 MS
QTC - MUSE: 517 MS
R AXIS - MUSE: -13 DEGREES
R AXIS - MUSE: 28 DEGREES
SYSTOLIC BLOOD PRESSURE - MUSE: NORMAL MMHG
SYSTOLIC BLOOD PRESSURE - MUSE: NORMAL MMHG
T AXIS - MUSE: 117 DEGREES
T AXIS - MUSE: 120 DEGREES
VENTRICULAR RATE- MUSE: 78 BPM
VENTRICULAR RATE- MUSE: 87 BPM

## 2025-05-06 NOTE — PROGRESS NOTES
Pt developed hematoma/bleeding at the radial site after getting up to use BR. Manual pressure held for 10 minutes with hemostasis. Site covered with quick clot and tegaderm. CMS intact. Dr Corrales at bedside, assessed the site and okayed for pt to discharge home. Per MD-pt to start his Brilinta tonight and pt and spouse both verbalized understanding. PIV removed. Pt escorted via wheelchair to the shuttle accompanied by his wife. Pt has all his belongings.

## 2025-05-06 NOTE — TELEPHONE ENCOUNTER
Post-Angiogram Discharge Call    How are you feeling since you got home? Do you understand your results?  WDL.   No - Results discussed and AVS reviewed    How is your groin/wrist/incision site? Can you please describe it?  WDL. Flat and dry.    Do you have any questions regarding your restrictions and when to resume normal activity?  Yes - AVS reviewed    Do you have the anti-platelet medication you were prescribed?  Yes - Medication prescribed and patient is taking it    Any questions about the other medications you were prescribed?  Yes - Brilinta, ASA, Crestor. Each medication change reviewed, including purpose and side effects    Has cardiac rehab reached out to you?  Yes    Do you have any other questions about the discharge instructions?  Yes - AVS reviewed    Has a follow up appointment been made within 1-2 weeks?  Has CORE apt at the end of this month, but not apt with general cardiology. Will have CACs reach out to pt.    Have LDL recheck labs been ordered for post- PCI and CABG patients?  No. Will order and have CACs schedule fasting lab apt.

## 2025-05-06 NOTE — PROGRESS NOTES
Right radial TR band deflated, site covered with primapore remained soft, dry and intact. CMS intact. Pt ate/drank, voided and ambulated in mckeon with steady gait. Vitals stable: Bp 140s systolic. Rhythm: SR with BBB. Denies any pain. Discharge instructions reviewed with pt and spouse Korin and they verbalizes understanding. Copy of AVS along with stent card given to pt. Prescriptions x 3 filled and given to pt.

## 2025-05-08 ENCOUNTER — OFFICE VISIT (OUTPATIENT)
Dept: CARDIOLOGY | Facility: CLINIC | Age: 75
End: 2025-05-08
Attending: INTERNAL MEDICINE
Payer: COMMERCIAL

## 2025-05-08 VITALS
DIASTOLIC BLOOD PRESSURE: 76 MMHG | WEIGHT: 225 LBS | SYSTOLIC BLOOD PRESSURE: 115 MMHG | OXYGEN SATURATION: 97 % | HEART RATE: 88 BPM | BODY MASS INDEX: 32.28 KG/M2

## 2025-05-08 DIAGNOSIS — I25.118 CORONARY ARTERY DISEASE OF NATIVE ARTERY OF NATIVE HEART WITH STABLE ANGINA PECTORIS: ICD-10-CM

## 2025-05-08 PROCEDURE — G0463 HOSPITAL OUTPT CLINIC VISIT: HCPCS | Performed by: INTERNAL MEDICINE

## 2025-05-08 ASSESSMENT — PAIN SCALES - GENERAL: PAINLEVEL_OUTOF10: NO PAIN (0)

## 2025-05-08 NOTE — NURSING NOTE
Plan:   Continue taking your medications as prescribed  Follow up in one year      KAREN AyoubN, RN  RN Care Coordinator - General Cardiology   [facility]

## 2025-05-08 NOTE — LETTER
5/8/2025      RE: David Sierra  [address]  [place] [address]       Dear Colleague,    Thank you for the opportunity to participate in the care of your patient, David Sierra, at the [facility]. Please see a copy of my visit note below.    No notes on file    Please do not hesitate to contact me if you have any questions/concerns.     Sincerely,     Isaak Hernandez MD

## 2025-05-08 NOTE — NURSING NOTE
Chief Complaint   Patient presents with    Follow Up     RETURN CARDIOLOGY     Vitals were taken and medications reconciled.    Erich Lr, KALLI  2:33 PM

## 2025-05-08 NOTE — CONFIDENTIAL NOTE
HPI:     I have the privilege to evaluate and examine Mr David Sierra, who is a 75 yr patient, who underwent on 5/5/24 a coronary angiogram because of angina with following results  1.right dominant coronary artery system  2.severe single vessel CAD with  of the mid LAD.  3.bifurcation stenting of proximal to mid LAD and first diagonal as follows :  - PCI of proximal to mid LAD using a 3.5 x 32 mm Megatron drug-eluting stent which was postdilated using a 4.5 mm noncompliant balloon proximally and using a 3.5 mm noncompliant balloon in the mid segment  -PCI of first diagonal vessel using a 2.75 x 32 mm Synergy drug-eluting stent   4.PCI of mid LAD using a 2.75 x 20 mm Synergy drug-eluting stent  5. IVUS of the left main and LAD    Today patient comes for follow-up.  Patient feels very well - denies chest pain, shortness of breath.        PAST MEDICAL HISTORY:  Past Medical History:   Diagnosis Date    Arthritis Not sure    treated with Aspirins    Cancer (H) 2016    Skin Cancers; treated with Surgeries (3); involved back & Scalp    Hypertension 1995    Dr Lilian Gonzales (HP / Retired) diagnosed the High BP       CURRENT MEDICATIONS:  Current Outpatient Medications   Medication Sig Dispense Refill    amLODIPine (NORVASC) 5 MG tablet Take 1 tablet (5 mg) by mouth daily. (Patient taking differently: Take 7.5 mg by mouth daily.) 90 tablet 3    aspirin (ASA) 81 MG chewable tablet Take 1 tablet (81 mg) by mouth daily. Starting tomorrow. 30 tablet 3    fish oil-omega-3 fatty acids 1000 MG capsule Take 2,000 mg by mouth      hydroCHLOROthiazide 12.5 MG tablet Take 1 tablet (12.5 mg) by mouth daily. 90 tablet 3    isosorbide mononitrate (IMDUR) 30 MG 24 hr tablet Take 1 tablet (30 mg) by mouth daily. 90 tablet 3    isosorbide mononitrate (IMDUR) 60 MG 24 hr tablet Take 1 tablet (60 mg) by mouth daily. 90 tablet 3    ketoconazole (NIZORAL) 2 % external cream Apply to face once daily on Monday, Wednesday and Friday. Mix  1:1 with Elidel      ketoconazole (NIZORAL) 2 % external shampoo 2 mLs      lisinopril (ZESTRIL) 40 MG tablet Take 1 tablet (40 mg) by mouth daily. 90 tablet 1    metFORMIN (GLUCOPHAGE XR) 500 MG 24 hr tablet TAKE THREE TABLETS BY MOUTH ONCE DAILY WITH A MEAL 270 tablet 1    pimecrolimus (ELIDEL) 1 % external cream 1 g      rosuvastatin (CRESTOR) 20 MG tablet Take 1 tablet (20 mg) by mouth daily. 90 tablet 3    tamsulosin (FLOMAX) 0.4 MG capsule Take 2 capsules (0.8 mg) by mouth daily. 180 capsule 0    ticagrelor (BRILINTA) 90 MG tablet Take 90 mg by mouth 2 times daily.      ticagrelor (BRILINTA) 90 MG tablet Take 1 tablet (90 mg) by mouth 2 times daily. Start this evening. 180 tablet 3    methocarbamol (ROBAXIN) 500 MG tablet  (Patient not taking: Reported on 2025)         PAST SURGICAL HISTORY:  Past Surgical History:   Procedure Laterality Date    APPENDECTOMY      None    BIOPSY  May 2024    Skin CA - neck area    COLONOSCOPY      None    CV CORONARY ANGIOGRAM N/A 2025    Procedure: Coronary Angiogram;  Surgeon: Franky Marrufo MD;  Location: [facility] CARDIAC CATH LAB    CV INTRAVASULAR ULTRASOUND N/A 2025    Procedure: Intravascular Ultrasound;  Surgeon: Franky Marrufo MD;  Location: [facility] CARDIAC CATH LAB    CV PCI N/A 2025    Procedure: Percutaneous Coronary Intervention;  Surgeon: Franky Marrufo MD;  Location: [facility] CARDIAC CATH LAB    ORTHOPEDIC SURGERY  2016    Modified structure of the ankle; cut and reattached Achilles tendon       ALLERGIES   No Known Allergies    FAMILY HISTORY:  Family History   Problem Relation Age of Onset    Cerebrovascular Disease Mother          from CVA complications ()    Thyroid Disease Mother         Life long    Hypertension Father     Colon Cancer Father         ; treated surgically    Obesity Father         Life long    Diabetes Father         Adult onset       SOCIAL HISTORY:  Social  History     Socioeconomic History    Marital status:      Spouse name: None    Number of children: None    Years of education: None    Highest education level: None   Tobacco Use    Smoking status: Never    Smokeless tobacco: Never    Tobacco comments:     No history with tobacco   Vaping Use    Vaping status: Never Used   Substance and Sexual Activity    Alcohol use: Yes     Comment: Occasional.   Once or twice   a month.    Drug use: Never    Sexual activity: Yes     Partners: Female     Birth control/protection: Post-menopausal, Male Surgical   Other Topics Concern    Parent/sibling w/ CABG, MI or angioplasty before 65F 55M? No     Social Drivers of Health     Financial Resource Strain: Low Risk  (2/10/2025)    Financial Resource Strain     Within the past 12 months, have you or your family members you live with been unable to get utilities (heat, electricity) when it was really needed?: No   Food Insecurity: Low Risk  (2/10/2025)    Food Insecurity     Within the past 12 months, did you worry that your food would run out before you got money to buy more?: No     Within the past 12 months, did the food you bought just not last and you didn t have money to get more?: No   Transportation Needs: Low Risk  (2/10/2025)    Transportation Needs     Within the past 12 months, has lack of transportation kept you from medical appointments, getting your medicines, non-medical meetings or appointments, work, or from getting things that you need?: No   Physical Activity: Unknown (2/10/2025)    Exercise Vital Sign     Days of Exercise per Week: 5 days   Stress: No Stress Concern Present (2/10/2025)    [employer] [place] [employer] - Occupational Stress Questionnaire     Feeling of Stress : Not at all   Social Connections: Unknown (2/10/2025)    Social Connection and Isolation Panel [NHANES]     Frequency of Social Gatherings with Friends and Family: More than three times a week   Interpersonal Safety: Low  Risk  (5/5/2025)    Interpersonal Safety     Do you feel physically and emotionally safe where you currently live?: Yes     Within the past 12 months, have you been hit, slapped, kicked or otherwise physically hurt by someone?: No     Within the past 12 months, have you been humiliated or emotionally abused in other ways by your partner or ex-partner?: No   Housing Stability: Low Risk  (2/10/2025)    Housing Stability     Do you have housing? : Yes     Are you worried about losing your housing?: No       ROS:   Constitutional: No fever, chills, or sweats. No weight gain/loss   ENT: No visual disturbance, ear ache, epistaxis, sore throat  Allergies/Immunologic: Negative.   Respiratory: No cough, hemoptysia  Cardiovascular: As per HPI  GI: No nausea, vomiting, hematemesis, melena, or hematochezia  : No urinary frequency, dysuria, or hematuria  Integument: Negative  Psychiatric: Negative  Neuro: Negative  Endocrinology: Negative   Musculoskeletal: Negative    EXAM:  /76 (BP Location: Right arm, Patient Position: Chair, Cuff Size: Adult Large)   Pulse 88   Wt 102.1 kg (225 lb)   SpO2 97%   BMI 32.28 kg/m    In general, the patient is a pleasant male in no apparent distress.    HEENT: NC/AT.  PERRLA.  EOMI.  Sclerae white, not injected.  Nares clear.  Pharynx without erythema or exudate.  Dentition intact.    Neck: No adenopathy.  No thyromegaly. Carotids +4/4 bilaterally without bruits.  No jugular venous distension.   Heart: RRR. Normal S1, S2 splits physiologically. No murmur, rub, click, or gallop. The PMI is in the 5th ICS in the midclavicular line. There is no heave.    Lungs: CTA.  No ronchi, wheezes, rales.  No dullness to percussion.   Abdomen: Soft, nontender, nondistended. No organomegaly.  No bruits.   Extremities: No clubbing, cyanosis, or edema.  The pulses are +4/4 at the radial, brachial, femoral, popliteal, DP, and PT sites bilaterally.  No bruits are noted.  Neurologic: Alert and oriented to  person/place/time, normal speech, gait and affect  Skin: No petechiae, purpura or rash.    Labs:  LIPID RESULTS:  Lab Results   Component Value Date    CHOL 121 05/05/2025    HDL 55 05/05/2025    LDL 56 05/05/2025    TRIG 50 05/05/2025    NHDL 66 05/05/2025       LIVER ENZYME RESULTS:  Lab Results   Component Value Date    AST 18 02/10/2025    ALT 9 02/10/2025       CBC RESULTS:  Lab Results   Component Value Date    WBC 6.0 05/05/2025    RBC 4.27 (L) 05/05/2025    HGB 13.7 05/05/2025    HCT 39.2 (L) 05/05/2025    MCV 92 05/05/2025    MCH 32.1 05/05/2025    MCHC 34.9 05/05/2025    RDW 12.3 05/05/2025     05/05/2025       BMP RESULTS:  Lab Results   Component Value Date     (L) 05/05/2025    POTASSIUM 4.0 05/05/2025    CHLORIDE 99 05/05/2025    CO2 23 05/05/2025    ANIONGAP 11 05/05/2025    GLC 90 05/05/2025    BUN 21.3 05/05/2025    CR 0.71 05/05/2025    GFRESTIMATED >90 05/05/2025    BRIANNA 9.2 05/05/2025        A1C RESULTS:  Lab Results   Component Value Date    A1C 5.3 02/10/2025       INR RESULTS:  Lab Results   Component Value Date    INR 1.06 05/05/2025           Assessment and Plan:     I disucssed the results with patient.  Lipids are well controlled.  I discussed the importance of a heart healthy diet and lifestyle  We continue with the same medical regimen.    Before his coronary angiogram followed with PCI and stents - he would have within a few weeks an orthopedic itnervention.    I told that he needs to postpone it for 6 months because he needs on aspirin and Brilinta because of PCI and stents and it would be too risk to stop one of  these medications.    Follow-up with 6 months      Isaak Hernandez MD, PhD  Professor of Medicine  Division of Cardiology       CC  Patient Care Team:  Yusra Pascual MD as PCP - General (Internal Medicine)  Yusra Pascual MD as Assigned PCP  Cindy Lynch AuD as Audiologist (Audiology)  Tone Burns MD as Physician (Urology)  Benjamin,  MD Sujey as MD (Pulmonary & Critical Care Medicine)  Isaak Shah MD as MD (Cardiovascular Disease)  Tone Burns MD as Assigned Surgical Provider  Isaak Sahh MD as Assigned Heart and Vascular Provider  Sruthi Brandt RN as Specialty Care Coordinator (Cardiology)  ISAAK SHAH

## 2025-05-08 NOTE — PATIENT INSTRUCTIONS
Plan:   Continue taking your medications as prescribed  Follow up in one year        Your Care Team:         Cardiology   Telephone Number     Bessy Worley RN [phone]    After business hours: [phone], ask for cardiologist on-call           On-call cardiologist for after hours or on weekends:    [phone], option #4, and ask to speak to the on-call cardiologist.    Cardiovascular Clinic:   [address] [place] [address]. [place] [address]      As always, thank you for trusting us with your health care needs!    If you have further questions, please utilize [employer] to contact us.

## 2025-05-20 ENCOUNTER — MYC MEDICAL ADVICE (OUTPATIENT)
Dept: INTERNAL MEDICINE | Facility: CLINIC | Age: 75
End: 2025-05-20
Payer: COMMERCIAL

## 2025-05-20 DIAGNOSIS — M54.16 LUMBAR RADICULOPATHY: ICD-10-CM

## 2025-05-21 DIAGNOSIS — R39.9 LOWER URINARY TRACT SYMPTOMS (LUTS): ICD-10-CM

## 2025-05-21 RX ORDER — TAMSULOSIN HYDROCHLORIDE 0.4 MG/1
0.8 CAPSULE ORAL DAILY
Qty: 180 CAPSULE | Refills: 1 | Status: SHIPPED | OUTPATIENT
Start: 2025-05-21

## 2025-05-21 RX ORDER — GABAPENTIN 300 MG/1
300 CAPSULE ORAL 2 TIMES DAILY
Qty: 60 CAPSULE | Refills: 3 | Status: SHIPPED | OUTPATIENT
Start: 2025-05-21

## 2025-05-30 ENCOUNTER — HOSPITAL ENCOUNTER (OUTPATIENT)
Dept: CARDIAC REHAB | Facility: CLINIC | Age: 75
Discharge: HOME | End: 2025-05-30
Attending: STUDENT IN AN ORGANIZED HEALTH CARE EDUCATION/TRAINING PROGRAM
Payer: COMMERCIAL

## 2025-05-30 PROCEDURE — 93798 PHYS/QHP OP CAR RHAB W/ECG: CPT

## 2025-06-02 ENCOUNTER — HOSPITAL ENCOUNTER (OUTPATIENT)
Dept: CARDIAC REHAB | Facility: CLINIC | Age: 75
Discharge: HOME | End: 2025-06-02
Attending: STUDENT IN AN ORGANIZED HEALTH CARE EDUCATION/TRAINING PROGRAM
Payer: COMMERCIAL

## 2025-06-02 PROCEDURE — 93798 PHYS/QHP OP CAR RHAB W/ECG: CPT

## 2025-06-04 ENCOUNTER — HOSPITAL ENCOUNTER (OUTPATIENT)
Dept: CARDIAC REHAB | Facility: CLINIC | Age: 75
Discharge: HOME | End: 2025-06-04
Attending: STUDENT IN AN ORGANIZED HEALTH CARE EDUCATION/TRAINING PROGRAM
Payer: COMMERCIAL

## 2025-06-04 PROCEDURE — 93798 PHYS/QHP OP CAR RHAB W/ECG: CPT

## 2025-06-09 ENCOUNTER — HOSPITAL ENCOUNTER (OUTPATIENT)
Dept: CARDIAC REHAB | Facility: CLINIC | Age: 75
Discharge: HOME | End: 2025-06-09
Attending: STUDENT IN AN ORGANIZED HEALTH CARE EDUCATION/TRAINING PROGRAM
Payer: COMMERCIAL

## 2025-06-09 PROCEDURE — 93798 PHYS/QHP OP CAR RHAB W/ECG: CPT

## 2025-06-11 ENCOUNTER — HOSPITAL ENCOUNTER (OUTPATIENT)
Dept: CARDIAC REHAB | Facility: CLINIC | Age: 75
Discharge: HOME | End: 2025-06-11
Attending: STUDENT IN AN ORGANIZED HEALTH CARE EDUCATION/TRAINING PROGRAM
Payer: COMMERCIAL

## 2025-06-11 PROCEDURE — 93798 PHYS/QHP OP CAR RHAB W/ECG: CPT

## 2025-06-13 ENCOUNTER — HOSPITAL ENCOUNTER (OUTPATIENT)
Dept: CARDIAC REHAB | Facility: CLINIC | Age: 75
Discharge: HOME | End: 2025-06-13
Attending: STUDENT IN AN ORGANIZED HEALTH CARE EDUCATION/TRAINING PROGRAM
Payer: COMMERCIAL

## 2025-06-13 PROBLEM — R06.02 SOB (SHORTNESS OF BREATH): Status: RESOLVED | Noted: 2025-05-05 | Resolved: 2025-06-13

## 2025-06-13 PROCEDURE — 93798 PHYS/QHP OP CAR RHAB W/ECG: CPT

## 2025-06-16 ENCOUNTER — HOSPITAL ENCOUNTER (OUTPATIENT)
Dept: CARDIAC REHAB | Facility: CLINIC | Age: 75
Discharge: HOME | End: 2025-06-16
Attending: STUDENT IN AN ORGANIZED HEALTH CARE EDUCATION/TRAINING PROGRAM
Payer: COMMERCIAL

## 2025-06-16 PROCEDURE — 93798 PHYS/QHP OP CAR RHAB W/ECG: CPT

## 2025-06-18 ENCOUNTER — HOSPITAL ENCOUNTER (OUTPATIENT)
Dept: CARDIAC REHAB | Facility: CLINIC | Age: 75
Discharge: HOME | End: 2025-06-18
Attending: STUDENT IN AN ORGANIZED HEALTH CARE EDUCATION/TRAINING PROGRAM
Payer: COMMERCIAL

## 2025-06-18 PROCEDURE — 93798 PHYS/QHP OP CAR RHAB W/ECG: CPT

## 2025-06-20 ENCOUNTER — HOSPITAL ENCOUNTER (OUTPATIENT)
Dept: CARDIAC REHAB | Facility: CLINIC | Age: 75
Discharge: HOME | End: 2025-06-20
Attending: STUDENT IN AN ORGANIZED HEALTH CARE EDUCATION/TRAINING PROGRAM
Payer: COMMERCIAL

## 2025-06-20 PROCEDURE — 93798 PHYS/QHP OP CAR RHAB W/ECG: CPT

## 2025-06-23 ENCOUNTER — HOSPITAL ENCOUNTER (OUTPATIENT)
Dept: CARDIAC REHAB | Facility: CLINIC | Age: 75
Discharge: HOME | End: 2025-06-23
Attending: STUDENT IN AN ORGANIZED HEALTH CARE EDUCATION/TRAINING PROGRAM
Payer: COMMERCIAL

## 2025-06-23 PROCEDURE — 93798 PHYS/QHP OP CAR RHAB W/ECG: CPT

## 2025-06-25 ENCOUNTER — HOSPITAL ENCOUNTER (OUTPATIENT)
Dept: CARDIAC REHAB | Facility: CLINIC | Age: 75
Discharge: HOME | End: 2025-06-25
Attending: STUDENT IN AN ORGANIZED HEALTH CARE EDUCATION/TRAINING PROGRAM
Payer: COMMERCIAL

## 2025-06-25 PROCEDURE — 93798 PHYS/QHP OP CAR RHAB W/ECG: CPT

## 2025-07-09 ENCOUNTER — HOSPITAL ENCOUNTER (OUTPATIENT)
Dept: CARDIAC REHAB | Facility: CLINIC | Age: 75
Discharge: HOME | End: 2025-07-09
Attending: STUDENT IN AN ORGANIZED HEALTH CARE EDUCATION/TRAINING PROGRAM
Payer: COMMERCIAL

## 2025-07-09 PROCEDURE — 93798 PHYS/QHP OP CAR RHAB W/ECG: CPT

## 2025-07-11 ENCOUNTER — HOSPITAL ENCOUNTER (OUTPATIENT)
Dept: CARDIAC REHAB | Facility: CLINIC | Age: 75
Discharge: HOME | End: 2025-07-11
Attending: STUDENT IN AN ORGANIZED HEALTH CARE EDUCATION/TRAINING PROGRAM
Payer: COMMERCIAL

## 2025-07-11 PROCEDURE — 93798 PHYS/QHP OP CAR RHAB W/ECG: CPT

## 2025-07-14 ENCOUNTER — HOSPITAL ENCOUNTER (OUTPATIENT)
Dept: CARDIAC REHAB | Facility: CLINIC | Age: 75
Discharge: HOME | End: 2025-07-14
Attending: STUDENT IN AN ORGANIZED HEALTH CARE EDUCATION/TRAINING PROGRAM
Payer: COMMERCIAL

## 2025-07-14 PROCEDURE — 93798 PHYS/QHP OP CAR RHAB W/ECG: CPT

## 2025-07-14 PROCEDURE — 93797 PHYS/QHP OP CAR RHAB WO ECG: CPT

## 2025-07-16 ENCOUNTER — HOSPITAL ENCOUNTER (OUTPATIENT)
Dept: CARDIAC REHAB | Facility: CLINIC | Age: 75
Discharge: HOME | End: 2025-07-16
Attending: STUDENT IN AN ORGANIZED HEALTH CARE EDUCATION/TRAINING PROGRAM
Payer: COMMERCIAL

## 2025-07-16 PROCEDURE — 93798 PHYS/QHP OP CAR RHAB W/ECG: CPT

## 2025-07-18 ENCOUNTER — HOSPITAL ENCOUNTER (OUTPATIENT)
Dept: CARDIAC REHAB | Facility: CLINIC | Age: 75
Discharge: HOME | End: 2025-07-18
Attending: STUDENT IN AN ORGANIZED HEALTH CARE EDUCATION/TRAINING PROGRAM
Payer: COMMERCIAL

## 2025-07-18 PROCEDURE — 93798 PHYS/QHP OP CAR RHAB W/ECG: CPT

## 2025-07-21 ENCOUNTER — HOSPITAL ENCOUNTER (OUTPATIENT)
Dept: CARDIAC REHAB | Facility: CLINIC | Age: 75
Discharge: HOME | End: 2025-07-21
Attending: STUDENT IN AN ORGANIZED HEALTH CARE EDUCATION/TRAINING PROGRAM
Payer: COMMERCIAL

## 2025-07-21 PROCEDURE — 93797 PHYS/QHP OP CAR RHAB WO ECG: CPT

## 2025-07-21 PROCEDURE — 93798 PHYS/QHP OP CAR RHAB W/ECG: CPT

## 2025-07-22 ENCOUNTER — TELEPHONE (OUTPATIENT)
Dept: PHARMACY | Facility: OTHER | Age: 75
End: 2025-07-22
Payer: COMMERCIAL

## 2025-07-22 NOTE — TELEPHONE ENCOUNTER
Pt lvm on 7/21 @4:13 pm, return call on 7/22.  I asked patient if he wanted in person, virtual or telephone. He said he rec'd letter from insurance and wanted to know what this entails. Explained the purpose. He asked me what would I recommend and I said that ever patient is different on what they prefer and said that I wasn't helpful on deciding. I offered him a virtual on 8/11 then he said that won't work and wants in person so offered him 8/19 in person. He was not happy with me during our phone call with scheduling.    Ida Roach [facility]   [phone]

## 2025-07-23 ENCOUNTER — HOSPITAL ENCOUNTER (OUTPATIENT)
Dept: CARDIAC REHAB | Facility: CLINIC | Age: 75
Discharge: HOME | End: 2025-07-23
Attending: STUDENT IN AN ORGANIZED HEALTH CARE EDUCATION/TRAINING PROGRAM
Payer: COMMERCIAL

## 2025-07-23 PROCEDURE — 93798 PHYS/QHP OP CAR RHAB W/ECG: CPT

## 2025-08-06 ENCOUNTER — HOSPITAL ENCOUNTER (OUTPATIENT)
Dept: CARDIAC REHAB | Facility: CLINIC | Age: 75
Discharge: HOME | End: 2025-08-06
Attending: STUDENT IN AN ORGANIZED HEALTH CARE EDUCATION/TRAINING PROGRAM
Payer: COMMERCIAL

## 2025-08-06 PROCEDURE — 93798 PHYS/QHP OP CAR RHAB W/ECG: CPT

## 2025-08-08 ENCOUNTER — HOSPITAL ENCOUNTER (OUTPATIENT)
Dept: CARDIAC REHAB | Facility: CLINIC | Age: 75
Discharge: HOME | End: 2025-08-08
Attending: STUDENT IN AN ORGANIZED HEALTH CARE EDUCATION/TRAINING PROGRAM
Payer: COMMERCIAL

## 2025-08-08 PROCEDURE — 93798 PHYS/QHP OP CAR RHAB W/ECG: CPT

## 2025-08-13 ENCOUNTER — HOSPITAL ENCOUNTER (OUTPATIENT)
Dept: CARDIAC REHAB | Facility: CLINIC | Age: 75
Discharge: HOME | End: 2025-08-13
Attending: STUDENT IN AN ORGANIZED HEALTH CARE EDUCATION/TRAINING PROGRAM
Payer: COMMERCIAL

## 2025-08-13 PROCEDURE — 93798 PHYS/QHP OP CAR RHAB W/ECG: CPT

## 2025-08-15 ENCOUNTER — HOSPITAL ENCOUNTER (OUTPATIENT)
Dept: CARDIAC REHAB | Facility: CLINIC | Age: 75
Discharge: HOME | End: 2025-08-15
Attending: STUDENT IN AN ORGANIZED HEALTH CARE EDUCATION/TRAINING PROGRAM
Payer: COMMERCIAL

## 2025-08-15 PROCEDURE — 93798 PHYS/QHP OP CAR RHAB W/ECG: CPT

## 2025-08-18 ENCOUNTER — HOSPITAL ENCOUNTER (OUTPATIENT)
Dept: CARDIAC REHAB | Facility: CLINIC | Age: 75
Discharge: HOME | End: 2025-08-18
Attending: STUDENT IN AN ORGANIZED HEALTH CARE EDUCATION/TRAINING PROGRAM
Payer: COMMERCIAL

## 2025-08-18 PROCEDURE — 93798 PHYS/QHP OP CAR RHAB W/ECG: CPT

## 2025-08-18 PROCEDURE — 93797 PHYS/QHP OP CAR RHAB WO ECG: CPT

## 2025-08-19 ENCOUNTER — OFFICE VISIT (OUTPATIENT)
Dept: PHARMACY | Facility: CLINIC | Age: 75
End: 2025-08-19
Payer: COMMERCIAL

## 2025-08-19 ENCOUNTER — OFFICE VISIT (OUTPATIENT)
Dept: SURGERY | Facility: CLINIC | Age: 75
End: 2025-08-19
Payer: COMMERCIAL

## 2025-08-19 VITALS
SYSTOLIC BLOOD PRESSURE: 108 MMHG | BODY MASS INDEX: 31.38 KG/M2 | WEIGHT: 219.2 LBS | DIASTOLIC BLOOD PRESSURE: 64 MMHG | HEIGHT: 70 IN

## 2025-08-19 DIAGNOSIS — E66.811 CLASS 1 OBESITY DUE TO EXCESS CALORIES WITH SERIOUS COMORBIDITY AND BODY MASS INDEX (BMI) OF 33.0 TO 33.9 IN ADULT: ICD-10-CM

## 2025-08-19 DIAGNOSIS — E66.09 CLASS 1 OBESITY DUE TO EXCESS CALORIES WITH SERIOUS COMORBIDITY AND BODY MASS INDEX (BMI) OF 33.0 TO 33.9 IN ADULT: ICD-10-CM

## 2025-08-19 DIAGNOSIS — E66.9 OBESITY (BMI 30-39.9): Primary | ICD-10-CM

## 2025-08-19 DIAGNOSIS — I25.83 CORONARY ARTERY DISEASE DUE TO LIPID RICH PLAQUE: ICD-10-CM

## 2025-08-19 DIAGNOSIS — I10 ESSENTIAL HYPERTENSION: Primary | ICD-10-CM

## 2025-08-19 DIAGNOSIS — I10 ESSENTIAL HYPERTENSION: ICD-10-CM

## 2025-08-19 DIAGNOSIS — M47.817 LUMBOSACRAL SPONDYLOSIS WITHOUT MYELOPATHY: ICD-10-CM

## 2025-08-19 DIAGNOSIS — E78.2 MIXED HYPERLIPIDEMIA: ICD-10-CM

## 2025-08-19 DIAGNOSIS — I25.10 CORONARY ARTERY DISEASE DUE TO LIPID RICH PLAQUE: ICD-10-CM

## 2025-08-19 PROCEDURE — 99605 MTMS BY PHARM NP 15 MIN: CPT | Performed by: PHARMACIST

## 2025-08-19 PROCEDURE — 99607 MTMS BY PHARM ADDL 15 MIN: CPT | Performed by: PHARMACIST

## 2025-08-19 PROCEDURE — 3074F SYST BP LT 130 MM HG: CPT | Performed by: FAMILY MEDICINE

## 2025-08-19 PROCEDURE — 3078F DIAST BP <80 MM HG: CPT | Performed by: FAMILY MEDICINE

## 2025-08-19 PROCEDURE — 99214 OFFICE O/P EST MOD 30 MIN: CPT | Performed by: FAMILY MEDICINE

## 2025-08-19 RX ORDER — MV-MIN/FOLIC/K1/LYCOPEN/LUTEIN 300-60 MCG
1 TABLET ORAL DAILY
COMMUNITY

## 2025-08-19 RX ORDER — ACETAMINOPHEN 500 MG
500 TABLET ORAL PRN
COMMUNITY

## 2025-08-20 ENCOUNTER — HOSPITAL ENCOUNTER (OUTPATIENT)
Dept: CARDIAC REHAB | Facility: CLINIC | Age: 75
Discharge: HOME | End: 2025-08-20
Attending: STUDENT IN AN ORGANIZED HEALTH CARE EDUCATION/TRAINING PROGRAM
Payer: COMMERCIAL

## 2025-08-20 PROCEDURE — 93798 PHYS/QHP OP CAR RHAB W/ECG: CPT

## 2025-08-22 ENCOUNTER — HOSPITAL ENCOUNTER (OUTPATIENT)
Dept: CARDIAC REHAB | Facility: CLINIC | Age: 75
Discharge: HOME | End: 2025-08-22
Attending: STUDENT IN AN ORGANIZED HEALTH CARE EDUCATION/TRAINING PROGRAM
Payer: COMMERCIAL

## 2025-08-22 PROCEDURE — 93797 PHYS/QHP OP CAR RHAB WO ECG: CPT

## 2025-08-22 PROCEDURE — 93798 PHYS/QHP OP CAR RHAB W/ECG: CPT

## (undated) DEVICE — CATH BALLOON NC EMERGE 3.50X12MM H7493926712350

## (undated) DEVICE — CATH BALLOON EMERGE 2.5X20MM H7493918920250

## (undated) DEVICE — CATH JACKY 5FR 3.5 CURVE 40-5023

## (undated) DEVICE — CATH BALLOON NC EMERGE 2.50X12MM H7493926712250

## (undated) DEVICE — CATH BALLOON NC EMERGE 2.50X15MM H7493926715250

## (undated) DEVICE — CATH BALLOON EMERGE 1.5X20MM H7493918920150

## (undated) DEVICE — KIT HAND CONTROL ACIST 014644 AR-P54

## (undated) DEVICE — MANIFOLD KIT ANGIO AUTOMATED 014613

## (undated) DEVICE — GUIDEWIRE RUNTHROUGH IZANAI PTCA .014 X 180CM 25-5211

## (undated) DEVICE — CATH BALLOON NC EMERGE 4.50X8MM H7493926708450

## (undated) DEVICE — SLEEVE TR BAND RADIAL COMPRESSION DEVICE 24CM TRB24-REG

## (undated) DEVICE — GLIDEWIRE TERUMO .035X180CM 1.5,, J-TIP GR3525

## (undated) DEVICE — WIRE GUIDE HI-TRQ PILOT 50 JTIP 0.014"X190CM 1010480-HJ

## (undated) DEVICE — TUBING PRESSURE 30"

## (undated) DEVICE — VALVE HEMOSTATIC WATCHDOG 8FR INNER LUMEN H74939343021

## (undated) DEVICE — WIRE GUIDE 0.035"X150CM EMERALD J TIP 502521

## (undated) DEVICE — PACK HEART LEFT CUSTOM PC15OT92B

## (undated) DEVICE — SHTH INTRO 0.021IN ID 6FR DIA

## (undated) DEVICE — GW 0.014IN (0.01IN TAPER) X 19

## (undated) DEVICE — WIRE GUIDE 0.014"X180CM GRANDSLAM STR TP AG141002

## (undated) DEVICE — CATH BALLOON NC EMERGE 2.50X20MM H7493926720250

## (undated) DEVICE — CATH US OD 5FR OD SEC 2.9FR EAGLE EYE PLATINUM 0.014 85900P

## (undated) DEVICE — CATH TURNPIKE LP 135CM

## (undated) DEVICE — KIT DVC ANGIO IBASIXCOMPAK 13INX20ML 3WAY IN4430

## (undated) DEVICE — GW VASC .035IN DIA 260CML 7CML 3 MM RADIUS J CURVE 502455

## (undated) DEVICE — CATH GD VISTA BRITE BLU YLW 100CM 6FR XB3.5 6700540E

## (undated) RX ORDER — FENTANYL CITRATE 50 UG/ML
INJECTION, SOLUTION INTRAMUSCULAR; INTRAVENOUS
Status: DISPENSED
Start: 2025-05-05

## (undated) RX ORDER — SODIUM CHLORIDE 9 MG/ML
INJECTION, SOLUTION INTRAVENOUS
Status: DISPENSED
Start: 2025-05-05

## (undated) RX ORDER — LIDOCAINE HYDROCHLORIDE 10 MG/ML
INJECTION, SOLUTION EPIDURAL; INFILTRATION; INTRACAUDAL; PERINEURAL
Status: DISPENSED
Start: 2025-05-05

## (undated) RX ORDER — ASPIRIN 325 MG
TABLET ORAL
Status: DISPENSED
Start: 2025-05-05

## (undated) RX ORDER — NICARDIPINE HCL-0.9% SOD CHLOR 1 MG/10 ML
SYRINGE (ML) INTRAVENOUS
Status: DISPENSED
Start: 2025-05-05

## (undated) RX ORDER — HEPARIN SODIUM 1000 [USP'U]/ML
INJECTION, SOLUTION INTRAVENOUS; SUBCUTANEOUS
Status: DISPENSED
Start: 2025-05-05

## (undated) RX ORDER — NITROGLYCERIN 5 MG/ML
VIAL (ML) INTRAVENOUS
Status: DISPENSED
Start: 2025-05-05

## (undated) RX ORDER — HYDRALAZINE HYDROCHLORIDE 20 MG/ML
INJECTION INTRAMUSCULAR; INTRAVENOUS
Status: DISPENSED
Start: 2025-05-05

## (undated) RX ORDER — HEPARIN SODIUM 200 [USP'U]/100ML
INJECTION, SOLUTION INTRAVENOUS
Status: DISPENSED
Start: 2025-05-05